# Patient Record
Sex: FEMALE | Race: WHITE | NOT HISPANIC OR LATINO | Employment: FULL TIME | URBAN - NONMETROPOLITAN AREA
[De-identification: names, ages, dates, MRNs, and addresses within clinical notes are randomized per-mention and may not be internally consistent; named-entity substitution may affect disease eponyms.]

---

## 2017-12-15 ENCOUNTER — COMMUNICATION - GICH (OUTPATIENT)
Dept: FAMILY MEDICINE | Facility: OTHER | Age: 52
End: 2017-12-15

## 2017-12-15 DIAGNOSIS — G43.009 MIGRAINE WITHOUT AURA AND WITHOUT STATUS MIGRAINOSUS, NOT INTRACTABLE: ICD-10-CM

## 2018-01-24 ENCOUNTER — HISTORY (OUTPATIENT)
Dept: FAMILY MEDICINE | Facility: OTHER | Age: 53
End: 2018-01-24

## 2018-01-24 ENCOUNTER — OFFICE VISIT - GICH (OUTPATIENT)
Dept: FAMILY MEDICINE | Facility: OTHER | Age: 53
End: 2018-01-24

## 2018-01-24 DIAGNOSIS — Z00.00 ENCOUNTER FOR GENERAL ADULT MEDICAL EXAMINATION WITHOUT ABNORMAL FINDINGS: ICD-10-CM

## 2018-01-24 DIAGNOSIS — L91.8 OTHER HYPERTROPHIC DISORDERS OF THE SKIN: ICD-10-CM

## 2018-01-24 DIAGNOSIS — K21.00 GASTRO-ESOPHAGEAL REFLUX DISEASE WITH ESOPHAGITIS: ICD-10-CM

## 2018-01-24 DIAGNOSIS — R59.0 LOCALIZED ENLARGED LYMPH NODES: ICD-10-CM

## 2018-01-24 DIAGNOSIS — E78.9 DISORDER OF LIPOPROTEIN METABOLISM: ICD-10-CM

## 2018-01-24 DIAGNOSIS — L01.00 IMPETIGO: ICD-10-CM

## 2018-01-24 DIAGNOSIS — E04.9 NONTOXIC GOITER: ICD-10-CM

## 2018-01-24 DIAGNOSIS — Z23 ENCOUNTER FOR IMMUNIZATION: ICD-10-CM

## 2018-01-24 DIAGNOSIS — N84.1 POLYP OF CERVIX UTERI: ICD-10-CM

## 2018-01-24 LAB
A/G RATIO - HISTORICAL: 2 (ref 1–2)
ABSOLUTE BASOPHILS - HISTORICAL: 0 THOU/CU MM
ABSOLUTE EOSINOPHILS - HISTORICAL: 0.1 THOU/CU MM
ABSOLUTE IMMATURE GRANULOCYTES(METAS,MYELOS,PROS) - HISTORICAL: 0 THOU/CU MM
ABSOLUTE LYMPHOCYTES - HISTORICAL: 2.1 THOU/CU MM (ref 0.9–2.9)
ABSOLUTE MONOCYTES - HISTORICAL: 0.5 THOU/CU MM
ABSOLUTE NEUTROPHILS - HISTORICAL: 4.7 THOU/CU MM (ref 1.7–7)
ALBUMIN SERPL-MCNC: 4.5 G/DL (ref 3.5–5.7)
ALP SERPL-CCNC: 79 IU/L (ref 34–104)
ALT (SGPT) - HISTORICAL: 35 IU/L (ref 7–52)
ANION GAP - HISTORICAL: 9 (ref 5–18)
AST SERPL-CCNC: 24 IU/L (ref 13–39)
BASOPHILS # BLD AUTO: 0.3 %
BILIRUB SERPL-MCNC: 0.6 MG/DL (ref 0.3–1)
BUN SERPL-MCNC: 13 MG/DL (ref 7–25)
BUN/CREAT RATIO - HISTORICAL: 17
CALCIUM SERPL-MCNC: 9.2 MG/DL (ref 8.6–10.3)
CHLORIDE SERPLBLD-SCNC: 105 MMOL/L (ref 98–107)
CHOL/HDL RATIO - HISTORICAL: 3.38
CHOLESTEROL TOTAL: 186 MG/DL
CO2 SERPL-SCNC: 25 MMOL/L (ref 21–31)
CREAT SERPL-MCNC: 0.76 MG/DL (ref 0.7–1.3)
EOSINOPHIL NFR BLD AUTO: 0.7 %
ERYTHROCYTE [DISTWIDTH] IN BLOOD BY AUTOMATED COUNT: 14.6 % (ref 11.5–15.5)
GFR IF NOT AFRICAN AMERICAN - HISTORICAL: >60 ML/MIN/1.73M2
GLOBULIN - HISTORICAL: 2.3 G/DL (ref 2–3.7)
GLUCOSE SERPL-MCNC: 93 MG/DL (ref 70–105)
HCT VFR BLD AUTO: 37.8 % (ref 33–51)
HDLC SERPL-MCNC: 55 MG/DL (ref 23–92)
HEMOGLOBIN: 11.9 G/DL (ref 12–16)
IMMATURE GRANULOCYTES(METAS,MYELOS,PROS) - HISTORICAL: 0.1 %
LDLC SERPL CALC-MCNC: 116 MG/DL
LYMPHOCYTES NFR BLD AUTO: 28.4 % (ref 20–44)
MCH RBC QN AUTO: 26.1 PG (ref 26–34)
MCHC RBC AUTO-ENTMCNC: 31.5 G/DL (ref 32–36)
MCV RBC AUTO: 83 FL (ref 80–100)
MONOCYTES NFR BLD AUTO: 7.1 %
NEUTROPHILS NFR BLD AUTO: 63.4 % (ref 42–72)
NON-HDL CHOLESTEROL - HISTORICAL: 131 MG/DL
PLATELET # BLD AUTO: 227 THOU/CU MM (ref 140–440)
PMV BLD: 11 FL (ref 6.5–11)
POTASSIUM SERPL-SCNC: 3.9 MMOL/L (ref 3.5–5.1)
PROT SERPL-MCNC: 6.8 G/DL (ref 6.4–8.9)
PROVIDER ORDERDED STATUS - HISTORICAL: ABNORMAL
RED BLOOD COUNT - HISTORICAL: 4.56 MIL/CU MM (ref 4–5.2)
SODIUM SERPL-SCNC: 139 MMOL/L (ref 133–143)
TRIGL SERPL-MCNC: 75 MG/DL
TSH - HISTORICAL: 2.76 UIU/ML (ref 0.34–5.6)
VIT B12 SERPL-MCNC: 444 PG/ML (ref 180–914)
WHITE BLOOD COUNT - HISTORICAL: 7.4 THOU/CU MM (ref 4.5–11)

## 2018-02-01 ENCOUNTER — HISTORY (OUTPATIENT)
Dept: RADIOLOGY | Facility: OTHER | Age: 53
End: 2018-02-01

## 2018-02-01 ENCOUNTER — HOSPITAL ENCOUNTER (OUTPATIENT)
Dept: RADIOLOGY | Facility: OTHER | Age: 53
End: 2018-02-01
Attending: FAMILY MEDICINE

## 2018-02-01 ENCOUNTER — AMBULATORY - GICH (OUTPATIENT)
Dept: FAMILY MEDICINE | Facility: OTHER | Age: 53
End: 2018-02-01

## 2018-02-01 DIAGNOSIS — N84.1 POLYP OF CERVIX UTERI: ICD-10-CM

## 2018-02-01 DIAGNOSIS — Z00.00 ENCOUNTER FOR GENERAL ADULT MEDICAL EXAMINATION WITHOUT ABNORMAL FINDINGS: ICD-10-CM

## 2018-02-01 LAB — HPV RESULTS - HISTORICAL: NEGATIVE

## 2018-02-05 ENCOUNTER — DOCUMENTATION ONLY (OUTPATIENT)
Dept: FAMILY MEDICINE | Facility: OTHER | Age: 53
End: 2018-02-05

## 2018-02-05 ENCOUNTER — AMBULATORY - GICH (OUTPATIENT)
Dept: RADIOLOGY | Facility: OTHER | Age: 53
End: 2018-02-05

## 2018-02-05 DIAGNOSIS — R92.8 OTHER ABNORMAL AND INCONCLUSIVE FINDINGS ON DIAGNOSTIC IMAGING OF BREAST: ICD-10-CM

## 2018-02-05 PROBLEM — B97.7 HUMAN PAPILLOMAVIRUS IN CONDITIONS CLASSIFIED ELSEWHERE AND OF UNSPECIFIED SITE: Status: ACTIVE | Noted: 2018-02-05

## 2018-02-05 RX ORDER — CYCLOBENZAPRINE HCL 10 MG
1 TABLET ORAL
COMMUNITY
Start: 2017-12-18 | End: 2018-12-05

## 2018-02-05 RX ORDER — HYDROCHLOROTHIAZIDE 12.5 MG/1
0.5 TABLET ORAL DAILY PRN
COMMUNITY
Start: 2016-10-21 | End: 2018-02-20

## 2018-02-06 ENCOUNTER — COMMUNICATION - GICH (OUTPATIENT)
Dept: FAMILY MEDICINE | Facility: OTHER | Age: 53
End: 2018-02-06

## 2018-02-07 ENCOUNTER — COMMUNICATION - GICH (OUTPATIENT)
Dept: FAMILY MEDICINE | Facility: OTHER | Age: 53
End: 2018-02-07

## 2018-02-07 ENCOUNTER — HOSPITAL ENCOUNTER (OUTPATIENT)
Dept: RADIOLOGY | Facility: OTHER | Age: 53
End: 2018-02-07
Attending: FAMILY MEDICINE

## 2018-02-07 DIAGNOSIS — N94.9 UNSPECIFIED CONDITION ASSOCIATED WITH FEMALE GENITAL ORGANS AND MENSTRUAL CYCLE (CODE): ICD-10-CM

## 2018-02-07 DIAGNOSIS — R92.8 OTHER ABNORMAL AND INCONCLUSIVE FINDINGS ON DIAGNOSTIC IMAGING OF BREAST: ICD-10-CM

## 2018-02-08 ENCOUNTER — COMMUNICATION - GICH (OUTPATIENT)
Dept: FAMILY MEDICINE | Facility: OTHER | Age: 53
End: 2018-02-08

## 2018-02-08 DIAGNOSIS — L01.00 IMPETIGO: ICD-10-CM

## 2018-02-09 VITALS
HEIGHT: 67 IN | WEIGHT: 157.8 LBS | HEART RATE: 62 BPM | SYSTOLIC BLOOD PRESSURE: 130 MMHG | DIASTOLIC BLOOD PRESSURE: 82 MMHG | BODY MASS INDEX: 24.77 KG/M2

## 2018-02-11 ENCOUNTER — HEALTH MAINTENANCE LETTER (OUTPATIENT)
Age: 53
End: 2018-02-11

## 2018-02-12 NOTE — TELEPHONE ENCOUNTER
Patient Information     Patient Name MRN Sex Rakel Walker [id number] Female 1965      Telephone Encounter by Cassandra Oden RN at 2017 10:38 AM     Author:  Cassandra Oden RN Service:  (none) Author Type:  NURS- Registered Nurse     Filed:  2017 10:45 AM Encounter Date:  12/15/2017 Status:  Signed     :  Cassandra Oden RN (NURS- Registered Nurse)            PLEASE REVIEW, SIGN AND SEND AS APPROPRIATE: THANK YOU.    Patient was due for annual physical on 10/21/2017. No upcoming appointments noted. Patient plans to make schedule appointment today for some time in January. Patient will need to call back as she is in the middle of something pressing at work.     PATIENT WOULD LIKE A CALL ONCE THIS MEDICATION HAS BEEN APPROVED. Patient is aware that PCP is out of clinic until Tue. 17.    This is a Refill request from: [facility]  Name of Medication: cyclobenzaprine (FLEXERIL)  Quantity requested: 31 tabs  Last fill date: 2016  Due for refill: Yes  Last visit with RACHEL CARDOZO was on: 10/21/2016 in [facility]  PCP:  Rachel Cardozo MD  Controlled Substance Agreement: None for this medication.  Diagnosis r/t this medication request: Non-intractable migraine    Unable to complete prescription refill per RN Medication Refill Policy.................... Cassandra Oden RN ....................  2017   10:39 AM

## 2018-02-12 NOTE — TELEPHONE ENCOUNTER
Patient Information     Patient Name MRN Sex Rakel Walker [id number] Female 1965      Telephone Encounter by Keyla Walters RN at 2017 10:29 AM     Author:  Keyla Walters RN Service:  (none) Author Type:  NURS- Registered Nurse     Filed:  2017 10:44 AM Encounter Date:  12/15/2017 Status:  Signed     :  Keyla Walters RN (NURS- Registered Nurse)            ,  Pt is requesting a refill of Cyclobenzaprine last filled 2016.  Pt was last seen 2016, medication was reviewed.  Appointment reminder letter will be sent today.  Medication is dasha'd up per request, please review and sign if appropriate.    This is a Refill request from: [facility]  Name of Medication: Cyclobenzaprine  Quantity requested: 31  Last fill date: 2016  Due for refill: yes  Last visit with ROSA ISELA CARDOZO was on: 10/21/2016 in [facility]  PCP:  Rosa Isela Cardozo MD  Controlled Substance Agreement:  NA   Diagnosis r/t this medication request: Migraine     Unable to complete prescription refill per RN Medication Refill Policy.................... Keyla Walters RN ....................  2017   10:30 AM

## 2018-02-13 NOTE — PROGRESS NOTES
Patient Information     Patient Name MRN Sex     Rakel Mckay [id number] Female 1965      Progress Notes by Rosa Isela Cardozo MD at 2018 10:30 AM     Author:  Rosa Isela Cardozo MD Service:  (none) Author Type:  Physician     Filed:  2018  5:36 PM Encounter Date:  2018 Status:  Signed     :  Rosa Isela Cardozo MD (Physician)            Nursing Notes:   Urmila Harris  2018 12:51 PM  Signed  Rakel Mckay is a 52 y.o. Female here for annual visit.  No concerns.  Darlyn Harris LPN ...... 2018 10:39 AM      Subjective:  Rakel Mckay is a 52 y.o. female who presents for  Routine physical         GYN   patient is a  001 LMP is 2018. She is in a monogamous relationship. Remote history of HPV. Would like to have Pap smear today. She has no history of vaginal discomfort or discharge. Declines STD screening. States occasionally she has small amount of spotting.  Periods are starting to  become more irregular.  She has had a history of significant fibrocystic breast disease she's had numerous cysts drained.        Reflux esophagitis   patient notes that she's had increased indigestion symptoms. At times she's had trouble swallowing and feels a fullness on the side of her neck. Denies any fevers or chills. Weight is stable. No significant  night sweats     Skin tag  Patient has skin tags under her breasts that are growing and would like to have these treated.       . Borderline high cholesterol   patient has had a history of borderline cholesterol with like to be checked for this she is fasting today. She denies any chest pain or palpitations.     HCM   needs flu vaccine             Impetigo     Patient reports rash around perioral area last few days      No Known Allergies  Current Outpatient Prescriptions on File Prior to Visit       Medication  Sig Dispense Refill     cyclobenzaprine (FLEXERIL) 10 mg tablet TAKE 1 TABLET BY MOUTH ONCE DAILY  "AT BEDTIME AS NEEDED FOR MUSCLE SPASMS. 31 tablet 1     No current facility-administered medications on file prior to visit.        Problem List/PMH: reviewed in EMR    Social Hx:  Social History     Substance Use Topics       Smoking status: Never Smoker     Smokeless tobacco: Never Used     Alcohol use No     Social History Narrative    , employed at [employer].      Drew Spouse, employed at [employer]    Daughter, born 02/01        Preloaded 12/26/13        Family Hx:   Family History       Problem   Relation Age of Onset     Heart Disease  Mother      MI at age 40 with subsequent CHF, lung cancer, and chronic obstructive pulmonary disease, doing well, TIAs       Cancer  Mother       lung cancer       Other  Mother      COPD        Stroke  Mother      TIAs       Other  Father      hypothyroid       Cancer-prostate  Paternal Grandfather      Cancer-breast  No Family History        Objective:  /82 (Cuff Site: Right Arm, Position: Sitting, Cuff Size: Adult Large)  Pulse 62  Ht 1.689 m (5' 6.5\")  Wt 71.6 kg (157 lb 12.8 oz)  LMP 01/05/2018  BMI 25.09 kg/m2    General Appearance: Normal., Pleasant, alert, appropriate appearance for age. No acute distress,  Psych-alert, oriented, and appropriate affect  HEENT-within normal limits   Neck Exam: Normal., Supple, no masses or nodes.  Thyroid Exam: No nodules or enlargement., normal to palpation  Lungs:  Clear to auscultation.  Cardiovascular Exam: Regular rate and rhythm. S1, S2, no murmur, click, gallop, or rubs.  Breast Exam: Normal., No dimpling, nipple retraction or discharge. Fibrocystic changes but no masses or nodes.  Gastrointestinal Exam: Soft, nontender, no abnormal masses or organomegaly.    Genitourinary Exam Female:   External Genitalia: normal hair distribution, EG/BUS  Vaginal exam: normal pink mucosa without prolapse or lesions  Cervix: normal appearance without lesions or other abnormalities  She has small cervical polyp 5- 6 o'clock " position;  Removed with ring forceps after pap was obtained.     BME: normal size uterus, no adnexal masses.  Skin: no concerning moles, she has 4 skin tags approximately half centimeter or 3 mm. They're in the bra line. These were treated with cryotherapy after spent 15 alternatives were discussed. She tolerated this well. Additionally she has a impetigo type rash in her perioral area  Extremities:  NT, no edema      Results for orders placed or performed in visit on 01/24/18      COMP METABOLIC PANEL      Result  Value Ref Range    SODIUM 139 133 - 143 mmol/L    POTASSIUM 3.9 3.5 - 5.1 mmol/L    CHLORIDE 105 98 - 107 mmol/L    CO2,TOTAL 25 21 - 31 mmol/L    ANION GAP 9 5 - 18                    GLUCOSE 93 70 - 105 mg/dL    CALCIUM 9.2 8.6 - 10.3 mg/dL    BUN 13 7 - 25 mg/dL    CREATININE 0.76 0.70 - 1.30 mg/dL    BUN/CREAT RATIO           17                    GFR if African American >60 >60 ml/min/1.73m2    GFR if not African American >60 >60 ml/min/1.73m2    ALBUMIN 4.5 3.5 - 5.7 g/dL    PROTEIN,TOTAL 6.8 6.4 - 8.9 g/dL    GLOBULIN                  2.3 2.0 - 3.7 g/dL    A/G RATIO 2.0 1.0 - 2.0                    BILIRUBIN,TOTAL 0.6 0.3 - 1.0 mg/dL    ALK PHOSPHATASE 79 34 - 104 IU/L    ALT (SGPT) 35 7 - 52 IU/L    AST (SGOT) 24 13 - 39 IU/L   TSH      Result  Value Ref Range    TSH 2.76 0.34 - 5.60 uIU/mL   LIPID PANEL      Result  Value Ref Range    CHOLESTEROL,TOTAL 186 <200 mg/dL    TRIGLYCERIDES 75 <150 mg/dL    HDL CHOLESTEROL 55 23 - 92 mg/dL    NON-HDL CHOLESTEROL 131 <145 mg/dl    CHOL/HDL RATIO            3.38 <4.50                    LDL CHOLESTEROL 116 (H) <100 mg/dL    PROVIDER ORDERED STATUS FASTING    VITAMIN B12      Result  Value Ref Range    VITAMIN B12 444 180 - 914 pg/mL   CBC WITH AUTO DIFFERENTIAL      Result  Value Ref Range    WHITE BLOOD COUNT         7.4 4.5 - 11.0 thou/cu mm    RED BLOOD COUNT           4.56 4.00 - 5.20 mil/cu mm    HEMOGLOBIN                11.9 (L) 12.0 - 16.0 g/dL     HEMATOCRIT                37.8 33.0 - 51.0 %    MCV                       83 80 - 100 fL    MCH                       26.1 26.0 - 34.0 pg    MCHC                      31.5 (L) 32.0 - 36.0 g/dL    RDW                       14.6 11.5 - 15.5 %    PLATELET COUNT            227 140 - 440 thou/cu mm    MPV                       11.0 6.5 - 11.0 fL    NEUTROPHILS               63.4 42.0 - 72.0 %    LYMPHOCYTES               28.4 20.0 - 44.0 %    MONOCYTES                 7.1 <12.0 %    EOSINOPHILS               0.7 <8.0 %    BASOPHILS                 0.3 <3.0 %    IMMATURE GRANULOCYTES(METAS,MYELOS,PROS) 0.1 %    ABSOLUTE NEUTROPHILS      4.7 1.7 - 7.0 thou/cu mm    ABSOLUTE LYMPHOCYTES      2.1 0.9 - 2.9 thou/cu mm    ABSOLUTE MONOCYTES        0.5 <0.9 thou/cu mm    ABSOLUTE EOSINOPHILS      0.1 <0.5 thou/cu mm    ABSOLUTE BASOPHILS        0.0 <0.3 thou/cu mm    ABSOLUTE IMMATURE GRANULOCYTES(METAS,MYELOS,PROS) 0.0 <=0.3 thou/cu mm         Assessment:    ICD-10-CM    1. Routine physical examination Z00.00 GYN THIN PREP PAP SCREEN IMAGED      XR MAMMO BILAT SCREENING      GYN THIN PREP PAP SCREEN IMAGED   2. Cervical adenopathy R59.0 CBC WITH DIFFERENTIAL      CBC WITH DIFFERENTIAL      CBC WITH AUTO DIFFERENTIAL   3. Reflux esophagitis K21.0 COMP METABOLIC PANEL      COMP METABOLIC PANEL   4. Enlarged thyroid E04.9 TSH      TSH   5. Borderline high cholesterol E78.9 LIPID PANEL      VITAMIN B12      LIPID PANEL      VITAMIN B12   6. Encounter for immunization Z23 FLU VACCINE => 3 YRS PF QUADRIVALENT IIV4 IM      SD ADMIN VACC INITIAL   7. Cervical polyp N84.1 US PELVIS COMPLETE TA AND TV      pathology specimen      pathology specimen   8. Skin tag L91.8 SD DESTROY PREMALIGNANT LESION EA 2-14   9. Impetigo L01.00 mupirocin 2% topical (BACTROBAN OINTMENT) ointment     discussion with patient pathology results for cervical polyp will take a few days this will be sent to her in the mail. Flu vaccine updated. Did discuss need  for ultrasound given cervical polyp  Will treat impetigo as noted per Epic. If she has persistent problems with neck fullness consideration for ultrasound.    Work on following  a mediterranean diet; Use monosaturated fats ( olive , canola and peanut   oil; nuts, avocados), abundance of plant foods which are minimally processed,   fish (salmon).       Plan:   -- Expected clinical course discussed   -- Medications and their side effects discussed  Patient Instructions   1. Pelvic ultrasound to evaluate endometrial lining given cervical polyp  2. For all to gynecologist to review ultrasound possible need for endometrial biopsy   3. Mammogram ordered   4. Skin tag areas that were frozen well bubble up and slough off   5.   Bactroban twice a day to 3 times a day for impetigo   6.  Eat nutritionally;  Add vitamin  c           Index   Cervical Polyps   ________________________________________________________________________  KEY POINTS    Cervical polyps are a growth of tissue on the cervix, the lower part of the uterus that opens into the vagina.    Your healthcare provider will painlessly remove most polyps in the office and send the tissue to a lab for tests to make sure it is not cancer.    Get a pelvic exam as often as recommended by your healthcare provider.  ________________________________________________________________________  What are cervical polyps?  Cervical polyps are a growth of tissue on the cervix. The cervix is the lower part of the uterus that opens into the vagina. The uterus is the muscular organ at the top of the vagina. Babies grow in the uterus, and menstrual blood comes from the uterus, through the cervix.   Women of any age may have cervical polyps.  What is the cause?  The exact cause of cervical polyps is not known. They are not related to sexually transmitted diseases and infections (STDs and STIs) and are very rarely related to cancer.  What are the symptoms?  Cervical polyps often do not  cause any symptoms. You usually cannot feel or see them. Sometimes they may cause bleeding between menstrual periods, especially after sex.  How are they diagnosed?  Your healthcare provider will ask about your symptoms and medical history and examine you. You will have a pelvic exam and possibly a Pap test. Most cervical polyps are discovered during a pelvic exam or with exams for causes of unusual bleeding.  How is it treated?  Polyps seldom develop into cancer of the cervix. However, the only way your healthcare provider can be sure that a polyp is not cancer is by removing it and looking at the tissue in the lab.  Most polyps are removed. Your healthcare provider usually will be able to remove the polyps painlessly. Your provider may scrape the base of the polyp to make sure the entire polyp is removed. A type of paste may be put on your cervix if there is any bleeding. The tissue that was removed will be sent to a lab for tests to make sure it is not cancer. This procedure can usually be done in your provider's office.  If the polyp is not removed:    It may keep growing.    It may cause bleeding between menstrual periods or after sex.    It may be harder for you to get pregnant.  How can I take care of myself?  After treatment for cervical polyps, get a pelvic exam as often as recommended by your healthcare provider. Tell your healthcare provider if you have bleeding after sex, between menstrual periods, or after menopause.  Developed by [employer].  Adult Advisor 2017.2 published by [employer].  Last modified: 2015-10-20  Last reviewed: 2015-10-20  This content is reviewed periodically and is subject to change as new health information becomes available. The information is intended to inform and educate and is not a replacement for medical evaluation, advice, diagnosis or treatment by a healthcare professional.  References   Adult Advisor 2017.2 Index    Copyright   2017 [employer], a division of [employer] All rights reserved.          Index   Impetigo   ________________________________________________________________________  KEY POINTS    Impetigo is a skin infection caused by Staphylococcus or Streptococcus bacteria. The infection begins as one or more blisters. The blisters form pus inside and then break open.    If the infection is mild, all you may need to do is keep your skin clean so the infection can heal on its own. Your healthcare provider may prescribe an antibiotic for larger or more serious infections.    Ask your provider how to take care of yourself at home, if there are activities you should avoid, and when you can return to your normal activities.  ________________________________________________________________________  What is impetigo?  Impetigo is a skin infection caused by bacteria. It is more common in children than in adults. Impetigo is usually a mild infection but it can spread and cause serious illness if it is not treated.  What is the cause?  Impetigo is caused by Staphylococcus or Streptococcus bacteria. These bacteria usually live on your skin without hurting you. However, if the bacteria get into the deeper layers of the skin, they may cause an infection. This can happen if you have a skin condition like eczema, or if you have a scratch, scrape, insect bite, or other irritation that causes a break in the skin. Impetigo is more common when it is hot and humid.   Scratching the infected area and then touching other parts of your body can spread the infection. Impetigo is very contagious. Physical contact can spread the infection to other people. It can also be spread by contaminated clothing, sports equipment, towels, bedding, and toys.  What are the symptoms?  Impetigo can infect any part of the body, but often appears on the face between the upper lip and nose. The infection begins as one or more blisters. The blisters form pus inside and then break open. The pus from  the blisters usually dries as a gold or yellow-colored crust. The blisters or sores are painless, but may itch.  How is it diagnosed?  Your healthcare provider will ask about your symptoms and medical history and examine your skin. Your provider may use a swab to get a sample of fluid from one of the sores. The sample is sent to the lab and tested. If you have an infection, it may take several days to find out what kind of germ is causing it. Knowing what germ is causing the infection helps your provider choose the right medicine to treat it.  How is it treated?  The treatment depends on your age and the severity and type of infection that you have. If the infection is mild, all you may need to do is keep your skin clean so the infection can heal on its own. Your healthcare provider may recommend a nonprescription antibiotic ointment or prescribe one for you to put on your skin.  Some people carry the bacteria inside their nose and the infection may come back if the nose is not treated. Your provider will tell you if you need to put some of the antibiotic ointment inside your nose and for how long.  For larger or more serious infections, your provider may prescribe an antibiotic to take by mouth or give you a shot of antibiotic medicine.  The sores should start to heal within 2 to 5 days after you start using an antibiotic. If you are taking antibiotic medicine, the infection usually stops being contagious after 24 hours of treatment. If you are using an antibiotic ointment instead, the sores will no longer be contagious when they stop oozing and are drying up.  How can I take care of myself?  Follow the full course of treatment prescribed by your healthcare provider. If your provider prescribed an antibiotic to take by mouth, take the medicine for as long as your healthcare provider prescribes, even if you feel better. If you stop taking the medicine too soon, you may not kill all of the bacteria and the infection  may come back.  In addition, you can:    Gently wash the infected area with antibacterial soap.    To remove the crusts, first soak the area for 15 to 20 minutes in warm soapy water. Then gently remove the crusts with a warm washcloth.    Don t share hand towels with others while you have this infection.    Wash any washcloths or towels you use in hot water separately from other laundry.    Cover sores on the face with a bandage if necessary to prevent scratching, especially when sleeping. Cover any sores that are draining with a bandage to protect clothing and prevent spread. Wash your hands after touching the area.    Shave around sores, not over them.    Avoid touching the sores as much as possible.  Ask your provider:    How and when you will get your test results    How long it will take to recover    If there are activities you should avoid and when you can return to your normal activities    How to take care of yourself at home    What symptoms or problems you should watch for and what to do if you have them  Make sure you know when you should come back for a checkup.  How can I help prevent impetigo?    Wash cuts and scratches, insect bites, or other breaks in the skin with warm water and soap right away to prevent infection. Cover the area with a bandage.    Wash your hands often and especially after using the restroom, coughing, sneezing, or blowing your nose. Also wash your hands before eating or touching your eyes.    Do not share washcloths, towels, clothing, sleeping bags, bedding, bath water, or personal items like razors or ash.    Avoid sharing sports equipment, such as football pads, as much as possible.    Use hot, soapy water to wash clothes and bedding. Dry clothes on the hot setting if you use a dryer.    Shower or bathe using soap every day and after you exercise.  In sports such as wrestling, where there may be close contact, athletes who have impetigo should not compete while they have the  infection. It s also important to keep mats and equipment clean.  Developed by [employer].  Adult Advisor 2017.2 published by [employer].  Last modified: 2016-07-28  Last reviewed: 2016-07-28  This content is reviewed periodically and is subject to change as new health information becomes available. The information is intended to inform and educate and is not a replacement for medical evaluation, advice, diagnosis or treatment by a healthcare professional.  References   Adult Advisor 2017.2 Index    Copyright   2017 [employer], a division of [employer] All rights reserved.           Electronically signed by Rosa Isela Cardozo MD

## 2018-02-13 NOTE — TELEPHONE ENCOUNTER
Patient Information     Patient Name MRN Sex     Rakel Mckay [id number] Female 1965      Telephone Encounter by Rosa Isela Cardozo MD at 2018  1:42 PM     Author:  Rosa Isela Cardozo MD Service:  (none) Author Type:  Physician     Filed:  2018  1:44 PM Encounter Date:  2018 Status:  Signed     :  Rosa Isela Cardozo MD (Physician)            Details             Reading Physician Reading Date Result Priority         Ihsan Diaz MD 2018                    Narrative               PROCEDURE: US PELVIS COMPLETE TV    HISTORY: Cervical polyp. LMP 2018    TECHNIQUE: Transabdominal and transvaginal ultrasound of the pelvis.    COMPARISON: 2013    FINDINGS:     MEASUREMENTS:  Uterus: 7.6 x 5.7 x 8.4 (Length x Height x Width)  Endometrium: 8 mm in thickness    UTERUS: The uterus is normal in size and contour. The endometrium is normal in thickness, with mildly heterogeneous margins. There is no myometrial mass. Nabothian cysts are noted.    ADNEXA: Neither ovary is definitively identified. A small cyst is questioned in the deep left adnexa measuring less than 2 cm, potentially within the left ovary and similar in appearance to the prior from .    MISC: There is no free fluid in the pelvis.    IMPRESSION: Nabothian cysts.    Electronically Signed By: Chevy Diaz on 2018 3:21 PM          She needs to follow up with GYN to have them review a cyst in the left adnexal area;  DR. BIRMINGHAM ;   Referral placed.

## 2018-02-13 NOTE — PROGRESS NOTES
Patient Information     Patient Name MRN Sex Rakel Walker [id number] Female 1965      Progress Notes by Alissa Pierce at 2018  2:40 PM     Author:  Alissa Pierce Service:  (none) Author Type:  (none)     Filed:  2018  2:40 PM Date of Service:  2018  2:40 PM Status:  Signed     :  Alissa Pierce            Falls Risk Criteria:    Age 65 and older or under age 4        Sensory deficits    Poor vision    Use of ambulatory aides    Impaired judgment    Unable to walk independently    Meets High Risk criteria for falls:  no

## 2018-02-13 NOTE — TELEPHONE ENCOUNTER
Patient Information     Patient Name MRN Sex Rakel Walker [id number] Female 1965      Telephone Encounter by Rosa Isela Cardozo MD at 2018  8:18 PM     Author:  Rosa Isela Cardozo MD Service:  (none) Author Type:  Physician     Filed:  2018  8:19 PM Encounter Date:  2018 Status:  Signed     :  Rosa Isela Cardozo MD (Physician)            Peter called    MD attempt to call patient Thursday evening no answer.

## 2018-02-13 NOTE — NURSING NOTE
Patient Information     Patient Name MRN Sex Rakel Walker [id number] Female 1965      Nursing Note by Urmila Harris at 2018 10:30 AM     Author:  Urmila Harris Service:  (none) Author Type:  (none)     Filed:  2018 12:51 PM Encounter Date:  2018 Status:  Signed     :  Urmila Harris            Rakel Mckay is a 52 y.o. Female here for annual visit.  No concerns.  Darlyn Harris LPN ...... 2018 10:39 AM

## 2018-02-13 NOTE — TELEPHONE ENCOUNTER
Patient Information     Patient Name MRN Rakel Jimenez [id number] Female 1965      Telephone Encounter by Jayla Delaney at 2018  8:40 AM     Author:  Jayla Delaney Service:  (none) Author Type:  (none)     Filed:  2018  8:40 AM Encounter Date:  2018 Status:  Signed     :  Jayla Delaney            Contacted patient and she was notified that Rx has been sent to her pharmacy.  Jayla Delaney LPN  2018  8:40 AM

## 2018-02-13 NOTE — TELEPHONE ENCOUNTER
Patient Information     Patient Name MRN Sex Rakel Walker [id number] Female 1965      Telephone Encounter by Urmila Harris at 2018 10:10 AM     Author:  Urmila Harris Service:  (none) Author Type:  (none)     Filed:  2018 10:14 AM Encounter Date:  2018 Status:  Signed     :  Urmila Harris            Patient continues to have symptoms of impetigo that haven't resolved after 2 weeks, states is a lot better, but continues to have swelling, tingling on lips.  Was told by a pharmacist to call and f/u as the Bactroban hasn't cleared this fully and may need something else. Is their something else she could try? OK to wait until return tomorrow.  Darlyn Harris LPN ...... 2018 10:13 AM

## 2018-02-13 NOTE — ADDENDUM NOTE
Patient Information     Patient Name MRN Rakel Jimenez [id number] Female 1965      Addendum Note by Stacia Espana at 2018 12:11 PM     Author:  Stacia Espana Service:  (none) Author Type:  (none)     Filed:  2018 12:11 PM Encounter Date:  2018 Status:  Signed     :  Stacia Espana       Addended by: STACIA ESPANA on: 2018 12:11 PM        Modules accepted: Orders

## 2018-02-13 NOTE — PROGRESS NOTES
Patient Information     Patient Name MRN Sex Rakel Walker [id number] Female 1965      Progress Notes by Sharee Silvestre R.T. ([facility]) at 2018  2:17 PM     Author:  Sharee Silvestre R.T. (ARRT) Service:  (none) Author Type:  [employer] - Registered Radiologic Technologist     Filed:  2018  2:18 PM Date of Service:  2018  2:17 PM Status:  Signed     :  Sharee Silvestre R.T. (ARRT) ([facility] - Registered Radiologic Technologist)            Falls Risk Criteria:    Age 65 and older or under age 4        Sensory deficits    Poor vision    Use of ambulatory aides    Impaired judgment    Unable to walk independently    Meets High Risk criteria for falls:  no

## 2018-02-13 NOTE — PATIENT INSTRUCTIONS
Patient Information     Patient Name MRN Rakel Jimenez [id number] Female 1965      Patient Instructions by Rosa Isela Cardozo MD at 2018 12:09 PM     Author:  Rosa Isela Cardozo MD  Service:  (none) Author Type:  Physician     Filed:  2018  5:33 PM  Encounter Date:  2018 Status:  Addendum     :  Rosa Isela Cardozo MD (Physician)        Related Notes: Original Note by Rosa Isela Cardozo MD (Physician) filed at 2018 12:09 PM            1. Pelvic ultrasound to evaluate endometrial lining given cervical polyp  2. For all to gynecologist to review ultrasound possible need for endometrial biopsy   3. Mammogram ordered   4. Skin tag areas that were frozen well bubble up and slough off   5.   Bactroban twice a day to 3 times a day for impetigo   6.  Eat nutritionally;  Add vitamin  c           Index   Cervical Polyps   ________________________________________________________________________  KEY POINTS    Cervical polyps are a growth of tissue on the cervix, the lower part of the uterus that opens into the vagina.    Your healthcare provider will painlessly remove most polyps in the office and send the tissue to a lab for tests to make sure it is not cancer.    Get a pelvic exam as often as recommended by your healthcare provider.  ________________________________________________________________________  What are cervical polyps?  Cervical polyps are a growth of tissue on the cervix. The cervix is the lower part of the uterus that opens into the vagina. The uterus is the muscular organ at the top of the vagina. Babies grow in the uterus, and menstrual blood comes from the uterus, through the cervix.   Women of any age may have cervical polyps.  What is the cause?  The exact cause of cervical polyps is not known. They are not related to sexually transmitted diseases and infections (STDs and STIs) and are very rarely related to cancer.  What are the  symptoms?  Cervical polyps often do not cause any symptoms. You usually cannot feel or see them. Sometimes they may cause bleeding between menstrual periods, especially after sex.  How are they diagnosed?  Your healthcare provider will ask about your symptoms and medical history and examine you. You will have a pelvic exam and possibly a Pap test. Most cervical polyps are discovered during a pelvic exam or with exams for causes of unusual bleeding.  How is it treated?  Polyps seldom develop into cancer of the cervix. However, the only way your healthcare provider can be sure that a polyp is not cancer is by removing it and looking at the tissue in the lab.  Most polyps are removed. Your healthcare provider usually will be able to remove the polyps painlessly. Your provider may scrape the base of the polyp to make sure the entire polyp is removed. A type of paste may be put on your cervix if there is any bleeding. The tissue that was removed will be sent to a lab for tests to make sure it is not cancer. This procedure can usually be done in your provider's office.  If the polyp is not removed:    It may keep growing.    It may cause bleeding between menstrual periods or after sex.    It may be harder for you to get pregnant.  How can I take care of myself?  After treatment for cervical polyps, get a pelvic exam as often as recommended by your healthcare provider. Tell your healthcare provider if you have bleeding after sex, between menstrual periods, or after menopause.  Developed by [employer].  Adult Advisor 2017.2 published by [employer].  Last modified: 2015-10-20  Last reviewed: 2015-10-20  This content is reviewed periodically and is subject to change as new health information becomes available. The information is intended to inform and educate and is not a replacement for medical evaluation, advice, diagnosis or treatment by a healthcare professional.  References   Adult Advisor 2017.2 Index    Copyright    2017 [facility], a division of [employer] All rights reserved.          Index   Impetigo   ________________________________________________________________________  KEY POINTS    Impetigo is a skin infection caused by Staphylococcus or Streptococcus bacteria. The infection begins as one or more blisters. The blisters form pus inside and then break open.    If the infection is mild, all you may need to do is keep your skin clean so the infection can heal on its own. Your healthcare provider may prescribe an antibiotic for larger or more serious infections.    Ask your provider how to take care of yourself at home, if there are activities you should avoid, and when you can return to your normal activities.  ________________________________________________________________________  What is impetigo?  Impetigo is a skin infection caused by bacteria. It is more common in children than in adults. Impetigo is usually a mild infection but it can spread and cause serious illness if it is not treated.  What is the cause?  Impetigo is caused by Staphylococcus or Streptococcus bacteria. These bacteria usually live on your skin without hurting you. However, if the bacteria get into the deeper layers of the skin, they may cause an infection. This can happen if you have a skin condition like eczema, or if you have a scratch, scrape, insect bite, or other irritation that causes a break in the skin. Impetigo is more common when it is hot and humid.   Scratching the infected area and then touching other parts of your body can spread the infection. Impetigo is very contagious. Physical contact can spread the infection to other people. It can also be spread by contaminated clothing, sports equipment, towels, bedding, and toys.  What are the symptoms?  Impetigo can infect any part of the body, but often appears on the face between the upper lip and nose. The infection begins as one or more blisters. The blisters form pus  inside and then break open. The pus from the blisters usually dries as a gold or yellow-colored crust. The blisters or sores are painless, but may itch.  How is it diagnosed?  Your healthcare provider will ask about your symptoms and medical history and examine your skin. Your provider may use a swab to get a sample of fluid from one of the sores. The sample is sent to the lab and tested. If you have an infection, it may take several days to find out what kind of germ is causing it. Knowing what germ is causing the infection helps your provider choose the right medicine to treat it.  How is it treated?  The treatment depends on your age and the severity and type of infection that you have. If the infection is mild, all you may need to do is keep your skin clean so the infection can heal on its own. Your healthcare provider may recommend a nonprescription antibiotic ointment or prescribe one for you to put on your skin.  Some people carry the bacteria inside their nose and the infection may come back if the nose is not treated. Your provider will tell you if you need to put some of the antibiotic ointment inside your nose and for how long.  For larger or more serious infections, your provider may prescribe an antibiotic to take by mouth or give you a shot of antibiotic medicine.  The sores should start to heal within 2 to 5 days after you start using an antibiotic. If you are taking antibiotic medicine, the infection usually stops being contagious after 24 hours of treatment. If you are using an antibiotic ointment instead, the sores will no longer be contagious when they stop oozing and are drying up.  How can I take care of myself?  Follow the full course of treatment prescribed by your healthcare provider. If your provider prescribed an antibiotic to take by mouth, take the medicine for as long as your healthcare provider prescribes, even if you feel better. If you stop taking the medicine too soon, you may not  kill all of the bacteria and the infection may come back.  In addition, you can:    Gently wash the infected area with antibacterial soap.    To remove the crusts, first soak the area for 15 to 20 minutes in warm soapy water. Then gently remove the crusts with a warm washcloth.    Don t share hand towels with others while you have this infection.    Wash any washcloths or towels you use in hot water separately from other laundry.    Cover sores on the face with a bandage if necessary to prevent scratching, especially when sleeping. Cover any sores that are draining with a bandage to protect clothing and prevent spread. Wash your hands after touching the area.    Shave around sores, not over them.    Avoid touching the sores as much as possible.  Ask your provider:    How and when you will get your test results    How long it will take to recover    If there are activities you should avoid and when you can return to your normal activities    How to take care of yourself at home    What symptoms or problems you should watch for and what to do if you have them  Make sure you know when you should come back for a checkup.  How can I help prevent impetigo?    Wash cuts and scratches, insect bites, or other breaks in the skin with warm water and soap right away to prevent infection. Cover the area with a bandage.    Wash your hands often and especially after using the restroom, coughing, sneezing, or blowing your nose. Also wash your hands before eating or touching your eyes.    Do not share washcloths, towels, clothing, sleeping bags, bedding, bath water, or personal items like razors or ash.    Avoid sharing sports equipment, such as football pads, as much as possible.    Use hot, soapy water to wash clothes and bedding. Dry clothes on the hot setting if you use a dryer.    Shower or bathe using soap every day and after you exercise.  In sports such as wrestling, where there may be close contact, athletes who have  impetigo should not compete while they have the infection. It s also important to keep mats and equipment clean.  Developed by [employer].  Adult Advisor 2017.2 published by [employer].  Last modified: 2016-07-28  Last reviewed: 2016-07-28  This content is reviewed periodically and is subject to change as new health information becomes available. The information is intended to inform and educate and is not a replacement for medical evaluation, advice, diagnosis or treatment by a healthcare professional.  References   Adult Advisor 2017.2 Index    Copyright   2017 [employer], a division of [employer] All rights reserved.

## 2018-02-13 NOTE — PROGRESS NOTES
Patient Information     Patient Name MRN Sex Rakel Walker [id number] Female 1965      Progress Notes by [facility]T. [facility] at 2018 12:22 PM     Author:  Ivelisse Christensen R.T. (ARRT) Service:  (none) Author Type:  RadTech - Registered Radiologic Technologist     Filed:  2018 12:23 PM Date of Service:  2018 12:22 PM Status:  Signed     :  Ivelisse Christensen R.T. (ARRT) ([facility] - Registered Radiologic Technologist)            Falls Risk Criteria:    Age 65 and older or under age 4        Sensory deficits    Poor vision    Use of ambulatory aides    Impaired judgment    Unable to walk independently    Meets High Risk criteria for falls:  no

## 2018-02-13 NOTE — TELEPHONE ENCOUNTER
Patient Information     Patient Name MRN Sex Rakel Walker [id number] Female 1965      Telephone Encounter by Urmila Harris at 2018  9:50 AM     Author:  Urmila Harris Service:  (none) Author Type:  (none)     Filed:  2018  9:51 AM Encounter Date:  2018 Status:  Signed     :  Urmila Harris            Left message to call back  ....................Darlyn Harris LPN  2018   9:51 AM

## 2018-02-13 NOTE — TELEPHONE ENCOUNTER
Patient Information     Patient Name MRN Rakel Jimenez [id number] Female 1965      Telephone Encounter by Melita Aiken at 2018  1:56 PM     Author:  Melita Aiken Service:  (none) Author Type:  (none)     Filed:  2018  1:56 PM Encounter Date:  2018 Status:  Signed     :  Melita Aiken            Spoke with patient after confirming last name and date of birth, informed patient of below information.   Melita Aiken LPN............................ 2018 1:56 PM

## 2018-02-14 ENCOUNTER — HOSPITAL ENCOUNTER (OUTPATIENT)
Dept: MAMMOGRAPHY | Facility: OTHER | Age: 53
Discharge: HOME | End: 2018-02-14
Attending: FAMILY MEDICINE | Admitting: FAMILY MEDICINE
Payer: COMMERCIAL

## 2018-02-14 ENCOUNTER — HOSPITAL ENCOUNTER (OUTPATIENT)
Dept: MAMMOGRAPHY | Facility: OTHER | Age: 53
End: 2018-02-14
Attending: FAMILY MEDICINE
Payer: COMMERCIAL

## 2018-02-14 VITALS
HEART RATE: 62 BPM | DIASTOLIC BLOOD PRESSURE: 73 MMHG | SYSTOLIC BLOOD PRESSURE: 121 MMHG | TEMPERATURE: 98.7 F | OXYGEN SATURATION: 98 %

## 2018-02-14 DIAGNOSIS — R92.1 BREAST CALCIFICATIONS: ICD-10-CM

## 2018-02-14 PROCEDURE — 27210207 MA STEREOTACTIC BREAST BIOPSY VACUUM ASST LEFT

## 2018-02-14 PROCEDURE — 88305 TISSUE EXAM BY PATHOLOGIST: CPT | Performed by: SURGERY

## 2018-02-14 PROCEDURE — 25000125 ZZHC RX 250: Performed by: RADIOLOGY

## 2018-02-14 PROCEDURE — 40000986 MA POST PROCEDURE LEFT

## 2018-02-14 RX ORDER — LIDOCAINE HYDROCHLORIDE 10 MG/ML
10 INJECTION, SOLUTION EPIDURAL; INFILTRATION; INTRACAUDAL; PERINEURAL ONCE
Status: COMPLETED | OUTPATIENT
Start: 2018-02-14 | End: 2018-02-14

## 2018-02-14 RX ORDER — LIDOCAINE HYDROCHLORIDE AND EPINEPHRINE 10; 10 MG/ML; UG/ML
10 INJECTION, SOLUTION INFILTRATION; PERINEURAL ONCE
Status: COMPLETED | OUTPATIENT
Start: 2018-02-14 | End: 2018-02-14

## 2018-02-14 RX ADMIN — LIDOCAINE HYDROCHLORIDE 100 MG: 10 INJECTION, SOLUTION INFILTRATION; PERINEURAL at 15:06

## 2018-02-14 RX ADMIN — LIDOCAINE HYDROCHLORIDE AND EPINEPHRINE 10 ML: 10; 10 INJECTION, SOLUTION INFILTRATION; PERINEURAL at 15:07

## 2018-02-14 NOTE — PROGRESS NOTES
Pressure held on biopsy site for 5 minutes. Steri stripa, sterile 2x2 placed over insertion site and covered with a sterile tegaderm.    Patient brought to mamms for post clip mammogram:  yes    Sports bra and small ice pad in place over dressing.

## 2018-02-14 NOTE — IP AVS SNAPSHOT
[facility]    [address]    [place] [address]    Phone:  [phone]    Fax:  [phone]                                       After Visit Summary   2/14/2018    Rakel Mckay    MRN: [id number]           After Visit Summary Signature Page     I have received my discharge instructions, and my questions have been answered. I have discussed any challenges I see with this plan with the nurse or doctor.    ..........................................................................................................................................  Patient/Patient Representative Signature      ..........................................................................................................................................  Patient Representative Print Name and Relationship to Patient    ..................................................               ................................................  Date                                            Time    ..........................................................................................................................................  Reviewed by Signature/Title    ...................................................              ..............................................  Date                                                            Time

## 2018-02-19 ENCOUNTER — DOCUMENTATION ONLY (OUTPATIENT)
Dept: FAMILY MEDICINE | Facility: OTHER | Age: 53
End: 2018-02-19

## 2018-02-19 RX ORDER — MUPIROCIN 20 MG/G
OINTMENT TOPICAL 3 TIMES DAILY
COMMUNITY
Start: 2018-01-24 | End: 2018-02-20

## 2018-02-20 ENCOUNTER — TELEPHONE (OUTPATIENT)
Dept: FAMILY MEDICINE | Facility: OTHER | Age: 53
End: 2018-02-20

## 2018-02-20 ENCOUNTER — OFFICE VISIT (OUTPATIENT)
Dept: SURGERY | Facility: OTHER | Age: 53
End: 2018-02-20
Attending: SURGERY
Payer: COMMERCIAL

## 2018-02-20 VITALS — DIASTOLIC BLOOD PRESSURE: 70 MMHG | SYSTOLIC BLOOD PRESSURE: 110 MMHG

## 2018-02-20 DIAGNOSIS — M26.609 TMJ (TEMPOROMANDIBULAR JOINT SYNDROME): Primary | ICD-10-CM

## 2018-02-20 DIAGNOSIS — N60.12 FIBROCYSTIC CHANGE OF BREAST, LEFT: ICD-10-CM

## 2018-02-20 DIAGNOSIS — N60.19 FIBROCYSTIC BREAST DISEASE (FCBD), UNSPECIFIED LATERALITY: ICD-10-CM

## 2018-02-20 DIAGNOSIS — R92.0 ABNORMAL MAMMOGRAM WITH MICROCALCIFICATION: Primary | ICD-10-CM

## 2018-02-20 PROCEDURE — 99243 OFF/OP CNSLTJ NEW/EST LOW 30: CPT | Performed by: SURGERY

## 2018-02-20 ASSESSMENT — PAIN SCALES - GENERAL: PAINLEVEL: NO PAIN (0)

## 2018-02-20 NOTE — MR AVS SNAPSHOT
"              After Visit Summary   2/20/2018    Rakel Mckay    MRN: [id number]           Patient Information     Date Of Birth          1965        Visit Information        Provider Department      2/20/2018 3:50 PM Liz Earl MD [facility]         Follow-ups after your visit        Your next 10 appointments already scheduled     Mar 05, 2018  8:15 AM CST   CONSULT with Romulo Pimentel MD   [facility] ([facility])    [address]  [place] [address] [phone]              Who to contact     If you have questions or need follow up information about today's clinic visit or your schedule please contact [facility] directly at [phone].  Normal or non-critical lab and imaging results will be communicated to you by [employer], letter or phone within 4 business days after the clinic has received the results. If you do not hear from us within 7 days, please contact the clinic through [employer] or phone. If you have a critical or abnormal lab result, we will notify you by phone as soon as possible.  Submit refill requests through [employer] or call your pharmacy and they will forward the refill request to us. Please allow 3 business days for your refill to be completed.          Additional Information About Your Visit        Datavolution[employer] Information     [employer] lets you send messages to your doctor, view your test results, renew your prescriptions, schedule appointments and more. To sign up, go to [email or web address] . Click on \"Log in\" on the left side of the screen, which will take you to the Welcome page. Then click on \"Sign up Now\" on the right side of the page.     You will be asked to enter the access code listed below, as well as some personal information. Please follow the directions to create your username and password.     Your access code is: [email or web address]  Expires: 5/15/2018  2:12 PM     Your access code " will  in 90 days. If you need help or a new code, please call your [place] clinic or [phone].        Care EveryWhere ID     This is your Care EveryWhere ID. This could be used by other organizations to access your [place] medical records  [id number]        Your Vitals Were     Last Period                   2018 (Exact Date)            Blood Pressure from Last 3 Encounters:   18 110/70   18 121/73   18 130/82    Weight from Last 3 Encounters:   18 71.6 kg (157 lb 12.8 oz)   16 70.8 kg (156 lb)   16 70.8 kg (156 lb)              Today, you had the following     No orders found for display       Primary Care Provider Office Phone # Fax #    Rosa Isela Cardozo [phone] [address] [facility] [address]        Equal Access to Services     [facility] : Hadii usman romero hadasho Soluciana, waaxda luqadaha, qaybta kaalmada adeegyada, jc allred . So [facility] [phone].    ATENCIÓN: Si pallavi talbert, tiene a dunn disposición servicios gratuitos de asistencia lingüística. Llame al [phone].    We comply with applicable federal civil rights laws and [place] laws. We do not discriminate on the basis of race, color, national origin, age, disability, sex, sexual orientation, or gender identity.            Thank you!     Thank you for choosing [facility] [place] [facility] hospitals  for your care. Our goal is always to provide you with excellent care. Hearing back from our patients is one way we can continue to improve our services. Please take a few minutes to complete the written survey that you may receive in the mail after your visit with us. Thank you!             Your Updated Medication List - Protect others around you: Learn how to safely use, store and throw away your medicines at www.disposemymeds.org.          This list is accurate as of 18  4:08 PM.  Always use your most recent med list.                    Brand Name Dispense Instructions for use Diagnosis    cyclobenzaprine 10 MG tablet    FLEXERIL     Take 1 tablet by mouth nightly as needed for muscle spasms

## 2018-02-20 NOTE — PROGRESS NOTES
OFFICE CONSULTATION NOTE  Patient Name: Rakel Mckay  Address: [address] [facility] [address]  Age:52 year old  Sex: female     Primary Care Physician: Rosa Isela Cardozo    I was requested to see thispatient in consultation by Rosa Isela Cardozo for evaluation of left breast microcalcifications. A copy of this note will be sent to Rosa Isela Cardozo.    HPI:   The patient is 52 year old female with new microcalcifications noted in the left breast on recent mammogram. The patient hasn't noted any skin, nipple or breast changes. No previous breast cancer. No previous breast biopsy. No family history of breast cancer. The patient had biopsy performed that showed proliferative fibrocystic change.      CONSULTATION ASSESSMENT AND PLAN/RECOMMENDATIONS:   I discussed with the patient the pathophysiology of microcalcifications and breast disease. We specifically discussed that most abnormalities seen on mammogram and US are not breast cancer. I explained that fibrocystic change is not a precancerous condition and that it doesn't increase future risk for breast cancer. I recommended a 6 month left breast mammogram to follow up breast changes after the recent biopsy. The patient expressed understanding and denies further questions. The patient will call with questions or concerns.     REVIEW OF SYSTEMS  GENERAL: No fevers or chills. Denies fatigue, recent weight loss.  HEENT: No sinus drainage. No changeswith vision or hearing. No difficulty swallowing.   LYMPHATICS:  No swollen nodes in axilla, neck or groin.  CARDIOVASCULAR: Denies chest pain, palpitations and dyspnea on exertion.  PULMONARY: No shortness of breath or cough. No increase in sputum production.  GI: Denies melena, bright red blood in stools. No hematemesis. No constipation or diarrhea.  : No dysuria or hematuria.  SKIN: No recent rashes or ulcers.   HEMATOLOGY:  No history of easy bruising or bleeding.  ENDOCRINE:  No history of diabetes or thyroid  problems.  NEUROLOGY:  No history of seizures or headaches. No motor or sensory changes.  BREAST:  Denies breast pain or changes.    PAST MEDICAL HISTORY    Past Medical History:   Diagnosis Date     Diffuse cystic mastopathy of breast     No Comments Provided     Encounter for screening for malignant neoplasm of colon     11/11/2014     Female infertility     Secondary to Chlamydia and pelvic infections     Other chest pain     No Comments Provided     Other specified postprocedural states     12/8/2016     Personal history of other mental and behavioral disorders     with fatigue     Superficial foreign body of right foot     11/29/2016     PAST SURGICAL HISTORY    Past Surgical History:   Procedure Laterality Date     COLONOSCOPY      11/13/2014,Normal 10 year follow up     OTHER SURGICAL HISTORY      [id number],OTHER,operated in infancy     OTHER SURGICAL HISTORY      1997,[id number],OTHER,in [place], [place]     OTHER SURGICAL HISTORY      12/08/2016,[id number],OTHER,Right     TONSILLECTOMY      No Comments Provided     TYMPANOSTOMY, LOCAL/TOPICAL ANESTHESIA      Bilateral TM perforations and tympanostomies in childhood     CURRENT MEDS    Current Outpatient Prescriptions   Medication     cyclobenzaprine (FLEXERIL) 10 MG tablet     No current facility-administered medications for this visit.      ALLERGIES/SENSITIVITIES  No Known Allergies  FAMILY HISTORY    Family History   Problem Relation Age of Onset     HEART DISEASE Mother      Heart Disease,MI at age 40 with subsequent CHF, lung cancer, and chronic obstructive pulmonary disease, doing well, TIAs     CANCER Mother      Cancer, lung cancer     Other - See Comments Mother      COPD/Stroke,TIAs     Other - See Comments Father      hypothyroid     Prostate Cancer Paternal Grandfather      Cancer-prostate     Breast Cancer No family hx of      Cancer-breast     SOCIAL HISTORY  Social History     Social History     Marital status:      Spouse name: N/A     Number of  children: N/A     Years of education: N/A     Social History Main Topics     Smoking status: Never Smoker     Smokeless tobacco: Never Used     Alcohol use No     Drug use: None      Comment: Drug use: No     Sexual activity: Yes     Partners: Male     Other Topics Concern     None     Social History Narrative    , employed at [employer].      Drew Spouse, employed at [employer]    Daughter, born 02/01     The above history was reviewed today, 2/20/2018  PHYSICAL EXAM  /70 (BP Location: Right arm, Patient Position: Sitting, Cuff Size: Adult Regular)  LMP 01/05/2018 (Exact Date)'  There is no height or weight on file to calculate BMI.    GENERAL: Healthy appearing patient in no acute distress. Pleasant and cooperative with exam and interview.   HEENT:Head-normocephalic. Eyes-no scleral icterus, pupils equal, round, and reactive to light. Nose-no nasal drainage. No lesions. Mouth-oral mucosa pink and moist, no lesions.  NECK: Supple. No thyroid nodules. Tracheamidline.  LYMPHATICS:  No cervical, axillary or supraclavicular adenopathy.  CV: Regular rate and rhythm, no murmurs. No peripheral edema.  LUNGS:  No respiratory distress. Clear bilaterally to auscultation.  ABDOMEN: Non distended. Bowel sounds active. Soft, non-tender, no hepatosplenomegaly or hernias. No peritoneal signs.  SKIN: Pink, warm and dry. No jaundice. No rash.  NEURO:  Cranial nerves II-XII grossly intact.Alert and oriented.  PSYCH: Appropriate mood and affect.  BREAST: Breasts were examined in the seated and supine position. No mass noted bilaterally. No nipple changes or discharge bilaterally. Post biopsy changes noted left breast. Resolving ecchymosis with no sign of infection. Appropriate tenderness noted.    IMAGING/LAB  I personally reviewed patient's recent mammogram and biopsy images and reports and pathology report.    Liz Earl

## 2018-02-20 NOTE — NURSING NOTE
Here today to follow up with breast biopsy results.\  Melissa Cuello LPN..........2/20/2018  3:38 PM

## 2018-02-21 NOTE — TELEPHONE ENCOUNTER
Patient states she has had a sore neck and jaw over the past month. Also an earache which she had evaluated at her last clinic appointment but there was no infection. She went to the dentist and he said there was not a problem with her teeth. She is wondering about an ENT consultation.

## 2018-03-05 ENCOUNTER — OFFICE VISIT (OUTPATIENT)
Dept: OBGYN | Facility: OTHER | Age: 53
End: 2018-03-05
Attending: OBSTETRICS & GYNECOLOGY
Payer: COMMERCIAL

## 2018-03-05 VITALS
BODY MASS INDEX: 25.1 KG/M2 | DIASTOLIC BLOOD PRESSURE: 80 MMHG | WEIGHT: 159.9 LBS | HEART RATE: 76 BPM | SYSTOLIC BLOOD PRESSURE: 102 MMHG | HEIGHT: 67 IN

## 2018-03-05 DIAGNOSIS — N83.202 CYST OF LEFT OVARY: Primary | ICD-10-CM

## 2018-03-05 DIAGNOSIS — N95.1 PERIMENOPAUSE: ICD-10-CM

## 2018-03-05 LAB — FSH SERPL-ACNC: 36.8 IU/L

## 2018-03-05 PROCEDURE — 99203 OFFICE O/P NEW LOW 30 MIN: CPT | Performed by: OBSTETRICS & GYNECOLOGY

## 2018-03-05 PROCEDURE — 83001 ASSAY OF GONADOTROPIN (FSH): CPT | Performed by: OBSTETRICS & GYNECOLOGY

## 2018-03-05 PROCEDURE — 36415 COLL VENOUS BLD VENIPUNCTURE: CPT | Performed by: OBSTETRICS & GYNECOLOGY

## 2018-03-05 ASSESSMENT — ANXIETY QUESTIONNAIRES
2. NOT BEING ABLE TO STOP OR CONTROL WORRYING: NOT AT ALL
1. FEELING NERVOUS, ANXIOUS, OR ON EDGE: NOT AT ALL

## 2018-03-05 ASSESSMENT — ENCOUNTER SYMPTOMS
NIGHT SWEATS: 1
DYSURIA: 0
FLANK PAIN: 0
CONSTIPATION: 0
HOT FLASHES: 1
DIARRHEA: 0
FEVER: 0
DEPRESSION: 0
ANOREXIA: 0
VOMITING: 0
ABDOMINAL SWELLING: 0
NAUSEA: 0
NERVOUS/ANXIOUS: 0
ABDOMINAL PAIN: 0
FREQUENCY: 0
HEMATURIA: 0

## 2018-03-05 ASSESSMENT — PAIN SCALES - GENERAL: PAINLEVEL: NO PAIN (0)

## 2018-03-05 NOTE — MR AVS SNAPSHOT
"              After Visit Summary   3/5/2018    Rakel Mckay    MRN: [id number]           Patient Information     Date Of Birth          1965        Visit Information        Provider Department      3/5/2018 8:15 AM Romulo Pimentel MD [facility]        Today's Diagnoses     Cyst of left ovary    -  1    Perimenopause           Follow-ups after your visit        Follow-up notes from your care team     Return in about 6 months (around 9/5/2018).      Future tests that were ordered for you today     Open Standing Orders        Priority Remaining Interval Expires Ordered    US Pelvic Complete with Transvaginal Routine 1/1 1 TIME IMAGING  3/5/2018     Routine 1/1 AM DRAW  3/5/2018            Who to contact     If you have questions or need follow up information about today's clinic visit or your schedule please contact [facility] directly at [phone].  Normal or non-critical lab and imaging results will be communicated to you by MyChart, letter or phone within 4 business days after the clinic has received the results. If you do not hear from us within 7 days, please contact the clinic through [employer] or phone. If you have a critical or abnormal lab result, we will notify you by phone as soon as possible.  Submit refill requests through [employer] or call your pharmacy and they will forward the refill request to us. Please allow 3 business days for your refill to be completed.          Additional Information About Your Visit        MyChart Information     [employer] lets you send messages to your doctor, view your test results, renew your prescriptions, schedule appointments and more. To sign up, go to [email or web address] . Click on \"Log in\" on the left side of the screen, which will take you to the Welcome page. Then click on \"Sign up Now\" on the right side of the page.     You will be asked to enter the access code listed below, as well as some personal " "information. Please follow the directions to create your username and password.     Your access code is: [email or web address]  Expires: 5/15/2018  2:12 PM     Your access code will  in 90 days. If you need help or a new code, please call your [place] clinic or [phone].        Care EveryWhere ID     This is your Care EveryWhere ID. This could be used by other organizations to access your [facility] medical records  [id number]        Your Vitals Were     Pulse Height BMI (Body Mass Index)             76 1.702 m (5' 7\") 25.04 kg/m2          Blood Pressure from Last 3 Encounters:   18 102/80   18 110/70   18 121/73    Weight from Last 3 Encounters:   18 72.5 kg (159 lb 14.4 oz)   18 71.6 kg (157 lb 12.8 oz)   16 70.8 kg (156 lb)              We Performed the Following     Follicle stimulating hormone        Primary Care Provider Office Phone # Fax #    Rosa Isela Cardozo [phone] [address] [facility] [address]        Equal Access to Services     YASSINE ANAYA AH: Hadii usman torreso Soluciana, waaxda luboyadaha, qaybta kaalmada adeclaudetteda, jc yo. So [facility] [phone].    ATENCIÓN: Si habla español, tiene a dunn disposición servicios gratuitos de asistencia lingüística. Llame al [phone].    We comply with applicable federal civil rights laws and [place] laws. We do not discriminate on the basis of race, color, national origin, age, disability, sex, sexual orientation, or gender identity.            Thank you!     Thank you for choosing [facility] \[facility]\""  for your care. Our goal is always to provide you with excellent care. Hearing back from our patients is one way we can continue to improve our services. Please take a few minutes to complete the written survey that you may receive in the mail after your visit with us. Thank you!             Your Updated Medication List - Protect others around " you: Learn how to safely use, store and throw away your medicines at www.disposemymeds.org.          This list is accurate as of 3/5/18  8:49 AM.  Always use your most recent med list.                   Brand Name Dispense Instructions for use Diagnosis    cyclobenzaprine 10 MG tablet    FLEXERIL     Take 1 tablet by mouth nightly as needed for muscle spasms

## 2018-03-05 NOTE — NURSING NOTE
Patient presents to the clinic for a consult regarding an Adnexal cyst.  Gene Sagastume ..............3/5/2018 8:13 AM

## 2018-03-05 NOTE — PROGRESS NOTES
HPI Comments: Rakel has an incidental 2 cm left ovarian cyst found during a pelvic u/s.  The u/s was ordered due to her irregular menses and vasomotor symptoms.    Vaginal Problem    This is a new problem. The current episode started more than 1 week ago. The discharge was normal. She is not pregnant. She has missed her period. Pertinent negatives include no anorexia, no fever, no abdominal swelling, no abdominal pain, no constipation, no diarrhea, no nausea, no vomiting, no dyspareunia, no dysuria and no frequency. Her past medical history is significant for irregular periods.     Past Medical History:   Diagnosis Date     Diffuse cystic mastopathy of breast     No Comments Provided     Encounter for screening for malignant neoplasm of colon     11/11/2014     Female infertility     Secondary to Chlamydia and pelvic infections     Other chest pain     No Comments Provided     Other specified postprocedural states     12/8/2016     Personal history of other mental and behavioral disorders     with fatigue     Superficial foreign body of right foot     11/29/2016     Past Surgical History:   Procedure Laterality Date     COLONOSCOPY      11/13/2014,Normal 10 year follow up     OTHER SURGICAL HISTORY      [id number],OTHER,operated in infancy     OTHER SURGICAL HISTORY      1997,[id number],OTHER,in [place], [place]     OTHER SURGICAL HISTORY      12/08/2016,[id number],OTHER,Right     TONSILLECTOMY      No Comments Provided     TYMPANOSTOMY, LOCAL/TOPICAL ANESTHESIA      Bilateral TM perforations and tympanostomies in childhood     PROCEDURE: US PELVIS COMPLETE TV     HISTORY: Cervical polyp. LMP 01/05/2018     TECHNIQUE: Transabdominal and transvaginal ultrasound of the pelvis.     COMPARISON: 12/17/2013     FINDINGS:     MEASUREMENTS:  Uterus: 7.6 x 5.7 x 8.4 (Length x Height x Width)  Endometrium: 8 mm in thickness     UTERUS: The uterus is normal in size and contour. The endometrium is normal in thickness, with mildly heterogeneous  margins. There is no myometrial mass. Nabothian cysts are noted.     ADNEXA: Neither ovary is definitively identified. A small cyst is questioned in the deep left adnexa measuring less than 2 cm, potentially within the left ovary and similar in appearance to the prior from 2013.     MISC: There is no free fluid in the pelvis.     IMPRESSION:  Nabothian cysts.     Electronically Signed By: Chevy Diaz on 2/1/2018 3:21 PM      Review of Systems     Constitutional:  Positive for night sweats. Negative for fever.   Gastrointestinal:  Negative for nausea, vomiting, abdominal pain, diarrhea, constipation and anorexia.   Genitourinary:  Positive for vaginal discharge, menstrual changes and hot flashes. Negative for dysuria, frequency, hematuria, flank pain and dyspareunia.   Psychiatric/Behavioral:  Negative for depression.          Physical Exam   Constitutional: She is oriented to person, place, and time and well-developed, well-nourished, and in no distress.   HENT:   Head: Normocephalic and atraumatic.   Eyes: Pupils are equal, round, and reactive to light.   Neurological: She is alert and oriented to person, place, and time.   Skin: Skin is warm and dry.   Psychiatric: Mood, memory, affect and judgment normal.     Impression:  Asymptomatic simple left ovarian cyst, apparently unchanged from 2013 ultrasound.  She admits increasing menstrual irregularity with vasomotor symptoms consistent with perimenopause.    Plan: I suggested checking an FSH and  level.  If  level is in normal range, then repeat u/s in 6 months to ensure no interval change.  She agrees with the paln and we'll contact her with her lab results.  Future order for repeat u/s in September has been placed.    SUNNY BOOTH MD on 3/5/2018 at 8:48 AM

## 2018-03-05 NOTE — LETTER
3/5/2018       RE: Rakel Mckay  [address]  [facility] [address]     Dear Colleague,    Thank you for referring your patient, Rakel Mckay, to the [facility]. Please see a copy of my visit note below.    HPI Comments: Rakel has an incidental 2 cm left ovarian cyst found during a pelvic u/s.  The u/s was ordered due to her irregular menses and vasomotor symptoms.    Vaginal Problem    This is a new problem. The current episode started more than 1 week ago. The discharge was normal. She is not pregnant. She has missed her period. Pertinent negatives include no anorexia, no fever, no abdominal swelling, no abdominal pain, no constipation, no diarrhea, no nausea, no vomiting, no dyspareunia, no dysuria and no frequency. Her past medical history is significant for irregular periods.     Past Medical History:   Diagnosis Date     Diffuse cystic mastopathy of breast     No Comments Provided     Encounter for screening for malignant neoplasm of colon     11/11/2014     Female infertility     Secondary to Chlamydia and pelvic infections     Other chest pain     No Comments Provided     Other specified postprocedural states     12/8/2016     Personal history of other mental and behavioral disorders     with fatigue     Superficial foreign body of right foot     11/29/2016     Past Surgical History:   Procedure Laterality Date     COLONOSCOPY      11/13/2014,Normal 10 year follow up     OTHER SURGICAL HISTORY      [id number],OTHER,operated in infancy     OTHER SURGICAL HISTORY      1997,[id number],OTHER,in [place], [place]     OTHER SURGICAL HISTORY      12/08/2016,[id number],OTHER,Right     TONSILLECTOMY      No Comments Provided     TYMPANOSTOMY, LOCAL/TOPICAL ANESTHESIA      Bilateral TM perforations and tympanostomies in childhood     PROCEDURE: US PELVIS COMPLETE TV     HISTORY: Cervical polyp. LMP 01/05/2018     TECHNIQUE: Transabdominal and  transvaginal ultrasound of the pelvis.     COMPARISON: 12/17/2013     FINDINGS:     MEASUREMENTS:  Uterus: 7.6 x 5.7 x 8.4 (Length x Height x Width)  Endometrium: 8 mm in thickness     UTERUS: The uterus is normal in size and contour. The endometrium is normal in thickness, with mildly heterogeneous margins. There is no myometrial mass. Nabothian cysts are noted.     ADNEXA: Neither ovary is definitively identified. A small cyst is questioned in the deep left adnexa measuring less than 2 cm, potentially within the left ovary and similar in appearance to the prior from 2013.     MISC: There is no free fluid in the pelvis.     IMPRESSION:  Nabothian cysts.     Electronically Signed By: Chevy Diaz on 2/1/2018 3:21 PM      Review of Systems     Constitutional:  Positive for night sweats. Negative for fever.   Gastrointestinal:  Negative for nausea, vomiting, abdominal pain, diarrhea, constipation and anorexia.   Genitourinary:  Positive for vaginal discharge, menstrual changes and hot flashes. Negative for dysuria, frequency, hematuria, flank pain and dyspareunia.   Psychiatric/Behavioral:  Negative for depression.          Physical Exam   Constitutional: She is oriented to person, place, and time and well-developed, well-nourished, and in no distress.   HENT:   Head: Normocephalic and atraumatic.   Eyes: Pupils are equal, round, and reactive to light.   Neurological: She is alert and oriented to person, place, and time.   Skin: Skin is warm and dry.   Psychiatric: Mood, memory, affect and judgment normal.     Impression:  Asymptomatic simple left ovarian cyst, apparently unchanged from 2013 ultrasound.  She admits increasing menstrual irregularity with vasomotor symptoms consistent with perimenopause.    Plan: I suggested checking an FSH and  level.  If  level is in normal range, then repeat u/s in 6 months to ensure no interval change.  She agrees with the paln and we'll contact her with her lab results.   Future order for repeat u/s in September has been placed.        Again, thank you for allowing me to participate in the care of your patient.      Sincerely,    SUNNY BOOTH MD

## 2018-03-13 ENCOUNTER — OFFICE VISIT (OUTPATIENT)
Dept: OTOLARYNGOLOGY | Facility: OTHER | Age: 53
End: 2018-03-13
Attending: OTOLARYNGOLOGY
Payer: COMMERCIAL

## 2018-03-13 DIAGNOSIS — Z00.00 ROUTINE GENERAL MEDICAL EXAMINATION AT A HEALTH CARE FACILITY: Primary | ICD-10-CM

## 2018-03-13 PROCEDURE — G0463 HOSPITAL OUTPT CLINIC VISIT: HCPCS

## 2018-03-13 NOTE — MR AVS SNAPSHOT
After Visit Summary   3/13/2018    Rakel Mckay    MRN: [id number]           Patient Information     Date Of Birth          1965        Visit Information        Provider Department      3/13/2018 12:00 PM Jacob Duong MD [facility]        Today's Diagnoses     Routine general medical examination at a health care facility    -  1       Follow-ups after your visit        Your next 10 appointments already scheduled     Mar 13, 2018 12:00 PM CDT   SHORT with Jacob Duong MD   [facility] ([facility])    [address]  [place] [address]   [phone]            Sep 05, 2018  8:15 AM CDT   US PELVIC COMPLETE W TRANSVAGINAL with GHUS1   [facility] ([facility]    [address]  [place] [address]   [phone]           Please bring a list of your medicines (including vitamins, minerals and over-the-counter drugs). Also, tell your doctor about any allergies you may have. Wear comfortable clothes and leave your valuables at home.  Adults: Drink six 8-ounce glasses of fluid one hour before your exam. Do NOT empty your bladder.  If you need to empty your bladder before your exam, try to release only a little bit of urine. Then, drink another 8oz glass of fluid.  Children: Children who are potty trained should drink at least 4 cups (32 oz) of liquid 45 minutes to one hour prior to the exam. The child s bladder must be full in order to achieve a diagnostic exam. If your child is very uncomfortable or has an urgent need to pee, please notify a technologist; they will try to find out how much longer the wait may be and provide instructions to help relieve the pressure. Occasionally it is medically necessary to insert a urinary catheter to fill the bladder.  Please call the Imaging Department at your exam site with any questions.              Who to contact     If  "you have questions or need follow up information about today's clinic visit or your schedule please contact [facility] directly at [phone].  Normal or non-critical lab and imaging results will be communicated to you by [employer] TrailMap[employer]art, letter or phone within 4 business days after the clinic has received the results. If you do not hear from us within 7 days, please contact the clinic through [employer]. TrailMapshart or phone. If you have a critical or abnormal lab result, we will notify you by phone as soon as possible.  Submit refill requests through Cat Amania or call your pharmacy and they will forward the refill request to us. Please allow 3 business days for your refill to be completed.          Additional Information About Your Visit        U.S. TrailMapsharScreen [employer] [place] Information     Cat Amania lets you send messages to your doctor, view your test results, renew your prescriptions, schedule appointments and more. To sign up, go to [email or web address] . Click on \"Log in\" on the left side of the screen, which will take you to the Welcome page. Then click on \"Sign up Now\" on the right side of the page.     You will be asked to enter the access code listed below, as well as some personal information. Please follow the directions to create your username and password.     Your access code is: [email or web address]  Expires: 5/15/2018  3:12 PM     Your access code will  in 90 days. If you need help or a new code, please call your [place] clinic or [phone].        Care EveryWhere ID     This is your Care EveryWhere ID. This could be used by other organizations to access your [place] medical records  [id number]         Blood Pressure from Last 3 Encounters:   18 102/80   18 110/70   18 121/73    Weight from Last 3 Encounters:   18 72.5 kg (159 lb 14.4 oz)   18 71.6 kg (157 lb 12.8 oz)   16 70.8 kg (156 lb)              Today, you had the following     No orders found for display       Primary Care " Provider Office Phone # Fax #    Rosa Isela Cardozo [phone] [address]  [facility] [address]        Equal Access to Services     VANIAYASSINE HÉCTOR : Hadii aad ku hadfayelizabeth Nathalieluciana, wablaiseda atiyafidelinaha, qachristophta kanathanda jennifer, jc augustin hayaamaría douglasанна castanophilip yo. So [facility] [phone].    ATENCIÓN: Si habla español, tiene a dunn disposición servicios gratuitos de asistencia lingüística. Llame al [phone].    We comply with applicable federal civil rights laws and [place] laws. We do not discriminate on the basis of race, color, national origin, age, disability, sex, sexual orientation, or gender identity.            Thank you!     Thank you for choosing [facility]  for your care. Our goal is always to provide you with excellent care. Hearing back from our patients is one way we can continue to improve our services. Please take a few minutes to complete the written survey that you may receive in the mail after your visit with us. Thank you!             Your Updated Medication List - Protect others around you: Learn how to safely use, store and throw away your medicines at www.disposemymeds.org.          This list is accurate as of 3/13/18 11:57 AM.  Always use your most recent med list.                   Brand Name Dispense Instructions for use Diagnosis    cyclobenzaprine 10 MG tablet    FLEXERIL     Take 1 tablet by mouth nightly as needed for muscle spasms

## 2018-03-22 DIAGNOSIS — L01.00 IMPETIGO: Primary | ICD-10-CM

## 2018-03-26 NOTE — TELEPHONE ENCOUNTER
Bactroban  Last Written Prescription Date:  1/24/2018  Last Fill Quantity: 15 g,   # refills: 0  Last Office Visit: 01/24/2018  Future Office visit:       Routing refill request to provider for review/approval because:  Drug not on the [facility], P or [facility] refill protocol or controlled substance    Please review and advise or sign if appropriate  Unable to complete prescription refill per RN Medication Refill Policy.................... Keyla Walters ....................  3/26/2018   1:34 PM

## 2018-03-27 RX ORDER — MUPIROCIN 20 MG/G
OINTMENT TOPICAL
Qty: 22 G | Refills: 1 | Status: SHIPPED | OUTPATIENT
Start: 2018-03-27 | End: 2018-12-05

## 2018-05-01 ENCOUNTER — TELEPHONE (OUTPATIENT)
Dept: FAMILY MEDICINE | Facility: OTHER | Age: 53
End: 2018-05-01

## 2018-05-01 NOTE — TELEPHONE ENCOUNTER
Called and spoke to Patient after verifying last name and date of birth. Per last OV with Dr. Pimentel, Patient was to have FSH and  checked. It does not appear as though  was checked. Patient notified of this information and it was recommended that Patient schedule lab only appointment to have this lab done and then we will call back with the results. If the results are normal, the original plan for 6 month f/u would likely be adequate. Patient has not chosen which OB/GYN provider she plans to f/u with, in the absence of Dr. Pimentel. Once decided, order can be placed.    Patient was just going into a meeting and states she will call back.    Cassandra Oden RN .............. 5/1/2018  11:04 AM

## 2018-05-01 NOTE — TELEPHONE ENCOUNTER
SER-Pt has not heard anything re: lab/test done with Dr. Pimentel. Please call. Thank You.  Shaniqua Molina

## 2018-06-08 ENCOUNTER — TELEPHONE (OUTPATIENT)
Dept: FAMILY MEDICINE | Facility: OTHER | Age: 53
End: 2018-06-08

## 2018-06-08 ENCOUNTER — TELEPHONE (OUTPATIENT)
Dept: OBGYN | Facility: OTHER | Age: 53
End: 2018-06-08

## 2018-06-08 DIAGNOSIS — Z87.898 HISTORY OF ABNORMAL MAMMOGRAM: ICD-10-CM

## 2018-06-08 DIAGNOSIS — N83.202 CYST OF LEFT OVARY: Primary | ICD-10-CM

## 2018-06-08 DIAGNOSIS — Z98.890 H/O LEFT BREAST BIOPSY: Primary | ICD-10-CM

## 2018-06-08 NOTE — TELEPHONE ENCOUNTER
Spoke with patient after verifying last name and birth date, informed of Ambar Alvarez NP note below.  Erendira Aiken LPN 6/8/2018 3:22 PM

## 2018-06-08 NOTE — TELEPHONE ENCOUNTER
Ordered left digital diagnostic mammography-per Dr. Earl recommendation--should be August   Ambar Alvarez, APRN CNP   June 8, 2018   --

## 2018-06-08 NOTE — TELEPHONE ENCOUNTER
Spoke with patient who confirmed her last name and birth date. Patient stated she had a breast biopsy in February and was supposed to have a follow up mammogram sometime before September. She stated she used to see  and wonders if the follow up mammogram was never ordered. Please advise  Erendira Aiken LPN 6/8/2018 2:38 PM

## 2018-06-08 NOTE — TELEPHONE ENCOUNTER
Former [facility] patient wishes to continue care with [facility]. Order for  placed and patient advised if that is normal, she will need to follow up with [facility] and have repeat ultrasound in September.    Lucretia Alonso RN...................6/8/2018 1:13 PM

## 2018-06-14 DIAGNOSIS — N83.202 CYST OF LEFT OVARY: ICD-10-CM

## 2018-06-14 LAB — CANCER AG125 SERPL-ACNC: 9 U/ML (ref 0–35)

## 2018-06-14 PROCEDURE — 86304 IMMUNOASSAY TUMOR CA 125: CPT | Performed by: OBSTETRICS & GYNECOLOGY

## 2018-06-14 PROCEDURE — 36415 COLL VENOUS BLD VENIPUNCTURE: CPT | Performed by: OBSTETRICS & GYNECOLOGY

## 2018-07-23 NOTE — PROGRESS NOTES
Patient Information     Patient Name  Rakel Mckay MRN  [id number] Sex  Female   1965      Letter by Rosa Isela Cardozo MD at      Author:  Rosa Isela Cardozo MD Service:  (none) Author Type:  (none)    Filed:   Encounter Date:  2018 Status:  (Other)           Rakel SORIA Wass  [address] [facility] [address]          2018    Dear Ms. Mckay:    Following are the tests completed during your last clinic visit.  The results of these tests are normal and require no further attention unless otherwise noted.  The pathology of your cervical polyp and Pap smear will be sent under separate letter. Please keep your appointment for your pelvic ultrasound and a mammogram.    Results for orders placed or performed in visit on 18      COMP METABOLIC PANEL      Result  Value Ref Range    SODIUM 139 133 - 143 mmol/L    POTASSIUM 3.9 3.5 - 5.1 mmol/L    CHLORIDE 105 98 - 107 mmol/L    CO2,TOTAL 25 21 - 31 mmol/L    ANION GAP 9 5 - 18                    GLUCOSE 93 70 - 105 mg/dL    CALCIUM 9.2 8.6 - 10.3 mg/dL    BUN 13 7 - 25 mg/dL    CREATININE 0.76 0.70 - 1.30 mg/dL    BUN/CREAT RATIO           17                    GFR if African American >60 >60 ml/min/1.73m2    GFR if not African American >60 >60 ml/min/1.73m2    ALBUMIN 4.5 3.5 - 5.7 g/dL    PROTEIN,TOTAL 6.8 6.4 - 8.9 g/dL    GLOBULIN                  2.3 2.0 - 3.7 g/dL    A/G RATIO 2.0 1.0 - 2.0                    BILIRUBIN,TOTAL 0.6 0.3 - 1.0 mg/dL    ALK PHOSPHATASE 79 34 - 104 IU/L    ALT (SGPT) 35 7 - 52 IU/L    AST (SGOT) 24 13 - 39 IU/L   TSH      Result  Value Ref Range    TSH 2.76 0.34 - 5.60 uIU/mL   LIPID PANEL      Result  Value Ref Range    CHOLESTEROL,TOTAL 186 <200 mg/dL    TRIGLYCERIDES 75 <150 mg/dL    HDL CHOLESTEROL 55 23 - 92 mg/dL    NON-HDL CHOLESTEROL 131 <145 mg/dl    CHOL/HDL RATIO            3.38 <4.50                    LDL CHOLESTEROL 116 (H) <100 mg/dL    PROVIDER ORDERED STATUS FASTING     VITAMIN B12      Result  Value Ref Range    VITAMIN B12 444 180 - 914 pg/mL   CBC WITH AUTO DIFFERENTIAL      Result  Value Ref Range    WHITE BLOOD COUNT         7.4 4.5 - 11.0 thou/cu mm    RED BLOOD COUNT           4.56 4.00 - 5.20 mil/cu mm    HEMOGLOBIN                11.9 (L) 12.0 - 16.0 g/dL    HEMATOCRIT                37.8 33.0 - 51.0 %    MCV                       83 80 - 100 fL    MCH                       26.1 26.0 - 34.0 pg    MCHC                      31.5 (L) 32.0 - 36.0 g/dL    RDW                       14.6 11.5 - 15.5 %    PLATELET COUNT            227 140 - 440 thou/cu mm    MPV                       11.0 6.5 - 11.0 fL    NEUTROPHILS               63.4 42.0 - 72.0 %    LYMPHOCYTES               28.4 20.0 - 44.0 %    MONOCYTES                 7.1 <12.0 %    EOSINOPHILS               0.7 <8.0 %    BASOPHILS                 0.3 <3.0 %    IMMATURE GRANULOCYTES(METAS,MYELOS,PROS) 0.1 %    ABSOLUTE NEUTROPHILS      4.7 1.7 - 7.0 thou/cu mm    ABSOLUTE LYMPHOCYTES      2.1 0.9 - 2.9 thou/cu mm    ABSOLUTE MONOCYTES        0.5 <0.9 thou/cu mm    ABSOLUTE EOSINOPHILS      0.1 <0.5 thou/cu mm    ABSOLUTE BASOPHILS        0.0 <0.3 thou/cu mm    ABSOLUTE IMMATURE GRANULOCYTES(METAS,MYELOS,PROS) 0.0 <=0.3 thou/cu mm          If you have any further questions or problems contact my office at the above number.  I trust this finds you in good health and spirits.      Sincerely,         Electronically signed by Rosa Isela Cardozo MD

## 2018-07-23 NOTE — PROGRESS NOTES
Patient Information     Patient Name  Rakel Mckay MRN  [id number] Sex  Female   1965      Letter by Rosa Isela Cardozo MD at      Author:  Rosa Isela Cardozo MD Service:  (none) Author Type:  (none)    Filed:   Encounter Date:  12/15/2017 Status:  (Other)           Rakel Stanleys  [address] [facility] [address]          2017    Dear Ms. Mckay:    This letter is to remind you that you are due for your annual exam with Rosa Isela Cardozo MD. Your last comprehensive visit was more than 12 months ago.    A refill request of cyclobenzaprine has been sent to  for review.   Please call the clinic at [phone] to schedule your appointment.    If you should require additional refills before your scheduled appointment, please contact your pharmacy and we will refill your medication until the date of your appointment.    If you are no longer seeing Rosa Isela Cardozo MD for primary care, please call to let us know. Doing so will remove you from our call/contact list.      Thank you for choosing [facility] and [facility] for your health care needs.    Sincerely,    Refill RN  [facility]

## 2018-07-24 NOTE — PROGRESS NOTES
Patient Information     Patient Name  Rakel Mckay MRN  [id number] Sex  Female   1965      Letter by Cassandra Crews MD at      Author:  Cassandra Crews MD Service:  (none) Author Type:  (none)    Filed:   Date of Service:   Status:  (Other)       [facility]  [address]  [facility] [address]  [phone]         Rakel Mckay   [address] [facility] [address]      2018  Date of Breast Imagin2018 12:36 PM    Dear Ms. Mckay:    Your recent breast imaging examination showed an abnormality that requires a biopsy or surgical consultation.     If a biopsy is recommended, the area of concern will be sampled or surgically removed and these samples will be sent for analysis.  When results are available, your health care provider or our facility will contact you concerning the results and any follow up tests or appointments that may be recommended.    A report of your results was sent to your health care provider(s). He or she has been informed about the need for biopsy. Please call your health care provider as soon as possible to schedule an appointment for this procedure if one has not already been scheduled for you.    You are responsible for informing any new health care provider or breast imaging facility of the date and location of this examination.    If you notice any new changes in your breast(s) please inform your health care provider without delay.    Thank you for choosing [facility] to participate in your healthcare needs.        [facility] Recommendations for Early Breast Cancer Detection   in Women without Symptoms  When to start having mammograms to screen for breast cancer, and how often to have them, is a personal decision. It should be based on your preferences, your values and your risk for developing breast cancer. [facility] recommends that you and your health care provider  together determine when mammograms are right for you.    [facility] recommends the following guidelines for women who have an average risk for breast cancer, based on American Cancer Society guidelines:    Age 40 to 44: Mammograms are optional.     Age 45 to 54: Have a mammogram every year.           Age 55 and older: Have a mammogram every year, or transition to having one every 2 years. Continue to have mammograms as long as your health is good.    If you have a higher than average risk for breast cancer, your health care provider may recommend a different schedule.

## 2018-09-05 ENCOUNTER — HOSPITAL ENCOUNTER (OUTPATIENT)
Dept: ULTRASOUND IMAGING | Facility: OTHER | Age: 53
Discharge: HOME | End: 2018-09-05
Attending: OBSTETRICS & GYNECOLOGY | Admitting: OBSTETRICS & GYNECOLOGY
Payer: COMMERCIAL

## 2018-09-05 ENCOUNTER — HOSPITAL ENCOUNTER (OUTPATIENT)
Dept: MAMMOGRAPHY | Facility: OTHER | Age: 53
End: 2018-09-05
Attending: NURSE PRACTITIONER
Payer: COMMERCIAL

## 2018-09-05 DIAGNOSIS — N83.202 CYST OF LEFT OVARY: ICD-10-CM

## 2018-09-05 DIAGNOSIS — Z98.890 H/O LEFT BREAST BIOPSY: ICD-10-CM

## 2018-09-05 DIAGNOSIS — Z87.898 HISTORY OF ABNORMAL MAMMOGRAM: ICD-10-CM

## 2018-09-05 PROCEDURE — 76856 US EXAM PELVIC COMPLETE: CPT

## 2018-09-05 PROCEDURE — 77065 DX MAMMO INCL CAD UNI: CPT

## 2018-12-05 ENCOUNTER — OFFICE VISIT (OUTPATIENT)
Dept: FAMILY MEDICINE | Facility: OTHER | Age: 53
End: 2018-12-05
Attending: FAMILY MEDICINE
Payer: COMMERCIAL

## 2018-12-05 VITALS
HEIGHT: 67 IN | SYSTOLIC BLOOD PRESSURE: 130 MMHG | DIASTOLIC BLOOD PRESSURE: 74 MMHG | BODY MASS INDEX: 25.27 KG/M2 | WEIGHT: 161 LBS | TEMPERATURE: 98.6 F | HEART RATE: 64 BPM

## 2018-12-05 DIAGNOSIS — K13.0 ANGULAR CHEILITIS: ICD-10-CM

## 2018-12-05 DIAGNOSIS — G44.209 TENSION HEADACHE: ICD-10-CM

## 2018-12-05 DIAGNOSIS — K21.9 GASTROESOPHAGEAL REFLUX DISEASE WITHOUT ESOPHAGITIS: Primary | ICD-10-CM

## 2018-12-05 PROCEDURE — 99214 OFFICE O/P EST MOD 30 MIN: CPT | Performed by: FAMILY MEDICINE

## 2018-12-05 RX ORDER — CYCLOBENZAPRINE HCL 10 MG
10 TABLET ORAL
Qty: 42 TABLET | Refills: 3 | Status: SHIPPED | OUTPATIENT
Start: 2018-12-05 | End: 2020-02-27

## 2018-12-05 ASSESSMENT — ENCOUNTER SYMPTOMS
ROS GI COMMENTS: SEE HPI
CARDIOVASCULAR NEGATIVE: 1

## 2018-12-05 NOTE — MR AVS SNAPSHOT
After Visit Summary   12/5/2018    Rakel Mckay    MRN: [id number]           Patient Information     Date Of Birth          1965        Visit Information        Provider Department      12/5/2018 12:15 PM Evelia Cortez MD [facility] [place] [facility]        Today's Diagnoses     Tension headache    -  1    Impetigo          Care Instructions      Tips to Control Acid Reflux    To control acid reflux, you ll need to make some basic diet and lifestyle changes. The simple steps outlined below may be all you ll need to ease discomfort.  Watch what you eat    Avoid fatty foods and spicy foods.    Eat fewer acidic foods, such as citrus and tomato-based foods. These can increase symptoms.    Limit drinking alcohol, caffeine, and fizzy beverages. All increase acid reflux.    Try limiting chocolate, peppermint, and spearmint. These can worsen acid reflux in some people.  Watch when you eat    Avoid lying down for 3 hours after eating.    Do not snack before going to bed.  Raise your head  Raising your head and upper body by 4 to 6 inches helps limit reflux when you re lying down. Put blocks under the head of your bed frame to raise it.  Other changes    Lose weight, if you need to    Don t exercise near bedtime    Avoid tight-fitting clothes    Limit aspirin and ibuprofen    Stop smoking   Date Last Reviewed: 7/1/2016 2000-2018 [employer]. [address] [place], [place] [id number]. All rights reserved. This information is not intended as a substitute for professional medical care. Always follow your healthcare professional's instructions.        Tips to Control Acid Reflux    To control acid reflux, you ll need to make some basic diet and lifestyle changes. The simple steps outlined below may be all you ll need to ease discomfort.  Watch what you eat    Avoid fatty foods and spicy foods.    Eat fewer acidic foods, such as citrus and tomato-based foods. These can increase  symptoms.    Limit drinking alcohol, caffeine, and fizzy beverages. All increase acid reflux.    Try limiting chocolate, peppermint, and spearmint. These can worsen acid reflux in some people.  Watch when you eat    Avoid lying down for 3 hours after eating.    Do not snack before going to bed.  Raise your head  Raising your head and upper body by 4 to 6 inches helps limit reflux when you re lying down. Put blocks under the head of your bed frame to raise it.  Other changes    Lose weight, if you need to    Don t exercise near bedtime    Avoid tight-fitting clothes    Limit aspirin and ibuprofen    Stop smoking   Date Last Reviewed: 7/1/2016 2000-2018 [employer]. [address] [place] [address], [place] [address]. All rights reserved. This information is not intended as a substitute for professional medical care. Always follow your healthcare professional's instructions.                Follow-ups after your visit        Who to contact     If you have questions or need follow up information about today's clinic visit or your schedule please contact [facility] directly at [phone].  Normal or non-critical lab and imaging results will be communicated to you by ApiFixhart, letter or phone within 4 business days after the clinic has received the results. If you do not hear from us within 7 days, please contact the clinic through Philo or phone. If you have a critical or abnormal lab result, we will notify you by phone as soon as possible.  Submit refill requests through Philo or call your pharmacy and they will forward the refill request to us. Please allow 3 business days for your refill to be completed.          Additional Information About Your Visit        Philo Information     Philo gives you secure access to your electronic health record. If you see a primary care provider, you can also send messages to your care team and make appointments. If you have questions, please call  "your primary care clinic.  If you do not have a primary care provider, please call [phone] and they will assist you.        Care EveryWhere ID     This is your Care EveryWhere ID. This could be used by other organizations to access your [place] medical records  [id number]        Your Vitals Were     Pulse Temperature Height Breastfeeding? BMI (Body Mass Index)       64 98.6  F (37  C) 5' 7\" (1.702 m) No 25.22 kg/m2        Blood Pressure from Last 3 Encounters:   12/05/18 130/74   03/05/18 102/80   02/20/18 110/70    Weight from Last 3 Encounters:   12/05/18 161 lb (73 kg)   03/05/18 159 lb 14.4 oz (72.5 kg)   01/24/18 157 lb 12.8 oz (71.6 kg)              Today, you had the following     No orders found for display       Primary Care Provider Fax #    Physician No Ref-Primary [phone]       No address on file        Equal Access to Services     ABBY ANAYA : Hadii aad ku hadasho Soomaali, waaxda luqadaha, qaybta kaalmada adeegyada, waxay baldomero allred . So [facility] [phone].    ATENCIÓN: Si habla español, tiene a dunn disposición servicios gratuitos de asistencia lingüística. Llame al [phone].    We comply with applicable federal civil rights laws and [place] laws. We do not discriminate on the basis of race, color, national origin, age, disability, sex, sexual orientation, or gender identity.            Thank you!     Thank you for choosing [facility]  for your care. Our goal is always to provide you with excellent care. Hearing back from our patients is one way we can continue to improve our services. Please take a few minutes to complete the written survey that you may receive in the mail after your visit with us. Thank you!             Your Updated Medication List - Protect others around you: Learn how to safely use, store and throw away your medicines at www.disposemymeds.org.          This list is accurate as of 12/5/18 12:26 PM.  Always use your " most recent med list.                   Brand Name Dispense Instructions for use Diagnosis    cyclobenzaprine 10 MG tablet    FLEXERIL     Take 1 tablet by mouth nightly as needed for muscle spasms

## 2018-12-05 NOTE — PROGRESS NOTES
"Nursing Notes:   Tigist Aguilar LPN  12/5/2018 12:14 PM  Signed  Patient presents to clinic with concerns about acid reflux, she is waking up during the night \"gagging\". She also has dryness in corners of mouth with tingling that comes and goes on lips.  Tigist Sharpe ....................  12/5/2018   12:08 PM      SUBJECTIVE:   Rakel Mckay is a 53 year old female who presents to clinic today for the following health issues:  Her previous PCP has left the clinic and needs new provider for refills.   1). Has a sensation of \"something in her throat\" and then a few nights ago awoke with sensation of bile reflux. Has not tried any over the counter medications or other changes.  She does often snack or eat after supper and close to bedtime.  She does not consume large amounts of caffeinated beverages or sodas.  Discussed management of GERD.  2).  Continued issues with cracking and fissuring at the corners of her mouth and given Bactroban when first discussed this earlier this year.  It seemed to be getting better but now has worsened with some fissuring, redness and flakiness and a burning sensation on the inner lower lip.  She generally uses Chapstick brand for her lips and this is discouraged and we discussed more natural products that she could use and given some suggestions.    HPI    Patient Active Problem List   Diagnosis     Diffuse cystic mastopathy     Foreign body in foot, right     Human papillomavirus in conditions classified elsewhere and of unspecified site     Menorrhagia     H/O foot surgery     Past Surgical History:   Procedure Laterality Date     COLONOSCOPY      11/13/2014,Normal 10 year follow up     OTHER SURGICAL HISTORY      [id number],OTHER,operated in infancy     OTHER SURGICAL HISTORY      1997,[id number],OTHER,in [place], [place]     OTHER SURGICAL HISTORY      12/08/2016,[id number],OTHER,Right     TONSILLECTOMY      No Comments Provided     TYMPANOSTOMY, LOCAL/TOPICAL ANESTHESIA      Bilateral TM " "perforations and tympanostomies in childhood       Social History   Substance Use Topics     Smoking status: Never Smoker     Smokeless tobacco: Never Used     Alcohol use No     Family History   Problem Relation Age of Onset     Heart Disease Mother      Heart Disease,MI at age 40 with subsequent CHF, lung cancer, and chronic obstructive pulmonary disease, doing well, TIAs     Cancer Mother      Cancer, lung cancer     Other - See Comments Mother      COPD/Stroke,TIAs     Other - See Comments Father      hypothyroid     Prostate Cancer Paternal Grandfather      Cancer-prostate     Breast Cancer No family hx of      Cancer-breast         Current Outpatient Prescriptions   Medication Sig Dispense Refill     cyclobenzaprine (FLEXERIL) 10 MG tablet Take 1 tablet (10 mg) by mouth nightly as needed for muscle spasms 42 tablet 3     No Known Allergies    Review of Systems   Cardiovascular: Negative.    Gastrointestinal:        See HPI        OBJECTIVE:     /74  Pulse 64  Temp 98.6  F (37  C)  Ht 5' 7\" (1.702 m)  Wt 161 lb (73 kg)  Breastfeeding? No  BMI 25.22 kg/m2  Body mass index is 25.22 kg/(m^2).  Physical Exam   Constitutional: She appears well-developed. No distress.   Skin:   Findings of lips and lip margin at the corners is consistent with angular colitis with fissuring.  The patient has fairly deep folds near her lips and moisture will adhere to the area.  No evidence of secondary cellulitis.   Nursing note and vitals reviewed.      Diagnostic Test Results:  none     ASSESSMENT/PLAN:       ICD-10-CM    1. Gastroesophageal reflux disease without esophagitis K21.9    2. Tension headache G44.209 cyclobenzaprine (FLEXERIL) 10 MG tablet   3. Angular cheilitis K13.0      Plan:  Patient is instructed to alter the topical she uses on her lips and given some options including Alma or Dr Teixeira brands. Randy for phone provided regarding less toxic products. Use bactorban combined with lamisil or lotrimin to " heal the corners of mouth.  GERD treatment discussed. AVS provided and reviewed. Add prilosec daily for 2-3 weeks then prn.  Requested refills of Flexeril which she uses intermittently for tension headaches and provided.  Evelia Cortez MD  12:46 PM 12/5/2018     I spent approximately 25 minutes with the patient (exclusive of separately billed services/procedures), with greater than 50% spent in Counseling,Prognosis, Risks and benefits of management or follow-up, Importance of compliance with chosen management options, Instructions of management (treatment) and/or follow-up, Risk factor reduction and Patient education andCoordinating Care, Establishing and/or reviewing the patient's medical record.      Portions of this dictation were created using the Dragon Nuance voice recognition system. Proofreading was completed but there may be errors in text.      Evelia Cortez MD  [facility]

## 2018-12-05 NOTE — PATIENT INSTRUCTIONS
Tips to Control Acid Reflux    To control acid reflux, you ll need to make some basic diet and lifestyle changes. The simple steps outlined below may be all you ll need to ease discomfort.  Watch what you eat    Avoid fatty foods and spicy foods.    Eat fewer acidic foods, such as citrus and tomato-based foods. These can increase symptoms.    Limit drinking alcohol, caffeine, and fizzy beverages. All increase acid reflux.    Try limiting chocolate, peppermint, and spearmint. These can worsen acid reflux in some people.  Watch when you eat    Avoid lying down for 3 hours after eating.    Do not snack before going to bed.  Raise your head  Raising your head and upper body by 4 to 6 inches helps limit reflux when you re lying down. Put blocks under the head of your bed frame to raise it.  Other changes    Lose weight, if you need to    Don t exercise near bedtime    Avoid tight-fitting clothes    Limit aspirin and ibuprofen    Stop smoking   Date Last Reviewed: 7/1/2016 2000-2018 [employer]. [address] [place] [address], [place] [id number]. All rights reserved. This information is not intended as a substitute for professional medical care. Always follow your healthcare professional's instructions.        Tips to Control Acid Reflux    To control acid reflux, you ll need to make some basic diet and lifestyle changes. The simple steps outlined below may be all you ll need to ease discomfort.  Watch what you eat    Avoid fatty foods and spicy foods.    Eat fewer acidic foods, such as citrus and tomato-based foods. These can increase symptoms.    Limit drinking alcohol, caffeine, and fizzy beverages. All increase acid reflux.    Try limiting chocolate, peppermint, and spearmint. These can worsen acid reflux in some people.  Watch when you eat    Avoid lying down for 3 hours after eating.    Do not snack before going to bed.  Raise your head  Raising your head and upper body by 4 to 6 inches helps limit reflux  when you re lying down. Put blocks under the head of your bed frame to raise it.  Other changes    Lose weight, if you need to    Don t exercise near bedtime    Avoid tight-fitting clothes    Limit aspirin and ibuprofen    Stop smoking   Date Last Reviewed: 7/1/2016 2000-2018 [employer]. [address] [place] [id number]. All rights reserved. This information is not intended as a substitute for professional medical care. Always follow your healthcare professional's instructions.

## 2018-12-05 NOTE — NURSING NOTE
"Patient presents to clinic with concerns about acid reflux, she is waking up during the night \"gagging\". She also has dryness in corners of mouth with tingling that comes and goes on lips.  Tigist Sharpe ....................  12/5/2018   12:08 PM      "

## 2019-03-07 ENCOUNTER — HOSPITAL ENCOUNTER (OUTPATIENT)
Dept: MAMMOGRAPHY | Facility: OTHER | Age: 54
Discharge: HOME | End: 2019-03-07
Attending: FAMILY MEDICINE | Admitting: FAMILY MEDICINE
Payer: COMMERCIAL

## 2019-03-07 DIAGNOSIS — Z98.890 H/O BENIGN BREAST BIOPSY: ICD-10-CM

## 2019-03-07 DIAGNOSIS — Z09 FOLLOW-UP EXAM, 3-6 MONTHS SINCE PREVIOUS EXAM: ICD-10-CM

## 2019-03-07 PROCEDURE — 77066 DX MAMMO INCL CAD BI: CPT

## 2019-03-07 PROCEDURE — G0279 TOMOSYNTHESIS, MAMMO: HCPCS

## 2019-05-14 ENCOUNTER — OFFICE VISIT (OUTPATIENT)
Dept: FAMILY MEDICINE | Facility: OTHER | Age: 54
End: 2019-05-14
Attending: FAMILY MEDICINE
Payer: COMMERCIAL

## 2019-05-14 VITALS
DIASTOLIC BLOOD PRESSURE: 68 MMHG | WEIGHT: 161 LBS | HEART RATE: 58 BPM | RESPIRATION RATE: 12 BRPM | HEIGHT: 67 IN | TEMPERATURE: 98.1 F | BODY MASS INDEX: 25.27 KG/M2 | SYSTOLIC BLOOD PRESSURE: 126 MMHG

## 2019-05-14 DIAGNOSIS — H93.11 TINNITUS, RIGHT: ICD-10-CM

## 2019-05-14 DIAGNOSIS — Z00.00 HEALTH MAINTENANCE EXAMINATION: Primary | ICD-10-CM

## 2019-05-14 PROBLEM — B97.7 HUMAN PAPILLOMAVIRUS IN CONDITIONS CLASSIFIED ELSEWHERE AND OF UNSPECIFIED SITE: Status: RESOLVED | Noted: 2018-02-05 | Resolved: 2019-05-14

## 2019-05-14 LAB
ALBUMIN SERPL-MCNC: 4.7 G/DL (ref 3.5–5.7)
ALP SERPL-CCNC: 95 U/L (ref 34–104)
ALT SERPL W P-5'-P-CCNC: 48 U/L (ref 7–52)
ANION GAP SERPL CALCULATED.3IONS-SCNC: 8 MMOL/L (ref 3–14)
AST SERPL W P-5'-P-CCNC: 32 U/L (ref 13–39)
BILIRUB SERPL-MCNC: 0.7 MG/DL (ref 0.3–1)
BUN SERPL-MCNC: 17 MG/DL (ref 7–25)
CALCIUM SERPL-MCNC: 9.9 MG/DL (ref 8.6–10.3)
CHLORIDE SERPL-SCNC: 107 MMOL/L (ref 98–107)
CO2 SERPL-SCNC: 26 MMOL/L (ref 21–31)
CREAT SERPL-MCNC: 0.72 MG/DL (ref 0.6–1.2)
ERYTHROCYTE [DISTWIDTH] IN BLOOD BY AUTOMATED COUNT: 12.5 % (ref 10–15)
GFR SERPL CREATININE-BSD FRML MDRD: 84 ML/MIN/{1.73_M2}
GLUCOSE SERPL-MCNC: 100 MG/DL (ref 70–105)
HCT VFR BLD AUTO: 44.3 % (ref 35–47)
HGB BLD-MCNC: 14.8 G/DL (ref 11.7–15.7)
MCH RBC QN AUTO: 29 PG (ref 26.5–33)
MCHC RBC AUTO-ENTMCNC: 33.4 G/DL (ref 31.5–36.5)
MCV RBC AUTO: 87 FL (ref 78–100)
PLATELET # BLD AUTO: 245 10E9/L (ref 150–450)
POTASSIUM SERPL-SCNC: 4.4 MMOL/L (ref 3.5–5.1)
PROT SERPL-MCNC: 7.3 G/DL (ref 6.4–8.9)
RBC # BLD AUTO: 5.11 10E12/L (ref 3.8–5.2)
SODIUM SERPL-SCNC: 141 MMOL/L (ref 134–144)
TSH SERPL DL<=0.05 MIU/L-ACNC: 2.95 IU/ML (ref 0.34–5.6)
WBC # BLD AUTO: 7.3 10E9/L (ref 4–11)

## 2019-05-14 PROCEDURE — 36415 COLL VENOUS BLD VENIPUNCTURE: CPT | Mod: ZL | Performed by: FAMILY MEDICINE

## 2019-05-14 PROCEDURE — 99396 PREV VISIT EST AGE 40-64: CPT | Performed by: FAMILY MEDICINE

## 2019-05-14 PROCEDURE — 84443 ASSAY THYROID STIM HORMONE: CPT | Mod: ZL | Performed by: FAMILY MEDICINE

## 2019-05-14 PROCEDURE — 85027 COMPLETE CBC AUTOMATED: CPT | Mod: ZL | Performed by: FAMILY MEDICINE

## 2019-05-14 PROCEDURE — 80053 COMPREHEN METABOLIC PANEL: CPT | Mod: ZL | Performed by: FAMILY MEDICINE

## 2019-05-14 ASSESSMENT — MIFFLIN-ST. JEOR: SCORE: 1362.92

## 2019-05-14 ASSESSMENT — PATIENT HEALTH QUESTIONNAIRE - PHQ9: SUM OF ALL RESPONSES TO PHQ QUESTIONS 1-9: 0

## 2019-05-14 NOTE — NURSING NOTE
Patient presents to clinic for health maintenance physical. Patient would like to have right ear checked.  Tigist Sharpe ....................  5/14/2019   9:57 AM

## 2019-05-14 NOTE — PATIENT INSTRUCTIONS
Patient Education     Prevention Guidelines, Women Ages 50 to 64  Screening tests and vaccines are an important part of managing your health. A screening test is done to find possible disorders or diseases in people who don't have any symptoms. The goal is to find a disease early so lifestyle changes can be made and you can be watched more closely to reduce the risk of disease, or to detect it early enough to treat it most effectively. Screening tests are not considered diagnostic, but are used to determine if more testing is needed. Health counseling is essential, too. Below are guidelines for these, for women ages 50 to 64. Talk with your healthcare provider to make sure you re up to date on what you need.  Screening Who needs it How often   Type 2 diabetes or prediabetes All women beginning at age 45 and women without symptoms at any age who are overweight or obese and have 1 or more additional risk factors for diabetes. At  least every 3 years   Type 2 diabetes or prediabetes All women diagnosed with gestational diabetes Lifelong testing every 3 years   Type 2 diabetes All women with prediabetes Every year   Alcohol misuse All women in this age group At routine exams   Blood pressure All women in this age group Yearly checkup if your blood pressure is normal  Normal blood pressure is less than 120/80 mm Hg  If your blood pressure reading is higher than normal, follow the advice of your healthcare provider   Breast cancer All women at average risk in this age group Yearly mammogram should be done until age 54. At age 55, switch to mammograms every other year or choose to continue yearly mammograms.  All women should be familiar with the potential benefits and risks of breast cancer screening with mammograms.      Cervical cancer All women in this age group, except women who have had a complete hysterectomy Pap test every 3 years or Pap test with human papillomavirus (HPV) test every 5 years   Chlamydia Women at  increased risk for infection At routine exams   Colorectal cancer All women in this age group Flexible sigmoidoscopy every 5 years, or colonoscopy every 10 years, or double-contrast barium enema every 5 years; yearly fecal occult blood test or fecal immunochemical test; or a stool DNA test as often as your health care provider advises; talk with your health care provider about which tests are best for you   Depression All women in this age group At routine exams   Gonorrhea Sexually active women at increased risk for infection At routine exams   Hepatitis C Anyone at increased risk; 1 time for those born between 1945 and 1965 At routine exams   High cholesterol or triglycerides All women in this age group who are at risk for coronary artery disease At least every 5 years   HIV All women At routine exams   Lung cancer Adults age 55 to 80 who have smoked Yearly screening in smokers with 30 pack-year history of smoking or who quit within 15 years   Obesity All women in this age group At routine exams   Osteoporosis Women who are postmenopausal Ask your healthcare provider   Syphilis Women at increased risk for infection   talk with your healthcare provider At routine exams   Tuberculosis Women at increased risk for infection   talk with your healthcare provider Ask your healthcare provider   Vision All women in this age group Ask your healthcare provider   Vaccine Who needs it How often   Chickenpox (varicella) All women in this age group who have no record of this infection or vaccine 2 doses; the second dose should be given at least 4 weeks after the first dose   Hepatitis A Women at increased risk for infection   talk with your healthcare provider 2 doses given at least 6 months apart   Hepatitis B Women at increased risk for infection   talk with your healthcare provider 3 doses over 6 months; second dose should be given 1 month after the first dose; the third dose should be given at least 2 months after the second  dose and at least 4 months after the first dose   Haemophilus influenzaeType B (HIB) Women at increased risk for infection   talk with your healthcare provider 1 to 3 doses   Influenza (flu) All women in this age group Once a year   Measles, mumps, rubella (MMR) Women in this age group through their late 50s who have no record of these infections or vaccines 1 dose   Meningococcal Women at increased risk for infection   talk with your healthcare provider 1 or more doses   Pneumococcal conjugate vaccine (PCV13) and pneumococcal polysaccharide vaccine (PPSV23) Women at increased risk for infection   talk with your healthcare provider PCV13: 1 dose ages 19 to 65 (protects against 13 types of pneumococcal bacteria)  PPSV23: 1 to 2 doses through age 64, or 1 dose at 65 or older (protects against 23 types of pneumococcal bacteria)   Tetanus/diphtheria/pertussis (Td/Tdap) booster All women in this age group Td every 10 years, or a one-time dose of Tdap instead of a Td booster after age 18, then Td every 10 years   Zoster All women ages 60 and older 1 dose   Counseling Who needs it How often   BRCA gene mutation testing for breast and ovarian cancer susceptibility Women with increased risk for having gene mutation When your risk is known   Breast cancer and chemoprevention Women at high risk for breast cancer When your risk is known   Diet and exercise Women who are overweight or obese When diagnosed, and then at routine exams   Sexually transmitted infection prevention Women at increased risk for infection   talk with your healthcare provider At routine exams   Use of daily aspirin Women ages 55 and up in this age group who are at risk for cardiovascular health problems such as stroke When your risk is known   Use of tobacco and the health effects it can cause All women in this age group Every exam   1American [employer]  Date Last Reviewed: 1/26/2016 2000-2018 [employer]. [address],  [place] [address]. All rights reserved. This information is not intended as a substitute for professional medical care. Always follow your healthcare professional's instructions.

## 2019-05-14 NOTE — PROGRESS NOTES
GYNECOLOGY OFFICE NOTE    SUBJECTIVE:  Rakel Mckay  is a 53 year old female  female here today for annual physical.   Having some issues with tinnitus and right ear worse. Lots of ear issues in childhood and some sugeries,.   Big year with only child graduating from  and going away for college.  LMP: No LMP recorded. Patient is menopausal with LMP >1 year ago.  PAP up to date.         ALLERGIES:  Patient has no known allergies.      Past Medical History:   Diagnosis Date     Diffuse cystic mastopathy of breast     No Comments Provided     Encounter for screening for malignant neoplasm of colon     2014     Female infertility     Secondary to Chlamydia and pelvic infections     Other chest pain     No Comments Provided     Other specified postprocedural states     2016     Personal history of other mental and behavioral disorders     with fatigue     Superficial foreign body of right foot     2016     Past Surgical History:   Procedure Laterality Date     COLONOSCOPY  2014    Normal 10 year follow up     Pyloric stenosis      Operated in infancy     Removal of foreign body from foot Right 2016     Salpingostomies       [place]     TONSILLECTOMY       TYMPANOSTOMY, LOCAL/TOPICAL ANESTHESIA      Bilateral TM perforations and tympanostomies in childhood     Patient Active Problem List   Diagnosis     Diffuse cystic mastopathy     Foreign body in foot, right     H/O foot surgery     Angular cheilitis     Tinnitus, right     Social History     Social History Narrative    , employed at [employer] in [place].      Drew Spouse, employed at [employer]    Daughter, born , Barbara. [employer] - and plans to attend [employer].             REVIEW OF SYSTEMS:    normal menses, no abnormal bleeding, pelvic pain or discharge, no breast pain or new or enlarging lumps on self exam      PHYSICAL EXAM:   Vitals: /68   Pulse 58   Temp 98.1  F (36.7  C)   Resp 12    "Ht 1.702 m (5' 7\")   Wt 73 kg (161 lb)   Breastfeeding? No   BMI 25.22 kg/m    BMI= Body mass index is 25.22 kg/m .      General Appearance: Pleasant, alert, appropriate appearance for age. No acute distress  Head Exam: Normal. Normocephalic, atraumatic.  Neck Exam:Supple, no masses or nodes.  Thyroid Exam: No nodules or enlargement, mobile.  Chest/Respiratory Exam: Normal chest wall and respirations. Clear to auscultation.  Breast Exam: No dimpling, nipple retraction ordischarge. No masses or nodes.  Cardiovascular Exam: Regular rate and rhythm. S1, S2, no murmur.  Lymphatic Exam: Non-palpablenodes in neck, clavicular, axillary, or inguinal regions.  Musculoskeletal Exam: Back is straight and non-tender  Skin: no rash or abnormalities  Psychiatric Exam: Alert and oriented - appropriate affect.      ASSESSMENT/PLAN:  1. Health maintenance examination    2. Tinnitus, right      Plan:  Labs done and lipid not repeated today as low cardiac risk.  Tinnitus discussed.   Mammogram up to date.   Follow up annually. Next PAP 2023.  Evelia Cortez MD  3:30 PM 5/14/2019   Portions of this dictation were created using the Dragon Nuance voice recognition system. Proofreading was completed but there may be errors in text.        "

## 2019-10-09 ENCOUNTER — TELEPHONE (OUTPATIENT)
Dept: INTERNAL MEDICINE | Facility: OTHER | Age: 54
End: 2019-10-09

## 2019-10-09 NOTE — TELEPHONE ENCOUNTER
Patient would like a call back about a medication question question if she should continue or not if you could please call her back thank you   Patrica Bolanos on 10/9/2019 at 9:48 AM

## 2019-10-09 NOTE — TELEPHONE ENCOUNTER
Wrong chart. This encounter will now be closed.     Cassandra Cobos LPN............. October 9, 2019 11:18 AM

## 2019-10-31 ENCOUNTER — OFFICE VISIT (OUTPATIENT)
Dept: FAMILY MEDICINE | Facility: OTHER | Age: 54
End: 2019-10-31
Attending: FAMILY MEDICINE
Payer: COMMERCIAL

## 2019-10-31 VITALS
SYSTOLIC BLOOD PRESSURE: 127 MMHG | HEART RATE: 70 BPM | TEMPERATURE: 98.4 F | DIASTOLIC BLOOD PRESSURE: 75 MMHG | OXYGEN SATURATION: 98 % | WEIGHT: 166.2 LBS | RESPIRATION RATE: 18 BRPM | BODY MASS INDEX: 26.03 KG/M2

## 2019-10-31 DIAGNOSIS — N64.4 BREAST PAIN, LEFT: ICD-10-CM

## 2019-10-31 DIAGNOSIS — R07.89 ATYPICAL CHEST PAIN: Primary | ICD-10-CM

## 2019-10-31 PROCEDURE — 93000 ELECTROCARDIOGRAM COMPLETE: CPT | Performed by: INTERNAL MEDICINE

## 2019-10-31 PROCEDURE — 99214 OFFICE O/P EST MOD 30 MIN: CPT | Performed by: FAMILY MEDICINE

## 2019-10-31 RX ORDER — EVE PRIMROSE/LINOLEIC/G-LENIC 1000 MG
2 CAPSULE ORAL
COMMUNITY
End: 2019-11-25

## 2019-10-31 RX ORDER — CALCIUM CARBONATE 260MG(650)
TABLET,CHEWABLE ORAL
COMMUNITY
End: 2019-11-25

## 2019-10-31 ASSESSMENT — PAIN SCALES - GENERAL: PAINLEVEL: MILD PAIN (2)

## 2019-10-31 NOTE — NURSING NOTE
Patient is here for concerns of left breast pain, states feels warm. Also [place] and had terrible chest pain, lasted 6 hours, had shoulder pain, moved into upper abdomen, had pain when sitting- standing helped, had SOB. No sweating, or dizziness.   Chacha Smith LPN .............10/31/2019     3:07 PM      No LMP recorded (lmp unknown). Patient is premenopausal.  Medication Reconciliation: complete    Chacha Smith LPN  10/31/2019 3:14 PM

## 2019-10-31 NOTE — PROGRESS NOTES
SUBJECTIVE:   Rakel Mckay is a 54 year old female who presents to clinic today for the following health issues:  Nursing Notes:   Chacha Smith LPN  10/31/2019  3:14 PM  Sign at exiting of workspace  Patient is here for concerns of left breast pain, states feels warm. Also states [place] and had terrible chest pain, lasted 6 hours, had shoulder pain, moved into upper abdomen, had pain when sitting- standing helped, had SOB. No sweating, or dizziness.   Chacha Smith LPN .............10/31/2019     3:07 PM      No LMP recorded (lmp unknown). Patient is premenopausal.  Medication Reconciliation: elyssa Smith LPN  10/31/2019 3:14 PM    HPI  53 yo female presents with left breast pain. Previous biopsy, mammogram in 3/2019 was normal.    Was driving in [place], had substernal/upper abdomen pain, worse with sitting. Cramping pain. Felt better to stand. Took some TUMs. Pain lasted 6 hours.  Patient has occasional heartburn.  Denies any regurgitation, nausea, vomiting or change in bowel movements.    Nonsmoker. EKG in 2014 was normal. , HDL 55  No recurrent pain since episode described above.  She reports exercise tolerance is normal although admits to being quite sedentary.  Patient Active Problem List    Diagnosis Date Noted     Tinnitus, right 05/14/2019     Priority: Medium     Angular cheilitis 12/05/2018     Priority: Medium     H/O foot surgery 12/08/2016     Priority: Medium     Foreign body in foot, right 11/29/2016     Priority: Medium     Diffuse cystic mastopathy 06/15/2010     Priority: Medium     Past Medical History:   Diagnosis Date     Diffuse cystic mastopathy of breast     No Comments Provided     Encounter for screening for malignant neoplasm of colon     11/11/2014     Female infertility     Secondary to Chlamydia and pelvic infections     Other chest pain     No Comments Provided     Other specified postprocedural states     12/8/2016      Personal history of other mental and behavioral disorders     with fatigue     Superficial foreign body of right foot     11/29/2016      Current Outpatient Medications   Medication Sig Dispense Refill     aspirin (ASA) 325 MG EC tablet Take 325 mg by mouth daily       B Complex-C (SUPER B COMPLEX/VITAMIN C PO)        cholecalciferol (VITAMIN D3) 5000 units (125 mcg) capsule Take 10,000 Units by mouth daily       Evening Primrose Oil 1000 MG CAPS Take 2 capsules by mouth       Magnesium Citrate 100 MG TABS        cyclobenzaprine (FLEXERIL) 10 MG tablet Take 1 tablet (10 mg) by mouth nightly as needed for muscle spasms (Patient not taking: Reported on 10/31/2019) 42 tablet 3       Review of Systems   Constitutional: Negative for fatigue and fever.   Respiratory: Negative for cough, choking, chest tightness, shortness of breath and stridor.    Cardiovascular: Positive for chest pain. Negative for palpitations and peripheral edema.   Gastrointestinal: Positive for heartburn. Negative for abdominal pain, constipation, nausea and vomiting.   Genitourinary: Negative for flank pain.   Musculoskeletal: Negative for back pain.   Allergic/Immunologic: Negative for environmental allergies and food allergies.   Neurological: Negative for dizziness.        OBJECTIVE:     /75 (BP Location: Right arm, Patient Position: Sitting, Cuff Size: Adult Regular)   Pulse 70   Temp 98.4  F (36.9  C) (Tympanic)   Resp 18   Wt 75.4 kg (166 lb 3.2 oz)   LMP  (LMP Unknown)   SpO2 98%   Breastfeeding? No   BMI 26.03 kg/m    Body mass index is 26.03 kg/m .  Physical Exam  Cardiovascular:      Rate and Rhythm: Normal rate and regular rhythm.      Pulses: Normal pulses.      Heart sounds: No murmur.   Pulmonary:      Effort: Pulmonary effort is normal. No respiratory distress.      Breath sounds: Normal breath sounds.   Chest:          Comments: Fibrocystic changes left upper/outer quadrant, no masses  Abdominal:      General: Abdomen  is flat. Bowel sounds are normal. There is no distension.      Tenderness: There is no tenderness.   Neurological:      Mental Status: She is alert.         Diagnostic Test Results:  EKG NSR no St-T changes    Chest xray pending    ASSESSMENT/PLAN:           ICD-10-CM    1. Atypical chest pain R07.89 EKG 12-lead complete w/read - Clinics     XR Chest 2 Views   2. Breast pain, left N64.4 MA Diagnostic Left w/Kartik     CANCELED: MA Diagnostic Digital Left     Doubt her symptoms are cardiac in origin and more likely esophageal spasm.  Recommend Prilosec 20 mg daily for the next 4 weeks.  Patient was reassured by normal EKG.    Given ongoing breast tenderness and fibrocystic changes in the left upper outer quadrant will proceed with diagnostic mammogram and ultrasound.  Patient also requested a chest x-ray which I think is reasonable.  We discussed the use of coronary calcium scan and she will think about this.  Patient Instructions   Start prilosec 20 mg daily    Schedule mammogram and chest xray    Consider calcium coronary scan        Vanessa Pinto MD  [facility]

## 2019-10-31 NOTE — PATIENT INSTRUCTIONS
Start prilosec 20 mg daily    Schedule mammogram and chest xray    Consider calcium coronary scan

## 2019-11-05 ASSESSMENT — ENCOUNTER SYMPTOMS
CHEST TIGHTNESS: 0
CHOKING: 0
COUGH: 0
PALPITATIONS: 0
BACK PAIN: 0
NAUSEA: 0
STRIDOR: 0
HEARTBURN: 1
VOMITING: 0
SHORTNESS OF BREATH: 0
DIZZINESS: 0
CONSTIPATION: 0
FEVER: 0
FLANK PAIN: 0
FATIGUE: 0
ABDOMINAL PAIN: 0

## 2019-11-06 ENCOUNTER — HOSPITAL ENCOUNTER (OUTPATIENT)
Dept: GENERAL RADIOLOGY | Facility: OTHER | Age: 54
End: 2019-11-06
Attending: FAMILY MEDICINE
Payer: COMMERCIAL

## 2019-11-06 ENCOUNTER — HOSPITAL ENCOUNTER (OUTPATIENT)
Dept: MAMMOGRAPHY | Facility: OTHER | Age: 54
End: 2019-11-06
Attending: FAMILY MEDICINE
Payer: COMMERCIAL

## 2019-11-06 ENCOUNTER — HOSPITAL ENCOUNTER (OUTPATIENT)
Dept: ULTRASOUND IMAGING | Facility: OTHER | Age: 54
Discharge: HOME | End: 2019-11-06
Attending: FAMILY MEDICINE | Admitting: FAMILY MEDICINE
Payer: COMMERCIAL

## 2019-11-06 DIAGNOSIS — N64.4 BREAST PAIN, LEFT: ICD-10-CM

## 2019-11-06 DIAGNOSIS — R07.89 ATYPICAL CHEST PAIN: ICD-10-CM

## 2019-11-06 PROCEDURE — G0279 TOMOSYNTHESIS, MAMMO: HCPCS

## 2019-11-06 PROCEDURE — 76642 ULTRASOUND BREAST LIMITED: CPT | Mod: LT

## 2019-11-06 PROCEDURE — 71046 X-RAY EXAM CHEST 2 VIEWS: CPT

## 2019-11-20 DIAGNOSIS — H93.19 TINNITUS, UNSPECIFIED LATERALITY: Primary | ICD-10-CM

## 2019-11-22 NOTE — PROGRESS NOTES
Otolaryngology Consultation    Patient: Rakel Mckay  : 1965    Patient presents with:  Consult: Tinnitus; Self Referral      HPI:  Rakel Mckay is a 54 year old female seen today for tinnitus  She noted an onset of tinnitus in 2018.  She was out on a motorcycle ride and noted it when she returned from the ride.    The tinnitus is always on the right, some days worse than others.    No fluctuating hearing  No rotary vertigo, she has had some disequilibrium with sitting to standing, very minimal, lasts a few seconds  No current otalgia, no otorrhea.  She does have some occasional right ear otalgia  The right ear pain is intermittent and is sharp in nature.    She denies clenching or grinding  She does have a history of right jaw pain, she was diagnosed with TMJ by Dr Duong in 3/18  + history of migraines, she was told by her PCP that they are muscle tension headaches, she treats them with flexeril.  Migraines are about every 2 months on average, last 1-2 days.  Has also had occular migraine in the past x 1.  No recent head or ear trauma.      She has a previous Audiogram in [place]  + history of COM as a child  + right ear surgery, right ossicle repair as teenager, family history of hearing loss and otological surgery - mother  + noise exposure in work setting, she does wear hearing protection consistently      Audiogram today's date reveals normal thresholds with a high frequency mild to moderate mixed hearing loss in the right and mild sensorineural loss in the left 20 dB SRT right 10 dB left 100% discrimination.  there appears to be a slight ASNHL on right   Large B tymp R, Ad left        Current Outpatient Rx   Medication Sig Dispense Refill     cholecalciferol (VITAMIN D3) 5000 units (125 mcg) capsule Take 10,000 Units by mouth daily       cyclobenzaprine (FLEXERIL) 10 MG tablet Take 1 tablet (10 mg) by mouth nightly as needed for muscle spasms (Patient not taking: Reported on 10/31/2019)  "42 tablet 3       Allergies: Patient has no known allergies.     Past Medical History:   Diagnosis Date     Diffuse cystic mastopathy of breast     No Comments Provided     Encounter for screening for malignant neoplasm of colon     11/11/2014     Female infertility     Secondary to Chlamydia and pelvic infections     Other chest pain     No Comments Provided     Other specified postprocedural states     12/8/2016     Personal history of other mental and behavioral disorders     with fatigue     Superficial foreign body of right foot     11/29/2016       Past Surgical History:   Procedure Laterality Date     COLONOSCOPY  11/13/2014    Normal 10 year follow up     Pyloric stenosis      Operated in infancy     Removal of foreign body from foot Right 12/08/2016     Salpingostomies  1997     [place], [place]     TONSILLECTOMY       TYMPANOSTOMY, LOCAL/TOPICAL ANESTHESIA      Bilateral TM perforations and tympanostomies in childhood       ENT family history reviewed    Social History     Tobacco Use     Smoking status: Never Smoker     Smokeless tobacco: Never Used   Substance Use Topics     Alcohol use: Not Currently     Alcohol/week: 0.0 standard drinks     Comment: rare     Drug use: Never       Review of Systems  ROS: 10 point ROS neg other than the symptoms noted above in the HPI and eye floaters headaches breast tenderness dry skin    Physical Exam  /80   Pulse 70   Temp 96.9  F (36.1  C) (Tympanic)   Ht 1.702 m (5' 7\")   Wt 75.3 kg (166 lb)   LMP  (LMP Unknown)   SpO2 98%   BMI 26.00 kg/m    General - The patient is well nourished and well developed, and appears to have good nutritional status.  Alert and oriented to person and place, answers questions and cooperates with examination appropriately.   Head and Face - Normocephalic and atraumatic, with no gross asymmetry noted.  The facial nerve is intact, with strong symmetric movements.  Voice and Breathing - The patient was breathing comfortably without " the use of accessory muscles. There was no wheezing, stridor, or stertor.  The patients voice was clear and strong, and had appropriate pitch and quality.  No neo peripheral digital clubbing or cyanosis   Ears -examined under microscopy bilaterally  The external auditory canals are patent, the left tympanic membrane is intact without effusion, retraction or mass.  Bony landmarks are intact.  Right TM with posterior microperforation, pinpoint, no otorrhea no retraction.  I cannot identify an obvious metal prosthesis and no prosthesis extrusion no retractions, no effusion  Eyes - Extraocular movements intact, and the pupils were reactive to light.  Sclera were not icteric or injected, conjunctiva were pink and moist.  Mouth - Examination of the oral cavity showed pink, healthy oral mucosa. No lesions or ulcerations noted.  The tongue was mobile and midline, and the dentition were in good condition.    Throat - The walls of the oropharynx were smooth, pink, moist, symmetric, and had no lesions or ulcerations.  The tonsillar pillars and soft palate were symmetric.  The uvula was midline on elevation.    Neck - No palpable enlarged fixed cervical lymph nodes.  No neck cysts or unusual tenderness to palpation.   No palpable fixed thyroid nodules or concerning goiter.  The trachea is grossly midline.   Nose - External contour is symmetric, no gross deflection or scars.  Nasal mucosa is pink and moist with no abnormal mucus.   No polyps, masses, or purulence noted on examination.      Impression and Plan- Raekl Mckay is a 54 year old female with:    ICD-10-CM    1. Tinnitus, right H93.11    2. 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 Mixed conductive and sensorineural hearing loss of right ear with restricted hearing of left ear H90.A31    4. History of ear surgery Z98.890     described stapedectomy 16 ramón Metcalf     Hearing Preservation    Complete Annual Audiogram      Tinnitus education was  provided.  Tinnitus is widely considered a disorder of cental auditory processing.    Hearing preservation was reinforced  I also cautioned the patient against investing in any oral supplements advertised to cure tinnitus.   The patient will follow up as necessary for worsening symptoms or changes in symptoms.   I have also recommended yearly audiograms, and consideration of a hearing aid evaluation if not already performed.    She does have a microperforation on the right side she is careful not to get water in her right ear but has not had any otorrhea.  Repair of this microperforation is unlikely to alter her hearing  She may have a slight asymmetrical sensorineural loss in the right side we will see whether or not an MRI is safe with her distant prosthesis.  If not would recommend proceeding with an MRI obvious asymmetrical sensorineural hearing loss occurs.  We could consider a CAT scan but this is not a sensitive in detection of acoustic and she would then be exposed to radiation.  No focal neurologic concerns    Kathryn Patel D.O.  Otolaryngology/Head and Neck Surgery  Allergy

## 2019-11-25 ENCOUNTER — OFFICE VISIT (OUTPATIENT)
Dept: OTOLARYNGOLOGY | Facility: OTHER | Age: 54
End: 2019-11-25
Attending: OTOLARYNGOLOGY
Payer: COMMERCIAL

## 2019-11-25 ENCOUNTER — OFFICE VISIT (OUTPATIENT)
Dept: AUDIOLOGY | Facility: OTHER | Age: 54
End: 2019-11-25
Attending: AUDIOLOGIST
Payer: COMMERCIAL

## 2019-11-25 VITALS
HEIGHT: 67 IN | OXYGEN SATURATION: 98 % | DIASTOLIC BLOOD PRESSURE: 80 MMHG | SYSTOLIC BLOOD PRESSURE: 110 MMHG | HEART RATE: 70 BPM | BODY MASS INDEX: 26.06 KG/M2 | TEMPERATURE: 96.9 F | WEIGHT: 166 LBS

## 2019-11-25 DIAGNOSIS — H90.A31 MIXED CONDUCTIVE AND SENSORINEURAL HEARING LOSS OF RIGHT EAR WITH RESTRICTED HEARING OF LEFT EAR: ICD-10-CM

## 2019-11-25 DIAGNOSIS — Z98.890 HISTORY OF EAR SURGERY: ICD-10-CM

## 2019-11-25 DIAGNOSIS — H93.11 TINNITUS OF RIGHT EAR: ICD-10-CM

## 2019-11-25 DIAGNOSIS — H90.6 MIXED CONDUCTIVE AND SENSORINEURAL HEARING LOSS OF BOTH EARS: Primary | ICD-10-CM

## 2019-11-25 DIAGNOSIS — H72.91 PERFORATION OF RIGHT TYMPANIC MEMBRANE: ICD-10-CM

## 2019-11-25 DIAGNOSIS — H93.11 TINNITUS, RIGHT: Primary | ICD-10-CM

## 2019-11-25 PROCEDURE — 92567 TYMPANOMETRY: CPT | Performed by: AUDIOLOGIST

## 2019-11-25 PROCEDURE — 92504 EAR MICROSCOPY EXAMINATION: CPT | Performed by: OTOLARYNGOLOGY

## 2019-11-25 PROCEDURE — 92557 COMPREHENSIVE HEARING TEST: CPT | Performed by: AUDIOLOGIST

## 2019-11-25 PROCEDURE — 99203 OFFICE O/P NEW LOW 30 MIN: CPT | Mod: 25 | Performed by: OTOLARYNGOLOGY

## 2019-11-25 ASSESSMENT — MIFFLIN-ST. JEOR: SCORE: 1380.6

## 2019-11-25 ASSESSMENT — PAIN SCALES - GENERAL: PAINLEVEL: NO PAIN (0)

## 2019-11-25 NOTE — NURSING NOTE
"Chief Complaint   Patient presents with     Consult     Tinnitus; Self Referral       Initial /80   Pulse 70   Temp 96.9  F (36.1  C) (Tympanic)   Ht 1.702 m (5' 7\")   Wt 75.3 kg (166 lb)   LMP  (LMP Unknown)   SpO2 98%   BMI 26.00 kg/m   Estimated body mass index is 26 kg/m  as calculated from the following:    Height as of this encounter: 1.702 m (5' 7\").    Weight as of this encounter: 75.3 kg (166 lb).  Medication Reconciliation: complete  Ksenia Velasquez LPN  "

## 2019-11-25 NOTE — PROGRESS NOTES
Audiology Evaluation Completed. Please refer SCANNED AUDIOGRAM and/or TYMPANOGRAM for BACKGROUND, RESULTS, RECOMMENDATIONS.      Dottie BARNES, [facility]  Audiologist #[id number]

## 2019-11-25 NOTE — PATIENT INSTRUCTIONS
Thank you for allowing Dr. Patel and our ENT team to participate in your care.  If your medications are too expensive, please give the nurse a call.  We can possibly change this medication.  If you have a scheduling or an appointment question please contact our Health Unit Coordinator at their direct line [phone].   ALL nursing questions or concerns can be directed to your ENT nurse at: [phone] Camila Finn    Hearing Preservation    Complete Annual Audiogram

## 2020-02-25 DIAGNOSIS — G44.209 TENSION HEADACHE: ICD-10-CM

## 2020-02-25 NOTE — LETTER
February 27, 2020      Rakel SORIA Wass  [address] [place] [address]        Dear Rakel,       A refill of cyclobenzaprine 10 mg tablet has been requested by your pharmacy and it appears you are a former patient of Evelia Cortez MD.  As Dr. Cortez is no longer with the clinic, we ask that you please contact [facility] for assistance in scheduling a medication management appointment with another one of our providers.    The refill request for this medication can then be discussed and addressed accordingly with your new primary care physician.  Your health is very important to us.  Please call the clinic at [phone] to schedule your appointment.    At this time, the refill request has been sent to one of our provider for review/consideration.    Thank you for choosing [facility] and [facility] for your health care needs.    Sincerely,    Refill RN  [facility]

## 2020-02-27 NOTE — TELEPHONE ENCOUNTER
[facility] sent Rx request for the following:   cyclobenzaprine 10 mg tablet  Sig:  Take 1 tablet (10 mg) by mouth nightly as needed for muscle spasms    Last Prescription Date:   12/5/2018  Last Fill Qty/Refills:         42, R-3    Last Office Visit:              10/31/2019-last seen by Dr. Pinto  Future Office visit:           None    Routing refill request to provider for review/approval because:  Drug not on the [facility], P or  Health refill protocol or controlled substance    Will send appt reminder letter/establish with new provider letter, add message to Rx note and route to Dr. Pinto.     Tosha Boyd RN  ....................  2/27/2020   1:44 PM

## 2020-03-03 RX ORDER — CYCLOBENZAPRINE HCL 10 MG
10 TABLET ORAL
Qty: 42 TABLET | Refills: 3 | Status: SHIPPED | OUTPATIENT
Start: 2020-03-03 | End: 2021-03-12

## 2020-03-11 ENCOUNTER — HEALTH MAINTENANCE LETTER (OUTPATIENT)
Age: 55
End: 2020-03-11

## 2020-07-10 ENCOUNTER — HOSPITAL ENCOUNTER (OUTPATIENT)
Dept: MAMMOGRAPHY | Facility: OTHER | Age: 55
Discharge: HOME | End: 2020-07-10
Attending: FAMILY MEDICINE | Admitting: FAMILY MEDICINE
Payer: COMMERCIAL

## 2020-07-10 DIAGNOSIS — Z12.31 VISIT FOR SCREENING MAMMOGRAM: ICD-10-CM

## 2020-07-10 PROCEDURE — 77067 SCR MAMMO BI INCL CAD: CPT

## 2020-08-11 NOTE — LETTER
2019         RE: Rakel Mckay  [address]  [place] [address]        Dear Colleague,    Thank you for referring your patient, Rakel Mckay, to the [facility]. Please see a copy of my visit note below.    Otolaryngology Consultation    Patient: Rakel Mckay  : 1965    Patient presents with:  Consult: Tinnitus; Self Referral      HPI:  Rakel Mckay is a 54 year old female seen today for tinnitus  She noted an onset of tinnitus in 2018.  She was out on a motorcycle ride and noted it when she returned from the ride.    The tinnitus is always on the right, some days worse than others.    No fluctuating hearing  No rotary vertigo, she has had some disequilibrium with sitting to standing, very minimal, lasts a few seconds  No current otalgia, no otorrhea.  She does have some occasional right ear otalgia  The right ear pain is intermittent and is sharp in nature.    She denies clenching or grinding  She does have a history of right jaw pain, she was diagnosed with TMJ by Dr Duong in 3/18  + history of migraines, she was told by her PCP that they are muscle tension headaches, she treats them with flexeril.  Migraines are about every 2 months on average, last 1-2 days.  Has also had occular migraine in the past x 1.  No recent head or ear trauma.      She has a previous Audiogram in [place]  + history of COM as a child  + right ear surgery, right ossicle repair as teenager, family history of hearing loss and otological surgery - mother  + noise exposure in work setting, she does wear hearing protection consistently      Audiogram today's date reveals normal thresholds with a high frequency mild to moderate mixed hearing loss in the right and mild sensorineural loss in the left 20 dB SRT right 10 dB left 100% discrimination.  there appears to be a slight ASNHL on right   Large B tymp R, Ad left        Current Outpatient Rx   Medication Sig Dispense Refill  "    cholecalciferol (VITAMIN D3) 5000 units (125 mcg) capsule Take 10,000 Units by mouth daily       cyclobenzaprine (FLEXERIL) 10 MG tablet Take 1 tablet (10 mg) by mouth nightly as needed for muscle spasms (Patient not taking: Reported on 10/31/2019) 42 tablet 3       Allergies: Patient has no known allergies.     Past Medical History:   Diagnosis Date     Diffuse cystic mastopathy of breast     No Comments Provided     Encounter for screening for malignant neoplasm of colon     11/11/2014     Female infertility     Secondary to Chlamydia and pelvic infections     Other chest pain     No Comments Provided     Other specified postprocedural states     12/8/2016     Personal history of other mental and behavioral disorders     with fatigue     Superficial foreign body of right foot     11/29/2016       Past Surgical History:   Procedure Laterality Date     COLONOSCOPY  11/13/2014    Normal 10 year follow up     Pyloric stenosis      Operated in infancy     Removal of foreign body from foot Right 12/08/2016     Salpingostomies  1997     [place], [place]     TONSILLECTOMY       TYMPANOSTOMY, LOCAL/TOPICAL ANESTHESIA      Bilateral TM perforations and tympanostomies in childhood       ENT family history reviewed    Social History     Tobacco Use     Smoking status: Never Smoker     Smokeless tobacco: Never Used   Substance Use Topics     Alcohol use: Not Currently     Alcohol/week: 0.0 standard drinks     Comment: rare     Drug use: Never       Review of Systems  ROS: 10 point ROS neg other than the symptoms noted above in the HPI and eye floaters headaches breast tenderness dry skin    Physical Exam  /80   Pulse 70   Temp 96.9  F (36.1  C) (Tympanic)   Ht 1.702 m (5' 7\")   Wt 75.3 kg (166 lb)   LMP  (LMP Unknown)   SpO2 98%   BMI 26.00 kg/m     General - The patient is well nourished and well developed, and appears to have good nutritional status.  Alert and oriented to person and place, answers questions and " cooperates with examination appropriately.   Head and Face - Normocephalic and atraumatic, with no gross asymmetry noted.  The facial nerve is intact, with strong symmetric movements.  Voice and Breathing - The patient was breathing comfortably without the use of accessory muscles. There was no wheezing, stridor, or stertor.  The patients voice was clear and strong, and had appropriate pitch and quality.  No neo peripheral digital clubbing or cyanosis   Ears -examined under microscopy bilaterally  The external auditory canals are patent, the left tympanic membrane is intact without effusion, retraction or mass.  Bony landmarks are intact.  Right TM with posterior microperforation, pinpoint, no otorrhea no retraction.  I cannot identify an obvious metal prosthesis and no prosthesis extrusion no retractions, no effusion  Eyes - Extraocular movements intact, and the pupils were reactive to light.  Sclera were not icteric or injected, conjunctiva were pink and moist.  Mouth - Examination of the oral cavity showed pink, healthy oral mucosa. No lesions or ulcerations noted.  The tongue was mobile and midline, and the dentition were in good condition.    Throat - The walls of the oropharynx were smooth, pink, moist, symmetric, and had no lesions or ulcerations.  The tonsillar pillars and soft palate were symmetric.  The uvula was midline on elevation.    Neck - No palpable enlarged fixed cervical lymph nodes.  No neck cysts or unusual tenderness to palpation.   No palpable fixed thyroid nodules or concerning goiter.  The trachea is grossly midline.   Nose - External contour is symmetric, no gross deflection or scars.  Nasal mucosa is pink and moist with no abnormal mucus.   No polyps, masses, or purulence noted on examination.      Impression and Plan- Rakeltamiko Mckay is a 54 year old female with:    ICD-10-CM    1. Tinnitus, right H93.11    2. 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 Mixed conductive and  sensorineural hearing loss of right ear with restricted hearing of left ear H90.A31    4. History of ear surgery Z98.890     described stapedectomy 16 ramón Metcalf     Hearing Preservation    Complete Annual Audiogram      Tinnitus education was provided.  Tinnitus is widely considered a disorder of cental auditory processing.    Hearing preservation was reinforced  I also cautioned the patient against investing in any oral supplements advertised to cure tinnitus.   The patient will follow up as necessary for worsening symptoms or changes in symptoms.   I have also recommended yearly audiograms, and consideration of a hearing aid evaluation if not already performed.    She does have a microperforation on the right side she is careful not to get water in her right ear but has not had any otorrhea.  Repair of this microperforation is unlikely to alter her hearing  She may have a slight asymmetrical sensorineural loss in the right side we will see whether or not an MRI is safe with her distant prosthesis.  If not would recommend proceeding with an MRI obvious asymmetrical sensorineural hearing loss occurs.  We could consider a CAT scan but this is not a sensitive in detection of acoustic and she would then be exposed to radiation.  No focal neurologic concerns    Kathryn Patel D.O.  Otolaryngology/Head and Neck Surgery  Allergy    Again, thank you for allowing me to participate in the care of your patient.        Sincerely,        Kathryn Patel MD     No

## 2020-09-15 ENCOUNTER — OFFICE VISIT (OUTPATIENT)
Dept: OTOLARYNGOLOGY | Facility: OTHER | Age: 55
End: 2020-09-15
Attending: OTOLARYNGOLOGY
Payer: COMMERCIAL

## 2020-09-15 DIAGNOSIS — H93.11 TINNITUS OF RIGHT EAR: ICD-10-CM

## 2020-09-15 DIAGNOSIS — H90.A31 MIXED CONDUCTIVE AND SENSORINEURAL HEARING LOSS, UNILATERAL, RIGHT EAR WITH RESTRICTED HEARING ON THE CONTRALATERAL SIDE: ICD-10-CM

## 2020-09-15 DIAGNOSIS — H90.A22 SENSORINEURAL HEARING LOSS, UNILATERAL, LEFT EAR, WITH RESTRICTED HEARING ON THE CONTRALATERAL SIDE: ICD-10-CM

## 2020-09-15 DIAGNOSIS — H72.91 PERFORATION OF TYMPANIC MEMBRANE, RIGHT: Primary | ICD-10-CM

## 2020-09-15 PROCEDURE — G0463 HOSPITAL OUTPT CLINIC VISIT: HCPCS

## 2020-09-24 NOTE — PROGRESS NOTES
document embedded image  Patient Name: Rakel Mckay    Address: [address]     YOB: 1965    [place] [address]    MR Number: [id number]    Phone: [phone]  PCP: Rosa Isela Cardozo MD            Appointment Date: 09/15/20   Visit Provider: Jacob Duong MD    cc: Rosa Isela Cardozo MD; ~    ENT Progress Note    Visit Reasons: Ringing in ear right ear/hole in ear drum Rt    HPI  History of Present Illness  Chief complaint:  Tinnitus right ear    History  The patient is a 55-year-old female who had multiple ear infections as a child.  She had stapes surgery on the right, she believes approximately 38 years ago.  Over the last 2 years she has developed persisting tinnitus on the right.  She denies noticeable hearing loss.  She states she was in recently for hearing test and was told that she likely has a perforation of her right tympanic membrane.  She has had no drainage.  She has had no pain.  She denies vertigo.    Exam  The external auditory canals are clear bilaterally.  There are monomeric areas on the right tympanic membrane.  There is a small dark spot on the drum posteriorly and superiorly that may represent a pinpoint perforation.  The left eardrum appears to be intact.  Remainder of the head neck exam is unremarkable  Audiogram-there is mild mixed hearing loss bilaterally.  She has speech reception threshold of 30 decibels on the right compared to 20/5 on the left.  She has intact discrimination scores.  She has a hyper mobile tympanic membrane on the left and a flat tympanogram on the right with a large volume consistent with the perforation.    Home Medications     - Last Reconciled 09/16/20 by Mary Salter CMA    cyclobenzaprine  (Flexeril)  PO    Allergies    No Known Allergies Allergy (Unverified 09/16/20 10:21)    PFSH  PFSH:     Medical History (Reviewed 09/16/20 @ 10:22 by Mary Salter CMA)    Ear pressure  Headache  Hearing loss  Tinnitus    Surgical History  (Reviewed 09/16/20 @ 10:22 by Mary Salter CMA)    History of ear surgery    Family History (Reviewed 09/16/20 @ 10:22 by Mary Salter CMA)  Other  Cancer  Thyroid disease       Social History (Updated 09/16/20 @ 10:22 by Mary Salter CMA)  Smoking Status:  Never smoker  second hand exposure:  No       A&P  Assessment & Plan  (1) Mixed conductive and sensorineural hearing loss of right ear with restricted hearing of left ear:        Status: Acute        Code(s):  H90.A31 - Mixed conductive and sensorineural hearing loss, unilateral, right ear with restricted hearing on the contralateral side  (2) Sensorineural hearing loss (SNHL) of left ear with restricted hearing of right ear:        Status: Acute        Code(s):  H90.A22 - Sensorineural hearing loss, unilateral, left ear, with restricted hearing on the contralateral side  (3) Tinnitus, right ear:        Status: Acute        Code(s):  H93.11 - Tinnitus, right ear  (4) Tympanic membrane perforation:        Status: Acute        Code(s):  H72.90 - Unspecified perforation of tympanic membrane, unspecified ear        Qualifiers:          Laterality: right  Qualified Code(s): H72.91 - Unspecified perforation of tympanic membrane, right ear  Additional Plan Details  Plan Details: The patient's tympanic membrane perforation is pinpoint and I do not think she would stand to gain much by surgical intervention.  She is not interested in surgery in any regard.  We discussed hearing aids for tinnitus suppression.  We discussed more conservative interventions for tinnitus.  She will follow up with me if we can be of further assistance.      Jacob Duong MD    09/15/20 [id number]    <Electronically signed by Jacob Duong MD> 09/23/20 [id number]

## 2020-10-26 NOTE — PROGRESS NOTES
SUBJECTIVE:     HPI  Rakel Mckay is a 55 year old female who presents to clinic today for evaluation of right leg and foot pain.  Patient states that when she was 14 years of age she stepped on a piece of glass and in 2015 she learned that there was a residual piece of glass left in the bottom of her right foot.  In 2016 orthopedics went in to remove this.  The a while after the surgery she then developed right of foot pain that sometimes radiates up her calf up to her knee.  Notes sometimes the pain feels like muscle pain and other times it feels deeper like it could be bone pain.  She has been wearing a tight foot wrap over her right foot which does provide some relief.  No other known injury.  No associated fever, chills, overlying skin changes, swelling, radiation of symptoms, numbness or tingling.  Is able to bear weight and ambulate.      Review of Systems   Per HPI.     PAST MEDICAL HISTORY:   Past Medical History:   Diagnosis Date     Diffuse cystic mastopathy of breast     No Comments Provided     Encounter for screening for malignant neoplasm of colon     11/11/2014     Female infertility     Secondary to Chlamydia and pelvic infections     Other chest pain     No Comments Provided     Other specified postprocedural states     12/8/2016     Personal history of other mental and behavioral disorders     with fatigue     Superficial foreign body of right foot     11/29/2016       PAST SURGICAL HISTORY:   Past Surgical History:   Procedure Laterality Date     COLONOSCOPY  11/13/2014    Normal 10 year follow up     Pyloric stenosis      Operated in infancy     Removal of foreign body from foot Right 12/08/2016     Salpingostomies  1997     [place], [place]     TONSILLECTOMY       TYMPANOSTOMY, LOCAL/TOPICAL ANESTHESIA      Bilateral TM perforations and tympanostomies in childhood       FAMILY HISTORY:   Family History   Problem Relation Age of Onset     Heart Disease Mother               Cancer Mother          "Cancer, lung cancer     Chronic Obstructive Pulmonary Disease Mother      Hypothyroidism Father      Prostate Cancer Paternal Grandfather         Cancer-prostate     Neuropathy Brother      Hypothyroidism Brother      Breast Cancer No family hx of         Cancer-breast       SOCIAL HISTORY:   Social History     Tobacco Use     Smoking status: Never Smoker     Smokeless tobacco: Never Used   Substance Use Topics     Alcohol use: Not Currently     Alcohol/week: 0.0 standard drinks     Comment: rare      No Known Allergies  Current Outpatient Medications   Medication     cyclobenzaprine (FLEXERIL) 10 MG tablet     No current facility-administered medications for this visit.        OBJECTIVE:     /78   Pulse 86   Temp 98.1  F (36.7  C)   Resp 12   Ht 1.702 m (5' 7\")   Wt 75 kg (165 lb 6.4 oz)   SpO2 96%   Breastfeeding No   BMI 25.91 kg/m    Body mass index is 25.91 kg/m .  Physical Exam  General: Pleasant, in no apparent distress.  Musculoskeletal: Minimal tenderness to palpation over dorsal, lateral right foot. Full ROM of toes, feet, and ankles bilaterally. No other tenderness to palpation. No overlying skin changes or swelling. Strong dorsal pedal pulse and good capillary refill bilaterally   Neurologic Exam: Non-focal, symmetric DTRs, normal gross motor, tone coordination and no visible tremor.  Skin: No jaundice, pallor, rashes, or lesions.  Psych: Appropriate mood and affect.    Results for orders placed or performed during the hospital encounter of 10/27/20   XR Foot Right G/E 3 Views     Status: None    Narrative    PROCEDURE: XR FOOT RT G/E 3 VW 10/27/2020 2:24 PM    HISTORY: Right foot pain    COMPARISONS: None.    TECHNIQUE: 3 views.    FINDINGS: No acute fracture or dislocation is seen. There is no  significant degenerative change.    There is a small plantar calcaneal spur. Mild contour deformity along  the plantar aspect of the distal shaft of the fifth metatarsal is  probably a small bony " exostosis.         Impression    IMPRESSION: No acute bony abnormality.    CHERI VASQUEZ MD         ASSESSMENT/PLAN:     1. Right foot pain      Discussed with patient that unsure of etiology of foot pain at this time. Differential includes muscle strain or sprain, nerve damage, post operative changes, etc. Negative x-rays as outlined above.  She would like to meet with orthopedics for further evaluation and treatment consideration.  In the meantime encourage symptomatic management with Tylenol ibuprofen, wearing a foot brace that does provide some relief.  Follow-up as needed.      Es Burgess PA-C  [facility]

## 2020-10-27 ENCOUNTER — HOSPITAL ENCOUNTER (OUTPATIENT)
Dept: GENERAL RADIOLOGY | Facility: OTHER | Age: 55
End: 2020-10-27
Attending: PHYSICIAN ASSISTANT
Payer: COMMERCIAL

## 2020-10-27 ENCOUNTER — OFFICE VISIT (OUTPATIENT)
Dept: FAMILY MEDICINE | Facility: OTHER | Age: 55
End: 2020-10-27
Attending: PHYSICIAN ASSISTANT
Payer: COMMERCIAL

## 2020-10-27 VITALS
TEMPERATURE: 98.1 F | DIASTOLIC BLOOD PRESSURE: 78 MMHG | BODY MASS INDEX: 25.96 KG/M2 | HEIGHT: 67 IN | HEART RATE: 86 BPM | RESPIRATION RATE: 12 BRPM | OXYGEN SATURATION: 96 % | WEIGHT: 165.4 LBS | SYSTOLIC BLOOD PRESSURE: 138 MMHG

## 2020-10-27 DIAGNOSIS — M79.671 RIGHT FOOT PAIN: Primary | ICD-10-CM

## 2020-10-27 DIAGNOSIS — M79.671 RIGHT FOOT PAIN: ICD-10-CM

## 2020-10-27 PROCEDURE — 73630 X-RAY EXAM OF FOOT: CPT | Mod: RT

## 2020-10-27 PROCEDURE — 99213 OFFICE O/P EST LOW 20 MIN: CPT | Performed by: PHYSICIAN ASSISTANT

## 2020-10-27 ASSESSMENT — MIFFLIN-ST. JEOR: SCORE: 1372.88

## 2020-10-27 ASSESSMENT — PAIN SCALES - GENERAL: PAINLEVEL: MILD PAIN (3)

## 2020-10-27 NOTE — NURSING NOTE
Patient presents to clinic with right leg pain. She had surgery back in 2016 to remove a piece of glass from foot. Pain now radiates from right foot up to the knee for the past 1 1/2 year.  Tigist Aguilar LPN ....................  10/27/2020   1:55 PM

## 2021-01-03 ENCOUNTER — HEALTH MAINTENANCE LETTER (OUTPATIENT)
Age: 56
End: 2021-01-03

## 2021-02-20 ENCOUNTER — ALLIED HEALTH/NURSE VISIT (OUTPATIENT)
Dept: FAMILY MEDICINE | Facility: OTHER | Age: 56
End: 2021-02-20
Attending: FAMILY MEDICINE
Payer: COMMERCIAL

## 2021-02-20 DIAGNOSIS — R09.81 CONGESTION OF PARANASAL SINUS: Primary | ICD-10-CM

## 2021-02-20 PROCEDURE — 87635 SARS-COV-2 COVID-19 AMP PRB: CPT | Mod: ZL | Performed by: FAMILY MEDICINE

## 2021-02-20 PROCEDURE — C9803 HOPD COVID-19 SPEC COLLECT: HCPCS

## 2021-02-21 LAB
LABORATORY COMMENT REPORT: NORMAL
SARS-COV-2 RNA RESP QL NAA+PROBE: NEGATIVE
SARS-COV-2 RNA RESP QL NAA+PROBE: NORMAL
SPECIMEN SOURCE: NORMAL
SPECIMEN SOURCE: NORMAL

## 2021-04-27 ENCOUNTER — OFFICE VISIT (OUTPATIENT)
Dept: SURGERY | Facility: OTHER | Age: 56
End: 2021-04-27
Attending: SURGERY
Payer: COMMERCIAL

## 2021-04-27 VITALS
OXYGEN SATURATION: 99 % | BODY MASS INDEX: 26.16 KG/M2 | TEMPERATURE: 97.9 F | SYSTOLIC BLOOD PRESSURE: 130 MMHG | RESPIRATION RATE: 14 BRPM | DIASTOLIC BLOOD PRESSURE: 80 MMHG | HEART RATE: 82 BPM | WEIGHT: 167 LBS

## 2021-04-27 DIAGNOSIS — N63.25 BREAST LUMP ON LEFT SIDE AT 3 O'CLOCK POSITION: Primary | ICD-10-CM

## 2021-04-27 DIAGNOSIS — N64.4 PAIN OF LEFT BREAST: ICD-10-CM

## 2021-04-27 PROCEDURE — 99204 OFFICE O/P NEW MOD 45 MIN: CPT | Performed by: SURGERY

## 2021-04-27 ASSESSMENT — PAIN SCALES - GENERAL: PAINLEVEL: MILD PAIN (2)

## 2021-04-27 NOTE — NURSING NOTE
"Chief Complaint   Patient presents with     Consult     left breast pain and warmth       Initial /80 (BP Location: Right arm, Patient Position: Sitting, Cuff Size: Adult Regular)   Pulse 82   Temp 97.9  F (36.6  C) (Tympanic)   Resp 14   Wt 75.8 kg (167 lb)   LMP  (LMP Unknown)   SpO2 99%   BMI 26.16 kg/m   Estimated body mass index is 26.16 kg/m  as calculated from the following:    Height as of 10/27/20: 1.702 m (5' 7\").    Weight as of this encounter: 75.8 kg (167 lb).  Medication Reconciliation: complete    At what age did you start menopause? 51  How many children do you have? 1  What age did your menstrual cycle start? 14  How old were you when your first child was born? 35  Did you breast feed? yes  Are you on or have you ever taken any hormone replacement or birth control? Birth control  Do you have a family history of breast cancer? Paternal aunt  Shamika Gomez LPN..........4/27/2021  1:55 PM      "

## 2021-04-27 NOTE — PATIENT INSTRUCTIONS
They will call to set up the mammogram and US. Basilia will set up a follow up with me after that is done, depending on any other studies that radiology recommends. Please call if you have questions or concerns!

## 2021-04-27 NOTE — PROGRESS NOTES
Primary Care Physician: Physician No Ref-Primary      HPI:   The patient is 55 year old female with pain in her left breast. She has had this for 6-8 months. The pain is not cyclical. She has a dull pain most of the time. It can be severe and sharp. She hasn't noted an association with activity or position. She has not had back pain but has some left inner arm pain and some left shoulder pain. No hand or arm swelling. She has no nipple changes or nipple drainage bilaterally. She has noted some new red/pink spots on her left breast. No bruising or dimpling. She hasn't noted any new lumps or bumps-she feels like she always has lumpy breasts. She tried not wearing underwire bra-that didn't change the pain or the ache.  No family history of breast cancer.   Last mammogram was 7/20-heterogeneously dense breast tissue noted. Previous breast biopsy-years ago.       CONSULTATION ASSESSMENT AND PLAN/RECOMMENDATIONS:   I discussed with the patient the pathophysiology of breast pain and breast disease. We specifically discussed that most pain is not related to breast cancer. We discussed options for treatment including warmth, NSAIDs, hormonal manipulation. I explained that good support is important in preventing breast pain. Will get diagnostic mammogram and US depending on the results, we discussed the option of performing a breast MRI. The patient's questions were answered.The patient expressed understanding. She will follow up depending on the US and mammogram findings.    REVIEW OF SYSTEMS  GENERAL: No fevers or chills. Denies fatigue, recent weight loss.  HEENT: No sinus drainage. No changes with vision orhearing. No difficulty swallowing.   LYMPHATICS:  No swollen nodes in axilla, neck or groin.  CARDIOVASCULAR: Denies chest pain, palpitations and dyspnea on exertion.  PULMONARY: No shortness of breath or cough. Noincrease in sputum production.  GI: Denies melena, bright red blood in stools. No hematemesis. No  constipation or diarrhea.  : No dysuria or hematuria.  SKIN: No recent rashes or ulcers.   HEMATOLOGY:  Nohistory of easy bruising or bleeding.  ENDOCRINE:  No history of diabetes or thyroid problems.  NEUROLOGY:  No history of seizures or headaches. No motor or sensory changes.  BREAST: as above    Past Medical History:   Diagnosis Date     Diffuse cystic mastopathy of breast     No Comments Provided     Encounter for screening for malignant neoplasm of colon     11/11/2014     Female infertility     Secondary to Chlamydia and pelvic infections     Other chest pain     No Comments Provided     Other specified postprocedural states     12/8/2016     Personal history of other mental and behavioral disorders     with fatigue     Superficial foreign body of right foot     11/29/2016      Past Surgical History:   Procedure Laterality Date     COLONOSCOPY  11/13/2014    Normal 10 year follow up     Pyloric stenosis      Operated in infancy     Removal of foreign body from foot Right 12/08/2016     Salpingostomies  1997     [place]     TONSILLECTOMY       TYMPANOSTOMY, LOCAL/TOPICAL ANESTHESIA      Bilateral TM perforations and tympanostomies in childhood     Current Outpatient Medications   Medication     cyclobenzaprine (FLEXERIL) 10 MG tablet     No current facility-administered medications for this visit.      No Known Allergies  Family History   Problem Relation Age of Onset     Heart Disease Mother               Cancer Mother         Cancer, lung cancer     Chronic Obstructive Pulmonary Disease Mother      Hypothyroidism Father      Prostate Cancer Paternal Grandfather         Cancer-prostate     Neuropathy Brother      Hypothyroidism Brother      Breast Cancer No family hx of         Cancer-breast     Social History     Socioeconomic History     Marital status:      Spouse name: None     Number of children: None     Years of education: None     Highest education level: None   Occupational History      None   Social Needs     Financial resource strain: None     Food insecurity     Worry: None     Inability: None     Transportation needs     Medical: None     Non-medical: None   Tobacco Use     Smoking status: Never Smoker     Smokeless tobacco: Never Used   Substance and Sexual Activity     Alcohol use: Not Currently     Alcohol/week: 0.0 standard drinks     Comment: rare     Drug use: Never     Sexual activity: Yes     Partners: Male     Birth control/protection: None   Lifestyle     Physical activity     Days per week: None     Minutes per session: None     Stress: None   Relationships     Social connections     Talks on phone: None     Gets together: None     Attends Gnosticism service: None     Active member of club or organization: None     Attends meetings of clubs or organizations: None     Relationship status: None     Intimate partner violence     Fear of current or ex partner: None     Emotionally abused: None     Physically abused: None     Forced sexual activity: None   Other Topics Concern     Parent/sibling w/ CABG, MI or angioplasty before 65F 55M? Not Asked   Social History Narrative    , employed at [employer] in [place].      Drew Spouse, employed at [employer]    Daughter, born 02/01, Barbara. Senior [employer] 2017-18 and plans to attend [employer].      The above history was reviewed and updated today, 4/27/2021    PHYSICAL EXAM  Vitals: /80 (BP Location: Right arm, Patient Position: Sitting, Cuff Size: Adult Regular)   Pulse 82   Temp 97.9  F (36.6  C) (Tympanic)   Resp 14   Wt 75.8 kg (167 lb)   LMP  (LMP Unknown)   SpO2 99%   BMI 26.16 kg/m    GENERAL: Healthy appearing patient in no acute distress. Pleasant and cooperative with exam and interview.   HEENT: Head-normocephalic. Eyes-no scleral icterus, pupils equal, round, andreactive to light. Nose-no nasal drainage. No lesions. Mouth-oral mucosa pink and moist, no lesions.  NECK: Supple. No thyroid nodules. Trachea  midline.  LYMPHATICS:  No cervical, axillary or supraclavicularadenopathy.  CV: Regular rate and rhythm, no murmurs. No peripheral edema.  LUNGS:  No respiratory distress. Clear bilaterally to auscultation.  ABDOMEN: Non distended. Bowel sounds active. Soft, non-tender, nohepatosplenomegaly or hernias. No peritoneal signs.  SKIN: Pink, warm and dry. No jaundice. No rash.  NEURO:  Cranial nerves II-XII grossly intact. Alert and oriented.  PSYCH: Appropriate mood and affect.  BREAST: Breasts were examined in the seated and supine position. No nipple changes or discharge bilaterally. Moderate left lateral breast tenderness noted. 3 o'clock position has a small < 1 cm soft nodule in the area of maximum tenderness.    IMAGING/LAB  I personally reviewed patient's 7/2020 mammogram images and report.  7

## 2021-05-05 ENCOUNTER — HOSPITAL ENCOUNTER (OUTPATIENT)
Dept: ULTRASOUND IMAGING | Facility: OTHER | Age: 56
End: 2021-05-05
Attending: SURGERY
Payer: COMMERCIAL

## 2021-05-05 ENCOUNTER — HOSPITAL ENCOUNTER (OUTPATIENT)
Dept: MAMMOGRAPHY | Facility: OTHER | Age: 56
End: 2021-05-05
Attending: SURGERY
Payer: COMMERCIAL

## 2021-05-05 DIAGNOSIS — N64.4 PAIN OF LEFT BREAST: ICD-10-CM

## 2021-05-05 DIAGNOSIS — N63.25 BREAST LUMP ON LEFT SIDE AT 3 O'CLOCK POSITION: ICD-10-CM

## 2021-05-05 PROCEDURE — G0279 TOMOSYNTHESIS, MAMMO: HCPCS

## 2021-05-05 PROCEDURE — 76642 ULTRASOUND BREAST LIMITED: CPT | Mod: LT

## 2021-05-06 ENCOUNTER — OFFICE VISIT (OUTPATIENT)
Dept: SURGERY | Facility: OTHER | Age: 56
End: 2021-05-06
Attending: SURGERY
Payer: COMMERCIAL

## 2021-05-06 VITALS
OXYGEN SATURATION: 98 % | RESPIRATION RATE: 14 BRPM | SYSTOLIC BLOOD PRESSURE: 110 MMHG | DIASTOLIC BLOOD PRESSURE: 78 MMHG | HEART RATE: 72 BPM

## 2021-05-06 DIAGNOSIS — N63.25 BREAST LUMP ON LEFT SIDE AT 3 O'CLOCK POSITION: ICD-10-CM

## 2021-05-06 DIAGNOSIS — N64.4 PAIN OF LEFT BREAST: Primary | ICD-10-CM

## 2021-05-06 DIAGNOSIS — N60.12 FIBROCYSTIC DISEASE OF LEFT BREAST: ICD-10-CM

## 2021-05-06 PROCEDURE — 99213 OFFICE O/P EST LOW 20 MIN: CPT | Performed by: SURGERY

## 2021-05-06 ASSESSMENT — PAIN SCALES - GENERAL: PAINLEVEL: MILD PAIN (2)

## 2021-05-06 NOTE — PATIENT INSTRUCTIONS
Try the evening primrose oil and or vitamin E. Work on getting a professional bra fitting-great excuse for a shopping trip :). They will call you set up an appointment with Cally in physical therapy. We will see you back in about 3 months for a recheck. If something changes or makes you worried before that-please call, we can see you anytime!

## 2021-05-06 NOTE — PROGRESS NOTES
Primary Care Physician: Physician No Ref-Primary      HPI:   Patient is here for follow up. The patient is 55 year old female with left lateral breast tenderness and fullness. She had US and mammogram done. She hasn't noted any changes since her last visit in tenderness or the skin. No new lumps in the breasts.    ASSESSMENT AND PLAN/RECOMMENDATIONS:   I discussed with the patient the pathophysiology of breast pain and breast disease. We specifically reviewed that most pain is not related to breast cancer. We discussed options for treatment including warmth, NSAIDs, physical therapy, evening primrose oil. I explained that good support is important in preventing breast pain. We discussed having a professional bra fitting to help with comfort and support of the breast tissue. We discussed the option of performing a breast MRI. I recommended follow up with left breast mammogram and US in 6 months as recommended by radiology. The patient would like to have physical therapy evaluation and try the evening primrose oil-she has used that in the past with success. The patient will follow up in 3 months with an office visit for recheck and update. She will call if she has concerns sooner than 3 months. The patient's questions were answered, she denies further questions at this time.     Past Medical History:   Diagnosis Date     Diffuse cystic mastopathy of breast     No Comments Provided     Encounter for screening for malignant neoplasm of colon     11/11/2014     Female infertility     Secondary to Chlamydia and pelvic infections     Other chest pain     No Comments Provided     Other specified postprocedural states     12/8/2016     Personal history of other mental and behavioral disorders     with fatigue     Superficial foreign body of right foot     11/29/2016      Past Surgical History:   Procedure Laterality Date     COLONOSCOPY  11/13/2014    Normal 10 year follow up     Pyloric stenosis      Operated in infancy      Removal of foreign body from foot Right 12/08/2016     Sal[place]ostomies  1997     [place]     TONSILLECTOMY       TYMPANOSTOMY, LOCAL/TOPICAL ANESTHESIA      Bilateral TM perforations and tympanostomies in childhood     Family History   Problem Relation Age of Onset     Heart Disease Mother               Cancer Mother         Cancer, lung cancer     Chronic Obstructive Pulmonary Disease Mother      Hypothyroidism Father      Prostate Cancer Paternal Grandfather         Cancer-prostate     Neuropathy Brother      Hypothyroidism Brother      Breast Cancer No family hx of         Cancer-breast     Social History     Socioeconomic History     Marital status:      Spouse name: None     Number of children: None     Years of education: None     Highest education level: None   Occupational History     None   Social Needs     Financial resource strain: None     Food insecurity     Worry: None     Inability: None     Transportation needs     Medical: None     Non-medical: None   Tobacco Use     Smoking status: Never Smoker     Smokeless tobacco: Never Used   Substance and Sexual Activity     Alcohol use: Not Currently     Alcohol/week: 0.0 standard drinks     Comment: rare     Drug use: Never     Sexual activity: Yes     Partners: Male     Birth control/protection: None   Lifestyle     Physical activity     Days per week: None     Minutes per session: None     Stress: None   Relationships     Social connections     Talks on phone: None     Gets together: None     Attends Mormonism service: None     Active member of club or organization: None     Attends meetings of clubs or organizations: None     Relationship status: None     Intimate partner violence     Fear of current or ex partner: None     Emotionally abused: None     Physically abused: None     Forced sexual activity: None   Other Topics Concern     Parent/sibling w/ CABG, MI or angioplasty before 65F 55M? Not Asked   Social History Narrative    ,  employed at [employer] in [place].      Drew Spouse, employed at [employer]    Daughter, born 02/01, Barbara. Senior [employer] 2017-18 and plans to attend [employer].      Current Outpatient Medications   Medication     cyclobenzaprine (FLEXERIL) 10 MG tablet     No current facility-administered medications for this visit.      No Known Allergies  REVIEW OF SYSTEMS  GENERAL: No fevers or chills. Denies fatigue, recent weight loss.  HEENT: No sinus drainage. No changes with vision or hearing. No difficulty swallowing.   LYMPHATICS:  No swollen nodes in axilla, neck or groin.  CARDIOVASCULAR: Denies chest pain, palpitations and dyspnea on exertion.  PULMONARY: No shortness of breath or cough. No increase in sputum production.  GI: Denies melena, bright red blood in stools. No hematemesis. No constipation or diarrhea.  : No dysuria or hematuria.  SKIN: No recent rashes or ulcers.   HEMATOLOGY:  No history of easy bruising or bleeding.  ENDOCRINE:  No history of diabetes or thyroid problems.  NEUROLOGY:  No history of seizures or headaches. No motor or sensory changes.  BREASTS: as above.  PHYSICAL EXAM  Vitals: /78 (BP Location: Right arm, Patient Position: Sitting, Cuff Size: Adult Regular)   Pulse 72   Resp 14   LMP  (LMP Unknown)   SpO2 98%   GENERAL: Healthy appearing patient in no acute distress. Pleasant and cooperative with exam and interview.   HEENT:Head-normocephalic. Eyes-no scleral icterus. Nose-no nasal drainage. No lesions. Mouth-oral mucosa pink and moist, no lesions.  SKIN: Pink, warm and dry. No jaundice. No rash.  NEURO:  Cranial nerves II-XII grossly intact. Alert and oriented.  PSYCH: Appropriate mood and affect.    IMAGING/LAB  I personally reviewed patient's mammogram and US images and report from 5/5/21 with the patient.

## 2021-05-06 NOTE — NURSING NOTE
"Chief Complaint   Patient presents with     RECHECK     follow up left breast lump       Initial /78 (BP Location: Right arm, Patient Position: Sitting, Cuff Size: Adult Regular)   Pulse 72   Resp 14   LMP  (LMP Unknown)   SpO2 98%  Estimated body mass index is 26.16 kg/m  as calculated from the following:    Height as of 10/27/20: 1.702 m (5' 7\").    Weight as of 4/27/21: 75.8 kg (167 lb).  Medication Reconciliation: complete    Shamika Gomez LPN    "

## 2021-05-16 ENCOUNTER — ALLIED HEALTH/NURSE VISIT (OUTPATIENT)
Dept: FAMILY MEDICINE | Facility: OTHER | Age: 56
End: 2021-05-16
Attending: FAMILY MEDICINE
Payer: COMMERCIAL

## 2021-05-16 DIAGNOSIS — R50.9 FEVER AND CHILLS: Primary | ICD-10-CM

## 2021-05-16 LAB
LABORATORY COMMENT REPORT: ABNORMAL
SARS-COV-2 RNA RESP QL NAA+PROBE: NORMAL
SARS-COV-2 RNA RESP QL NAA+PROBE: POSITIVE
SPECIMEN SOURCE: ABNORMAL
SPECIMEN SOURCE: NORMAL

## 2021-05-16 PROCEDURE — U0003 INFECTIOUS AGENT DETECTION BY NUCLEIC ACID (DNA OR RNA); SEVERE ACUTE RESPIRATORY SYNDROME CORONAVIRUS 2 (SARS-COV-2) (CORONAVIRUS DISEASE [COVID-19]), AMPLIFIED PROBE TECHNIQUE, MAKING USE OF HIGH THROUGHPUT TECHNOLOGIES AS DESCRIBED BY CMS-2020-01-R: HCPCS | Mod: ZL | Performed by: FAMILY MEDICINE

## 2021-05-16 PROCEDURE — U0005 INFEC AGEN DETEC AMPLI PROBE: HCPCS | Mod: ZL | Performed by: FAMILY MEDICINE

## 2021-05-16 PROCEDURE — C9803 HOPD COVID-19 SPEC COLLECT: HCPCS

## 2021-06-17 ENCOUNTER — HOSPITAL ENCOUNTER (OUTPATIENT)
Dept: PHYSICAL THERAPY | Facility: OTHER | Age: 56
Setting detail: THERAPIES SERIES
End: 2021-06-17
Attending: SURGERY
Payer: COMMERCIAL

## 2021-06-17 DIAGNOSIS — N64.4 PAIN OF LEFT BREAST: ICD-10-CM

## 2021-06-17 PROCEDURE — 97112 NEUROMUSCULAR REEDUCATION: CPT | Mod: GP

## 2021-06-17 PROCEDURE — 97140 MANUAL THERAPY 1/> REGIONS: CPT | Mod: GP

## 2021-06-17 PROCEDURE — 97161 PT EVAL LOW COMPLEX 20 MIN: CPT | Mod: GP

## 2021-06-17 NOTE — PROGRESS NOTES
06/17/21 1400   General Information   Type of Visit Initial OP Ortho PT Evaluation   Start of Care Date 06/17/21   Referring Physician Liz Earl MD   Patient/Family Goals Statement reduce pain   Orders Evaluate and Treat   Date of Order 05/06/21   Certification Required? No   Medical Diagnosis pain of left breast   Surgical/Medical history reviewed Yes   Body Part(s)   Body Part(s) Cervical Spine   Presentation and Etiology   Pertinent history of current problem (include personal factors and/or comorbidities that impact the POC) Patient reports pain in left breast over the last nine months. Has a history of cystic breast tissue. MA,  clear. Also feels something under her left shoulder blade, not sure if it is tight muscle or what. No limitations reported with shoulder ROM, neck ROM. No NT. Will get some pain in to the under side of her left upper arm. Also dealing with a dull ache in right lower leg after having a piece of glass removed; Feels it is affecting her over all posture and how she is carrying herself. also dealing with right tinnitis. Denies headaches and injury. Pain concerns her and intefers with clothing wear at times.   Impairments A. Pain   Functional Limitations perform activities of daily living   Onset date of current episode/exacerbation 05/06/21   Chronicity Chronic   Pain rating (0-10 point scale) Other   Pain rating comment does not rate on VAS 0-10   Pain quality B. Dull;C. Aching   Frequency of pain/symptoms B. Intermittent   Pain/symptoms exacerbated by M. Other   Pain exacerbation comment with touch and at random   Pain/symptoms eased by D. Nothing   Progression of symptoms since onset: Unchanged   Prior Level of Function   Prior Level of Function-Mobility no limitations   Prior Level of Function-ADLs no limitations   Current Level of Function   Patient role/employment history A. 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 No   Have you fallen and had an injury in the past year? No   Is patient a fall risk? No   Abuse Screen (yes response referral indicated)   Feels Unsafe at Home or Work/School no   Feels Threatened by Someone no   Does Anyone Try to Keep You From Having Contact with Others or Doing Things Outside Your Home? no   Physical Signs of Abuse Present no   Cervical Spine   Observation hyperactivity of upper  traps noted bilateral   Shoulder AROM Screen full motion noted   Cervical/Thoracic/Shoulder ROM Comments full CROM noted, some limtation with thoracic and ribcage rotation noted with shoulder circles   Shoulder/Wrist/Hand Strength Comments no strength deficits noted   Segmental Mobility-Cervical ERS left C6, ERS left T4   Palpation Postural loading limited head R/L shoulder L/L R/L pelvis R/R; general listening to left upper chest/neck; local listening to left pleural dome. Noted myofascial tightness with left costopleural ligament, left trapezoid ligament. Noted restricted with left 1st rib. Tenderness with palpation of anterior and lateral 3rd and 4th ribs and intercostal space. No noted tenderness to breast tissue but will continue to assess and monitor.    Planned Therapy Interventions   Planned Therapy Interventions joint mobilization;manual therapy;neuromuscular re-education;strengthening;stretching   Planned Modality Interventions   Planned Modality Interventions Cryotherapy   Clinical Impression   Criteria for Skilled Therapeutic Interventions Met yes, treatment indicated   PT Diagnosis myofascial pain and tightness, segmental dysfunctions of cervical and thoracic spine   Influenced by the following impairments pain   Functional limitations due to impairments pain with undergarment wear   Clinical Presentation Stable/Uncomplicated   Clinical Decision Making (Complexity) Low complexity   Therapy Frequency 2 times/Week   Predicted Duration of Therapy Intervention  (days/wks) 3 months   Risk & Benefits of therapy have been explained Yes   Patient, Family & other staff in agreement with plan of care Yes   Clinical Impression Comments Patient with reported breast pain noted to have rib cage and thoracic spine dysfunctions with accompanying myofascial tightness.    ORTHO GOALS   PT Ortho Eval Goals 1;2   Ortho Goal 1   Goal Identifier pain   Goal Description Patient to report cessation of left breast/rib cage pain with clothing wear and at random.    Target Date 09/17/21   Ortho Goal 2   Goal Identifier joint mobility   Goal Description Patient to demonstrate normal joint mechanics of cervical/thoracic spine and rib cage to reduce irriation to myofascial structures leading to noted left breast pain.    Target Date 09/17/21   Total Evaluation Time   PT Elpidio Low Complexity Minutes ([id number]) 20

## 2021-06-21 ENCOUNTER — HOSPITAL ENCOUNTER (OUTPATIENT)
Dept: PHYSICAL THERAPY | Facility: OTHER | Age: 56
Setting detail: THERAPIES SERIES
End: 2021-06-21
Attending: SURGERY
Payer: COMMERCIAL

## 2021-06-21 PROCEDURE — 97112 NEUROMUSCULAR REEDUCATION: CPT | Mod: GP

## 2021-06-21 PROCEDURE — 97140 MANUAL THERAPY 1/> REGIONS: CPT | Mod: GP

## 2021-06-23 ENCOUNTER — HOSPITAL ENCOUNTER (OUTPATIENT)
Dept: PHYSICAL THERAPY | Facility: OTHER | Age: 56
Setting detail: THERAPIES SERIES
End: 2021-06-23
Attending: SURGERY
Payer: COMMERCIAL

## 2021-06-23 PROCEDURE — 97112 NEUROMUSCULAR REEDUCATION: CPT | Mod: GP

## 2021-06-29 ENCOUNTER — HOSPITAL ENCOUNTER (OUTPATIENT)
Dept: PHYSICAL THERAPY | Facility: OTHER | Age: 56
Setting detail: THERAPIES SERIES
End: 2021-06-29
Attending: SURGERY
Payer: COMMERCIAL

## 2021-06-29 PROCEDURE — 97112 NEUROMUSCULAR REEDUCATION: CPT | Mod: GP

## 2021-06-29 PROCEDURE — 97140 MANUAL THERAPY 1/> REGIONS: CPT | Mod: GP

## 2021-07-01 ENCOUNTER — HOSPITAL ENCOUNTER (OUTPATIENT)
Dept: PHYSICAL THERAPY | Facility: OTHER | Age: 56
Setting detail: THERAPIES SERIES
End: 2021-07-01
Attending: SURGERY
Payer: COMMERCIAL

## 2021-07-01 PROCEDURE — 97112 NEUROMUSCULAR REEDUCATION: CPT | Mod: GP

## 2021-08-05 ENCOUNTER — TELEPHONE (OUTPATIENT)
Dept: SURGERY | Facility: OTHER | Age: 56
End: 2021-08-05

## 2021-08-05 NOTE — TELEPHONE ENCOUNTER
Unit 1 schedulers report patient declines follow up appt in clinic with Dr Earl at this time.  She will schedule something when she comes in for her annual mammogram.

## 2021-08-25 NOTE — PROGRESS NOTES
Outpatient Physical Therapy Discharge Note     Patient: Rakel Mckay  : 1965    Beginning/End Dates of Reporting Period:  21 to 21    Referring Provider: Liz Earl MD    Therapy Diagnosis: myofascial pain and tightness, segmental dysfunctions of cervical and thoracic spine     Client Self Report: Feeling better. No pain.     Objective Measurements:  Objective Measure: postural loading  Details: limited loading left shoulder; general listening to left lumbar; local listening to L3  Objective Measure: myofascial  Details: posterior longitudinal ligament L3  Objective Measure: joint  Details: ERS left L3        Goals:  Goal Identifier pain   Goal Description Patient to report cessation of left breast/rib cage pain with clothing wear and at random.    Target Date 21   Date Met   21   Progress (detail required for progress note):     Goal Identifier joint mobility   Goal Description Patient to demonstrate normal joint mechanics of cervical/thoracic spine and rib cage to reduce irriation to myofascial structures leading to noted left breast pain.    Target Date 21   Date Met   21   Progress (detail required for progress note):         Plan:  Discharge from therapy.    Discharge:    Reason for Discharge: Patient has met all goals.    Equipment Issued: NA    Discharge Plan: Patient to continue home program.

## 2021-09-30 NOTE — PROGRESS NOTES
SUBJECTIVE:   CC: Rakel Mckay is an 56 year old woman who presents for preventive health visit.       Patient has been advised of split billing requirements and indicates understanding: Yes  HPI   Here for annual physical. Is requesting refill of Flexeril to help with tension headaches. Takes at night to help fall asleep.    Contraception: Postmenopausal  Risk for STI?: No  Last pap: 2018, pap and HPV negative  Any hx of abnormal paps:  No  FH of early CA?: Mother= lung cancer, paternal grandfather- prostate cancer  Cholesterol/DM concerns/screening: Due  Tobacco?: No  Calcium intake: Dietary  DEXA: NA  Last mammo: 05/2021- diagnostic, follows with surgeon for fibrocystic disease  Colonoscopy: 2014, 10 year follow up  Immunizations: Influenza, Zoster, COVID- all declined    Today's PHQ-2 Score:   PHQ-2 ( 1999 Pfizer) 10/1/2021   Q1: Little interest or pleasure in doing things 0   Q2: Feeling down, depressed or hopeless 0   PHQ-2 Score 0   Q1: Little interest or pleasure in doing things Not at all   Q2: Feeling down, depressed or hopeless Not at all   PHQ-2 Score 0       Have you ever done Advance Care Planning? (For example, a Health Directive, POLST, or a discussion with a medical provider or your loved ones about your wishes): No, advance care planning information given to patient to review.  Patient declined advance care planning discussion at this time.    Social History     Tobacco Use     Smoking status: Never Smoker     Smokeless tobacco: Never Used   Substance Use Topics     Alcohol use: Not Currently     Alcohol/week: 0.0 standard drinks     Comment: rare       Reviewed orders with patient.  Reviewed health maintenance and updated orders accordingly - Yes      Breast Cancer Screening:  Any new diagnosis of family breast, ovarian, or bowel cancer? No    FHS-7: No flowsheet data found.  Mammogram Screening: Recommended mammography every 1-2 years with patient discussion and risk factor  consideration  Pertinent mammograms are reviewed under the imaging tab.    History of abnormal Pap smear: Last 3 Pap and HPV Results:       Reviewed and updated as needed this visit by clinical staff  Tobacco  Allergies  Meds  Problems  Med Hx  Surg Hx  Fam Hx  Soc Hx          Reviewed and updated as needed this visit by Provider  Tobacco  Allergies  Meds  Problems  Med Hx  Surg Hx  Fam Hx         Past Medical History:   Diagnosis Date     Diffuse cystic mastopathy of breast     No Comments Provided     Encounter for screening for malignant neoplasm of colon     2014     Female infertility     Secondary to Chlamydia and pelvic infections     Other chest pain     No Comments Provided     Other specified postprocedural states     2016     Personal history of other mental and behavioral disorders     with fatigue     Superficial foreign body of right foot     2016      Past Surgical History:   Procedure Laterality Date     COLONOSCOPY  2014    Normal 10 year follow up     Pyloric stenosis      Operated in infancy     Removal of foreign body from foot Right 2016     Salpingostomies       FAUSTINA Po[facility]     TONSILLECTOMY       TYMPANOSTOMY, LOCAL/TOPICAL ANESTHESIA      Bilateral TM perforations and tympanostomies in childhood     OB History    Para Term  AB Living   1 1 1 0 0 1   SAB TAB Ectopic Multiple Live Births   0 0 0 0 1      # Outcome Date GA Lbr Davie/2nd Weight Sex Delivery Anes PTL Lv   1 Term 01    F Vag-Vacuum   JOSE       Review of Systems  CONSTITUTIONAL: NEGATIVE for fever, chills, change in weight  INTEGUMENTARU/SKIN: NEGATIVE for worrisome rashes, moles or lesions  EYES: NEGATIVE for vision changes or irritation  ENT: NEGATIVE for ear, mouth and throat problems  RESP: NEGATIVE for significant cough or SOB  BREAST: NEGATIVE for masses, tenderness or discharge  CV: NEGATIVE for chest pain, palpitations or peripheral edema  GI: NEGATIVE for  "nausea, abdominal pain, heartburn, or change in bowel habits  : NEGATIVE for unusual urinary or vaginal symptoms. Periods are regular.  MUSCULOSKELETAL: NEGATIVE for significant arthralgias or myalgia  NEURO: NEGATIVE for weakness, dizziness or paresthesias  PSYCHIATRIC: NEGATIVE for changes in mood or affect     OBJECTIVE:   /74   Pulse 89   Temp 97.4  F (36.3  C)   Resp 14   Ht 1.683 m (5' 6.25\")   Wt 73.8 kg (162 lb 9.6 oz)   LMP  (LMP Unknown)   SpO2 98%   Breastfeeding No   BMI 26.05 kg/m    Physical Exam  GENERAL APPEARANCE: healthy, alert and no distress  EYES: Eyes grossly normal to inspection, PERRL and conjunctivae and sclerae normal  HENT: ear canals and TM's normal, nose and mouth without ulcers or lesions, oropharynx clear and oral mucous membranes moist  NECK: no adenopathy, no asymmetry, masses, or scars and thyroid normal to palpation  RESP: lungs clear to auscultation - no rales, rhonchi or wheezes  BREAST: normal without masses, tenderness or nipple discharge and no palpable axillary masses or adenopathy  CV: regular rate and rhythm, normal S1 S2, no S3 or S4, no murmur, click or rub, no peripheral edema and peripheral pulses strong  ABDOMEN: soft, nontender, no hepatosplenomegaly, no masses and bowel sounds normal  MS: no musculoskeletal defects are noted and gait is age appropriate without ataxia  SKIN: no suspicious lesions or rashes  NEURO: Normal strength and tone, sensory exam grossly normal, mentation intact and speech normal  PSYCH: mentation appears normal and affect normal/bright    Diagnostic Test Results:  Labs reviewed in Epic    ASSESSMENT/PLAN:       ICD-10-CM    1. Routine general medical examination at a health care facility  Z00.00 CBC and Differential     Basic Metabolic Panel     Lipid Panel     Lipid Panel     Basic Metabolic Panel     CBC and Differential   2. Tension headache  G44.209 cyclobenzaprine (FLEXERIL) 10 MG tablet   3. Lipid screening  Z13.220 Lipid " "Panel     Lipid Panel     Blood work pending as above.  Will notify with results and treat if indicated.  Refilled Flexeril for tension headaches as above.  Up-to-date on screening exams.  Patient declined Covid, influenza, zoster immunizations.  Encourage healthy diet and exercise.  Follow-up in 1 year or sooner if needed.      Patient has been advised of split billing requirements and indicates understanding: Yes  COUNSELING:  Reviewed preventive health counseling, as reflected in patient instructions    Estimated body mass index is 26.05 kg/m  as calculated from the following:    Height as of this encounter: 1.683 m (5' 6.25\").    Weight as of this encounter: 73.8 kg (162 lb 9.6 oz).    Weight management plan: Discussed healthy diet and exercise guidelines    She reports that she has never smoked. She has never used smokeless tobacco.      Counseling Resources:  ATP IV Guidelines  Pooled Cohorts Equation Calculator  Breast Cancer Risk Calculator  BRCA-Related Cancer Risk Assessment: FHS-7 Tool  FRAX Risk Assessment  ICSI Preventive Guidelines  Dietary Guidelines for Americans, 2010  USDA's MyPlate  ASA Prophylaxis  Lung CA Screening    Es Burgess PA-C  [facility] AND [facility]  Answers for HPI/ROS submitted by the patient on 10/1/2021  WENDY 7 TOTAL SCORE: 1      "

## 2021-10-01 ENCOUNTER — OFFICE VISIT (OUTPATIENT)
Dept: FAMILY MEDICINE | Facility: OTHER | Age: 56
End: 2021-10-01
Attending: PHYSICIAN ASSISTANT
Payer: COMMERCIAL

## 2021-10-01 VITALS
WEIGHT: 162.6 LBS | SYSTOLIC BLOOD PRESSURE: 116 MMHG | DIASTOLIC BLOOD PRESSURE: 74 MMHG | HEIGHT: 66 IN | TEMPERATURE: 97.4 F | HEART RATE: 89 BPM | OXYGEN SATURATION: 98 % | BODY MASS INDEX: 26.13 KG/M2 | RESPIRATION RATE: 14 BRPM

## 2021-10-01 DIAGNOSIS — G44.209 TENSION HEADACHE: ICD-10-CM

## 2021-10-01 DIAGNOSIS — Z00.00 ROUTINE GENERAL MEDICAL EXAMINATION AT A HEALTH CARE FACILITY: Primary | ICD-10-CM

## 2021-10-01 DIAGNOSIS — Z13.220 LIPID SCREENING: ICD-10-CM

## 2021-10-01 LAB
ANION GAP SERPL CALCULATED.3IONS-SCNC: 5 MMOL/L (ref 3–14)
BASOPHILS # BLD AUTO: 0 10E3/UL (ref 0–0.2)
BASOPHILS NFR BLD AUTO: 0 %
BUN SERPL-MCNC: 17 MG/DL (ref 7–25)
CALCIUM SERPL-MCNC: 10.2 MG/DL (ref 8.6–10.3)
CHLORIDE BLD-SCNC: 104 MMOL/L (ref 98–107)
CHOLEST SERPL-MCNC: 230 MG/DL
CO2 SERPL-SCNC: 33 MMOL/L (ref 21–31)
CREAT SERPL-MCNC: 0.86 MG/DL (ref 0.6–1.2)
EOSINOPHIL # BLD AUTO: 0.1 10E3/UL (ref 0–0.7)
EOSINOPHIL NFR BLD AUTO: 1 %
ERYTHROCYTE [DISTWIDTH] IN BLOOD BY AUTOMATED COUNT: 12.2 % (ref 10–15)
FASTING STATUS PATIENT QL REPORTED: NO
GFR SERPL CREATININE-BSD FRML MDRD: 75 ML/MIN/1.73M2
GLUCOSE BLD-MCNC: 90 MG/DL (ref 70–105)
HCT VFR BLD AUTO: 44.4 % (ref 35–47)
HDLC SERPL-MCNC: 49 MG/DL (ref 23–92)
HGB BLD-MCNC: 14.8 G/DL (ref 11.7–15.7)
IMM GRANULOCYTES # BLD: 0 10E3/UL
IMM GRANULOCYTES NFR BLD: 0 %
LDLC SERPL CALC-MCNC: 150 MG/DL
LYMPHOCYTES # BLD AUTO: 2.1 10E3/UL (ref 0.8–5.3)
LYMPHOCYTES NFR BLD AUTO: 34 %
MCH RBC QN AUTO: 29.1 PG (ref 26.5–33)
MCHC RBC AUTO-ENTMCNC: 33.3 G/DL (ref 31.5–36.5)
MCV RBC AUTO: 87 FL (ref 78–100)
MONOCYTES # BLD AUTO: 0.5 10E3/UL (ref 0–1.3)
MONOCYTES NFR BLD AUTO: 7 %
NEUTROPHILS # BLD AUTO: 3.7 10E3/UL (ref 1.6–8.3)
NEUTROPHILS NFR BLD AUTO: 58 %
NONHDLC SERPL-MCNC: 181 MG/DL
NRBC # BLD AUTO: 0 10E3/UL
NRBC BLD AUTO-RTO: 0 /100
PLATELET # BLD AUTO: 248 10E3/UL (ref 150–450)
POTASSIUM BLD-SCNC: 4.9 MMOL/L (ref 3.5–5.1)
RBC # BLD AUTO: 5.09 10E6/UL (ref 3.8–5.2)
SODIUM SERPL-SCNC: 142 MMOL/L (ref 134–144)
TRIGL SERPL-MCNC: 156 MG/DL
WBC # BLD AUTO: 6.4 10E3/UL (ref 4–11)

## 2021-10-01 PROCEDURE — 80061 LIPID PANEL: CPT | Mod: ZL | Performed by: PHYSICIAN ASSISTANT

## 2021-10-01 PROCEDURE — 99396 PREV VISIT EST AGE 40-64: CPT | Performed by: PHYSICIAN ASSISTANT

## 2021-10-01 PROCEDURE — 36415 COLL VENOUS BLD VENIPUNCTURE: CPT | Mod: ZL | Performed by: PHYSICIAN ASSISTANT

## 2021-10-01 PROCEDURE — 80048 BASIC METABOLIC PNL TOTAL CA: CPT | Mod: ZL | Performed by: PHYSICIAN ASSISTANT

## 2021-10-01 PROCEDURE — 85025 COMPLETE CBC W/AUTO DIFF WBC: CPT | Mod: ZL | Performed by: PHYSICIAN ASSISTANT

## 2021-10-01 RX ORDER — CYCLOBENZAPRINE HCL 10 MG
10 TABLET ORAL
Qty: 20 TABLET | Refills: 0 | Status: SHIPPED | OUTPATIENT
Start: 2021-10-01 | End: 2021-12-27

## 2021-10-01 ASSESSMENT — ANXIETY QUESTIONNAIRES
7. FEELING AFRAID AS IF SOMETHING AWFUL MIGHT HAPPEN: NOT AT ALL
GAD7 TOTAL SCORE: 1
GAD7 TOTAL SCORE: 1
8. IF YOU CHECKED OFF ANY PROBLEMS, HOW DIFFICULT HAVE THESE MADE IT FOR YOU TO DO YOUR WORK, TAKE CARE OF THINGS AT HOME, OR GET ALONG WITH OTHER PEOPLE?: NOT DIFFICULT AT ALL
4. TROUBLE RELAXING: NOT AT ALL
2. NOT BEING ABLE TO STOP OR CONTROL WORRYING: SEVERAL DAYS
GAD7 TOTAL SCORE: 1
3. WORRYING TOO MUCH ABOUT DIFFERENT THINGS: NOT AT ALL
7. FEELING AFRAID AS IF SOMETHING AWFUL MIGHT HAPPEN: NOT AT ALL
1. FEELING NERVOUS, ANXIOUS, OR ON EDGE: NOT AT ALL
5. BEING SO RESTLESS THAT IT IS HARD TO SIT STILL: NOT AT ALL
6. BECOMING EASILY ANNOYED OR IRRITABLE: NOT AT ALL

## 2021-10-01 ASSESSMENT — PAIN SCALES - GENERAL: PAINLEVEL: NO PAIN (1)

## 2021-10-01 ASSESSMENT — MIFFLIN-ST. JEOR: SCORE: 1343.27

## 2021-10-01 NOTE — NURSING NOTE
Patient presents to clinic for annual physical exam.  Tigist Aguilar LPN ....................  10/1/2021   10:03 AM

## 2021-10-02 ASSESSMENT — ANXIETY QUESTIONNAIRES: GAD7 TOTAL SCORE: 1

## 2021-11-08 DIAGNOSIS — H91.93 DECREASED HEARING OF BOTH EARS: Primary | ICD-10-CM

## 2021-11-12 NOTE — PROGRESS NOTES
Assessment & Plan     1. Hip pain, left  Hip pain is likely referred pain from low back pain is there was no known injury, no tenderness palpation, not worse with activity.  Recommend symptomatic management with Tylenol or ibuprofen, icing, heating, stretching and activity as tolerated.  Consider physical therapy referral.  - XR Hip Left 2-3 Views; Future    2. Chronic bilateral low back pain without sciatica  History of chronic low back pain without any associated injury.  Likely related to new onset of left hip pain.  X-ray showing mild degenerative changes without acute injury.  Encourage symptomatic relief with Tylenol or ibuprofen, icing, heating, stretching, activity as tolerated.  Consider possible physical therapy referral going forward.  - XR Lumbar Spine 2/3 Views; Future    3. Urinary urgency  Given symptoms have been going on for years not likely infectious.  Did offer urinalysis but patient declines at this time.  Likely related to increase in fluids and bladder irritants associated with flare of symptoms.  Encourage decreased intake of bladder irritants and follow-up as needed.    4. Plantar fasciitis  Symptoms consistent with plantar fasciitis.  Encourage symptomatic management with NSAIDs, icing, stretching.  Follow-up as needed.    5. Skin tag  Lesion on left upper back consistent with skin tag.  Offered removal but patient declines at this time.  She will continue to monitor.    6. Benign nevus  Patient on right upper back consistent with a probable benign nevus.  Offered removal but patient declines at this time.  She will continue to monitor and follow-up if needed.      No follow-ups on file.    Es Burgess PA-C  [facility] AND [facility]   Rakel is a 56 year old who presents for the following health issues     HPI     Here for evaluation of multiple concerns.     Left hip:  Reporting left hip pain that is at night for the past 6 months.  Minimal pain during the  day but when she is laying down or sleeping at night she is having significant pain.  Has been managing with Tylenol.  No known injury.  Does not seem to be worse with activity.  Pain is affecting her sleep so much she now sleeps on the sofa instead of in her bed.    Back pain:  Patient with longstanding history of lower back pain on and off.  No known injury.  Managing with over-the-counter analgesics.  No associated numbness or tingling, weakness, foot drop, saddle anesthesia, loss of bowel or bladder control.  Bladder symptoms as outlined below do not appear related to back pain.    Bladder:  Patient reports she has been struggling with on and off urinary frequency and urgency over the past several years.  She does note this seems to be associated with when she is drinking more caffeine or fluids.  No associated dysuria, hematuria, flank pain, abdominal pain, nausea, vomiting, diarrhea, constipation, vaginal symptoms.    Heels:  Reports more recently she has had some bilateral heel pain when she is waking up in the morning through first 30 minutes or so.  Symptoms resolve after that point.  She is often wearing shoes with good arch support.  Has not tried much for symptomatic relief.  No known injury.    Moles:  Patient reports she has a couple of moles on her back that she would like looked at.  There is one that itches.  Otherwise no associated pain, bleeding, drainage.      PAST MEDICAL HISTORY:   Past Medical History:   Diagnosis Date     Diffuse cystic mastopathy of breast     No Comments Provided     Encounter for screening for malignant neoplasm of colon     11/11/2014     Female infertility     Secondary to Chlamydia and pelvic infections     Other chest pain     No Comments Provided     Other specified postprocedural states     12/8/2016     Personal history of other mental and behavioral disorders     with fatigue     Superficial foreign body of right foot     11/29/2016       PAST SURGICAL HISTORY:    Past Surgical History:   Procedure Laterality Date     COLONOSCOPY  11/13/2014    Normal 10 year follow up     Pyloric stenosis      Operated in infancy     Removal of foreign body from foot Right 12/08/2016     Salpingostomies  1997     [place], [place]     TONSILLECTOMY       TYMPANOSTOMY, LOCAL/TOPICAL ANESTHESIA      Bilateral TM perforations and tympanostomies in childhood       FAMILY HISTORY:   Family History   Problem Relation Age of Onset     Heart Disease Mother               Cancer Mother         Cancer, lung cancer     Chronic Obstructive Pulmonary Disease Mother      Hypothyroidism Father      Prostate Cancer Paternal Grandfather         Cancer-prostate     Neuropathy Brother      Hypothyroidism Brother      Breast Cancer No family hx of         Cancer-breast       SOCIAL HISTORY:   Social History     Tobacco Use     Smoking status: Never Smoker     Smokeless tobacco: Never Used   Substance Use Topics     Alcohol use: Not Currently     Alcohol/week: 0.0 standard drinks     Comment: rare      No Known Allergies  Current Outpatient Medications   Medication     cyclobenzaprine (FLEXERIL) 10 MG tablet     No current facility-administered medications for this visit.         Review of Systems   Per HPI        Objective    /70 (BP Location: Right arm, Patient Position: Sitting, Cuff Size: Adult Regular)   Pulse 70   Temp 97  F (36.1  C) (Tympanic)   Resp 16   Wt 73.9 kg (163 lb)   LMP  (LMP Unknown)   SpO2 99%   Breastfeeding No   BMI 26.11 kg/m    Body mass index is 26.11 kg/m .  Physical Exam   General: Pleasant, in no apparent distress.  Musculoskeletal: Back is straight, no tenderness to palpation of paraspinal and paravertebral muscles. Full ROM of back, neck, upper and lower extremities.  No tenderness palpation throughout left hip.  Full range of active and passive range of motion of hips bilaterally.  Negative straight leg test.  Minimal tenderness palpation throughout heels and plantar  fascia bilaterally.  Nonantalgic gait in clinic.  Neurologic Exam: normal gross motor, tone coordination and no visible tremor.  Skin: Small flesh-colored pedunculated lesion on left upper middle back.  Flesh/pink-colored raised smooth lesion on right middle back.  No signs of infection, bleeding, drainage.  Psych: Appropriate mood and affect.

## 2021-11-15 ENCOUNTER — OFFICE VISIT (OUTPATIENT)
Dept: FAMILY MEDICINE | Facility: OTHER | Age: 56
End: 2021-11-15
Attending: PHYSICIAN ASSISTANT
Payer: COMMERCIAL

## 2021-11-15 ENCOUNTER — HOSPITAL ENCOUNTER (OUTPATIENT)
Dept: GENERAL RADIOLOGY | Facility: OTHER | Age: 56
End: 2021-11-15
Attending: PHYSICIAN ASSISTANT
Payer: COMMERCIAL

## 2021-11-15 VITALS
TEMPERATURE: 97 F | HEART RATE: 70 BPM | RESPIRATION RATE: 16 BRPM | WEIGHT: 163 LBS | OXYGEN SATURATION: 99 % | BODY MASS INDEX: 26.11 KG/M2 | SYSTOLIC BLOOD PRESSURE: 110 MMHG | DIASTOLIC BLOOD PRESSURE: 70 MMHG

## 2021-11-15 DIAGNOSIS — M25.552 HIP PAIN, LEFT: Primary | ICD-10-CM

## 2021-11-15 DIAGNOSIS — M72.2 PLANTAR FASCIITIS: ICD-10-CM

## 2021-11-15 DIAGNOSIS — M54.50 CHRONIC BILATERAL LOW BACK PAIN WITHOUT SCIATICA: ICD-10-CM

## 2021-11-15 DIAGNOSIS — M25.552 HIP PAIN, LEFT: ICD-10-CM

## 2021-11-15 DIAGNOSIS — R39.15 URINARY URGENCY: ICD-10-CM

## 2021-11-15 DIAGNOSIS — D22.9 BENIGN NEVUS: ICD-10-CM

## 2021-11-15 DIAGNOSIS — L91.8 SKIN TAG: ICD-10-CM

## 2021-11-15 DIAGNOSIS — G89.29 CHRONIC BILATERAL LOW BACK PAIN WITHOUT SCIATICA: ICD-10-CM

## 2021-11-15 PROCEDURE — 99214 OFFICE O/P EST MOD 30 MIN: CPT | Performed by: PHYSICIAN ASSISTANT

## 2021-11-15 PROCEDURE — 72100 X-RAY EXAM L-S SPINE 2/3 VWS: CPT

## 2021-11-15 PROCEDURE — 73502 X-RAY EXAM HIP UNI 2-3 VIEWS: CPT

## 2021-11-15 ASSESSMENT — PAIN SCALES - GENERAL: PAINLEVEL: NO PAIN (1)

## 2021-11-15 NOTE — NURSING NOTE
Chief Complaint   Patient presents with     Pain     Multiple concerns      Left hip pain, Back pain, Bladder control issues.   Danisha Lewis LPN........................11/15/2021  8:25 AM     Medication Reconciliation: completed   Danisha Lewis LPN  11/15/2021 8:24 AM

## 2021-11-19 ENCOUNTER — HOSPITAL ENCOUNTER (OUTPATIENT)
Dept: MAMMOGRAPHY | Facility: OTHER | Age: 56
End: 2021-11-19
Attending: SURGERY
Payer: COMMERCIAL

## 2021-11-19 DIAGNOSIS — R92.8 ABNORMAL FINDING ON BREAST IMAGING: ICD-10-CM

## 2021-11-19 DIAGNOSIS — Z09 FOLLOW-UP EXAM, 3-6 MONTHS SINCE PREVIOUS EXAM: ICD-10-CM

## 2021-11-19 PROCEDURE — 77061 BREAST TOMOSYNTHESIS UNI: CPT | Mod: LT

## 2021-12-10 ENCOUNTER — TELEPHONE (OUTPATIENT)
Dept: OTOLARYNGOLOGY | Facility: OTHER | Age: 56
End: 2021-12-10
Payer: COMMERCIAL

## 2021-12-10 NOTE — PROGRESS NOTES
"Otolaryngology Progress Note          Rakel Mckay is a 56 year old female    Follow-up of a right mixed hearing loss and a right tympanic membrane perforation.      She is noticing worsening right tinnitus  Some increased life stressors    She denies chronic otorrhea, vertigo, worsening HL or flux HL  Right ear feels full chronically  She has having some difficulty communicating with mask wearing    Significant history of right ossicular reconstruction as a teenager and childhood chronic otitis media    I last saw her in 2019 and noted a right posterior microperforation which was pinpoint without evidence of prosthesis extrusion    Audiogram dated 11/25/2019 showed normal thresholds sloping to a right worse than left moderate to moderate to severe high-frequency sensorineural hearing loss with a mixed right loss no obvious concerning asymmetrical sensorineural hearing loss   large volume B tympanogram on the right  Ad left  100% discrimination bilaaterally   SRT 20 dB right 10 dB left     Audiogram today's date shows a slight worsening of the right high-frequency conductive hearing loss discrimination on the right is now 92%.  Thresholds are with and 10 dB of previous audiogram shows a right moderate sloping to moderate to severe mixed loss left normal thresholds with a moderate high-frequency sensorineural hearing loss    I saw her in 2019 for right tinnitus.  At that time there were no concerns for fluctuating hearing loss vertigo otorrhea or otalgia.    She had seen Dr. Duong in the past and was diagnosed with TMJ she has a history of migraines      Physical Exam  /70 (BP Location: Left arm, Cuff Size: Adult Regular)   Pulse 64   Temp 97.5  F (36.4  C) (Tympanic)   Ht 1.702 m (5' 7\")   Wt 72.6 kg (160 lb)   LMP  (LMP Unknown)   SpO2 98%   BMI 25.06 kg/m    General - The patient is well nourished and well developed, and appears to have good nutritional status.  Alert and oriented to person and " place, interactive.  Head and Face - Normocephalic and atraumatic, with no gross asymmetry noted of the contour of the facial features.  The facial nerve is intact, with strong symmetric movements.  Neck-no palpable lymphadenopathy or thyroid mass.  Trachea is midline.  Eyes - Extraocular movements intact.   Ears- examined under microscopy bilaterally  External auditory canals are patent, tympanic membranes are intact without effusion or worrisome retractions   No evidence of prosthesis extrusion on the right   Mild anterior superior retraction right  Nose - Nasal mucosa is pink and moist with no abnormal mucus.  The septum was grossly midline and non-obstructive, turbinates of normal size and position.  No polyps, masses, or purulence noted on examination.  Mouth - Examination of the oral cavity shows pink, healthy, moist mucosa.  No lesions or ulceration noted.  The dentition are in good repair.  The tongue is mobile and midline.  Throat - The walls of the oropharynx were smooth, pink, moist, symmetric, and had no lesions or ulcerations.  The tonsillar pillars and soft palate were symmetric.  The uvula was midline on elevation.      Impression/Plan  Rakel Mckay is a 56 year old female    ICD-10-CM    1. Mixed conductive and sensorineural hearing loss of right ear with restricted hearing of left ear  H90.A31 Adult Audiology Referral   2. Tinnitus, right  H93.11    3. History of ear surgery  Z98.890          Annual audiogram and follow up with Chacha  You are medically cleared for hearing aids  Ears look good    Tinnitus education was provided.  Tinnitus is widely considered a disorder of cental auditory processing.    Hearing preservation was reinforced  I also cautioned the patient against investing in any oral supplements advertised to cure tinnitus.     I have also recommended yearly audiograms, masking devices or apps.  For worsening symptoms, I recommend online or in person cognitive behavioral therapy (CBT)  referral.     The patient will follow up as necessary for worsening symptoms or changes in symptoms.            Kathryn Patel D.O.  Otolaryngology/Head and Neck Surgery  Allergy

## 2021-12-10 NOTE — TELEPHONE ENCOUNTER
LM for making Amanda's appt later that morning. We still want Rakel to come in for the audio and see Amanda a little later that same day.

## 2021-12-13 ENCOUNTER — OFFICE VISIT (OUTPATIENT)
Dept: OTOLARYNGOLOGY | Facility: OTHER | Age: 56
End: 2021-12-13
Attending: AUDIOLOGIST
Payer: COMMERCIAL

## 2021-12-13 ENCOUNTER — OFFICE VISIT (OUTPATIENT)
Dept: AUDIOLOGY | Facility: OTHER | Age: 56
End: 2021-12-13
Attending: AUDIOLOGIST
Payer: COMMERCIAL

## 2021-12-13 VITALS
HEART RATE: 64 BPM | BODY MASS INDEX: 25.11 KG/M2 | DIASTOLIC BLOOD PRESSURE: 70 MMHG | SYSTOLIC BLOOD PRESSURE: 122 MMHG | WEIGHT: 160 LBS | HEIGHT: 67 IN | TEMPERATURE: 97.5 F | OXYGEN SATURATION: 98 %

## 2021-12-13 DIAGNOSIS — H90.A31 MIXED CONDUCTIVE AND SENSORINEURAL HEARING LOSS OF RIGHT EAR WITH RESTRICTED HEARING OF LEFT EAR: Primary | ICD-10-CM

## 2021-12-13 DIAGNOSIS — Z98.890 HISTORY OF EAR SURGERY: ICD-10-CM

## 2021-12-13 DIAGNOSIS — H69.91 DYSFUNCTION OF RIGHT EUSTACHIAN TUBE: ICD-10-CM

## 2021-12-13 DIAGNOSIS — H93.11 TINNITUS, RIGHT: ICD-10-CM

## 2021-12-13 DIAGNOSIS — H91.93 DECREASED HEARING OF BOTH EARS: ICD-10-CM

## 2021-12-13 DIAGNOSIS — H90.A22 SENSORINEURAL HEARING LOSS (SNHL) OF LEFT EAR WITH RESTRICTED HEARING OF RIGHT EAR: ICD-10-CM

## 2021-12-13 PROCEDURE — 92557 COMPREHENSIVE HEARING TEST: CPT | Performed by: AUDIOLOGIST

## 2021-12-13 PROCEDURE — 92550 TYMPANOMETRY & REFLEX THRESH: CPT | Performed by: AUDIOLOGIST

## 2021-12-13 PROCEDURE — 92504 EAR MICROSCOPY EXAMINATION: CPT | Performed by: OTOLARYNGOLOGY

## 2021-12-13 PROCEDURE — 99213 OFFICE O/P EST LOW 20 MIN: CPT | Mod: 25 | Performed by: OTOLARYNGOLOGY

## 2021-12-13 ASSESSMENT — PAIN SCALES - GENERAL: PAINLEVEL: MILD PAIN (2)

## 2021-12-13 ASSESSMENT — MIFFLIN-ST. JEOR: SCORE: 1343.39

## 2021-12-13 NOTE — PROGRESS NOTES
Audiology Evaluation Completed. Please refer SCANNED AUDIOGRAM and/or TYMPANOGRAM for BACKGROUND, RESULTS, RECOMMENDATIONS.      Dottie BARNES, [facility]  Audiologist #[id number]

## 2021-12-13 NOTE — LETTER
12/13/2021         RE: Rakel Mckay  [address] [place] [address]        Dear Colleague,    Thank you for referring your patient, Rakel Mckay, to the [facility]. Please see a copy of my visit note below.    Otolaryngology Progress Note          Rakel Mckay is a 56 year old female    Follow-up of a right mixed hearing loss and a right tympanic membrane perforation.      She is noticing worsening right tinnitus  Some increased life stressors    She denies chronic otorrhea, vertigo, worsening HL or flux HL  Right ear feels full chronically  She has having some difficulty communicating with mask wearing    Significant history of right ossicular reconstruction as a teenager and childhood chronic otitis media    I last saw her in 2019 and noted a right posterior microperforation which was pinpoint without evidence of prosthesis extrusion    Audiogram dated 11/25/2019 showed normal thresholds sloping to a right worse than left moderate to moderate to severe high-frequency sensorineural hearing loss with a mixed right loss no obvious concerning asymmetrical sensorineural hearing loss   large volume B tympanogram on the right  Ad left  100% discrimination bilaaterally   SRT 20 dB right 10 dB left     Audiogram today's date shows a slight worsening of the right high-frequency conductive hearing loss discrimination on the right is now 92%.  Thresholds are with and 10 dB of previous audiogram shows a right moderate sloping to moderate to severe mixed loss left normal thresholds with a moderate high-frequency sensorineural hearing loss    I saw her in 2019 for right tinnitus.  At that time there were no concerns for fluctuating hearing loss vertigo otorrhea or otalgia.    She had seen Dr. Duong in the past and was diagnosed with TMJ she has a history of migraines      Physical Exam  /70 (BP Location: Left arm, Cuff Size: Adult Regular)   Pulse 64   Temp 97.5  F (36.4  C)  "(Tympanic)   Ht 1.702 m (5' 7\")   Wt 72.6 kg (160 lb)   LMP  (LMP Unknown)   SpO2 98%   BMI 25.06 kg/m    General - The patient is well nourished and well developed, and appears to have good nutritional status.  Alert and oriented to person and place, interactive.  Head and Face - Normocephalic and atraumatic, with no gross asymmetry noted of the contour of the facial features.  The facial nerve is intact, with strong symmetric movements.  Neck-no palpable lymphadenopathy or thyroid mass.  Trachea is midline.  Eyes - Extraocular movements intact.   Ears- examined under microscopy bilaterally  External auditory canals are patent, tympanic membranes are intact without effusion or worrisome retractions   No evidence of prosthesis extrusion on the right   Mild anterior superior retraction right  Nose - Nasal mucosa is pink and moist with no abnormal mucus.  The septum was grossly midline and non-obstructive, turbinates of normal size and position.  No polyps, masses, or purulence noted on examination.  Mouth - Examination of the oral cavity shows pink, healthy, moist mucosa.  No lesions or ulceration noted.  The dentition are in good repair.  The tongue is mobile and midline.  Throat - The walls of the oropharynx were smooth, pink, moist, symmetric, and had no lesions or ulcerations.  The tonsillar pillars and soft palate were symmetric.  The uvula was midline on elevation.      Impression/Plan  Rakel Mckay is a 56 year old female    ICD-10-CM    1. Mixed conductive and sensorineural hearing loss of right ear with restricted hearing of left ear  H90.A31 Adult Audiology Referral   2. Tinnitus, right  H93.11    3. History of ear surgery  Z98.890          Annual audiogram and follow up with Chacha  You are medically cleared for hearing aids  Ears look good    Tinnitus education was provided.  Tinnitus is widely considered a disorder of cental auditory processing.    Hearing preservation was reinforced  I also " cautioned the patient against investing in any oral supplements advertised to cure tinnitus.     I have also recommended yearly audiograms, masking devices or apps.  For worsening symptoms, I recommend online or in person cognitive behavioral therapy (CBT) referral.     The patient will follow up as necessary for worsening symptoms or changes in symptoms.            Kathryn Patel D.O.  Otolaryngology/Head and Neck Surgery  Allergy              Again, thank you for allowing me to participate in the care of your patient.        Sincerely,        Kathryn Patel MD

## 2021-12-13 NOTE — NURSING NOTE
"Chief Complaint   Patient presents with     Consult     Decreased Hearing; Self Referral       Initial /70 (BP Location: Left arm, Cuff Size: Adult Regular)   Pulse 64   Temp 97.5  F (36.4  C) (Tympanic)   Ht 1.702 m (5' 7\")   Wt 72.6 kg (160 lb)   LMP  (LMP Unknown)   SpO2 98%   BMI 25.06 kg/m   Estimated body mass index is 25.06 kg/m  as calculated from the following:    Height as of this encounter: 1.702 m (5' 7\").    Weight as of this encounter: 72.6 kg (160 lb).  Medication Reconciliation: complete  Justa Yepez LPN    "

## 2021-12-13 NOTE — PATIENT INSTRUCTIONS
Thank you for allowing Dr. Patel and our ENT team to participate in your care.  If your medications are too expensive, please give the nurse a call.  We can possibly change this medication.  If you have a scheduling or an appointment question please contact our Health Unit Coordinator at their direct line [phone] ext [phone].   ALL nursing questions or concerns can be directed to your ENT nurse at: [phone] Camila Finn    Consider tinnitus center referral  Annual audiogram and follow up with Chacha  You are medically cleared for hearing aids  Ears look good        Tinnitus education was provided.  Tinnitus is widely considered a disorder of cental auditory processing.    Hearing preservation was reinforced  I also cautioned the patient against investing in any oral supplements advertised to cure tinnitus.      I have also recommended yearly audiograms, masking devices or apps.  For worsening symptoms, I recommend online or in person cognitive behavioral therapy (CBT) referral.

## 2021-12-27 DIAGNOSIS — G44.209 TENSION HEADACHE: ICD-10-CM

## 2021-12-27 RX ORDER — CYCLOBENZAPRINE HCL 10 MG
10 TABLET ORAL
Qty: 20 TABLET | Refills: 0 | Status: SHIPPED | OUTPATIENT
Start: 2021-12-27 | End: 2022-10-24

## 2021-12-27 NOTE — TELEPHONE ENCOUNTER
[facility] sent Rx request for the following:      cyclobenzaprine 10 mg tablet  Sig: Take 1 tablet (10 mg) by mouth nightly as needed for muscle spasms      Last Prescription Date:   10/1/2021  Last Fill Qty/Refills:         20, R-0    Last Office Visit:              11/15/2021   Future Office visit:           none    Routing refill request to provider for review/approval because:  Drug not on the [facility] [facility] or [facility] refill protocol or controlled substance    Unable to complete prescription refill per RN Medication Refill Policy.................... Luis Robertson RN ....................  12/27/2021   8:36 AM

## 2022-06-27 ENCOUNTER — OFFICE VISIT (OUTPATIENT)
Dept: FAMILY MEDICINE | Facility: OTHER | Age: 57
End: 2022-06-27
Attending: PHYSICIAN ASSISTANT
Payer: COMMERCIAL

## 2022-06-27 VITALS
RESPIRATION RATE: 14 BRPM | DIASTOLIC BLOOD PRESSURE: 72 MMHG | TEMPERATURE: 96.8 F | OXYGEN SATURATION: 98 % | HEIGHT: 67 IN | HEART RATE: 63 BPM | BODY MASS INDEX: 25.87 KG/M2 | WEIGHT: 164.8 LBS | SYSTOLIC BLOOD PRESSURE: 126 MMHG

## 2022-06-27 DIAGNOSIS — R14.3 FLATULENCE: ICD-10-CM

## 2022-06-27 DIAGNOSIS — D18.01 CHERRY ANGIOMA: ICD-10-CM

## 2022-06-27 DIAGNOSIS — R14.0 BLOATING: Primary | ICD-10-CM

## 2022-06-27 PROCEDURE — 99214 OFFICE O/P EST MOD 30 MIN: CPT | Performed by: PHYSICIAN ASSISTANT

## 2022-06-27 ASSESSMENT — PAIN SCALES - GENERAL: PAINLEVEL: NO PAIN (0)

## 2022-06-27 NOTE — NURSING NOTE
"Patient presents to clinic with flatulence, abdominal cramping and constipation x \"months ago\". She did have a BM yesterday. She also has a blood blister on right thigh and she would like to have it removed, she has had it years.  Tigist Aguilar, GUIDO ....................  6/27/2022   8:02 AM      "

## 2022-06-30 ENCOUNTER — HOSPITAL ENCOUNTER (OUTPATIENT)
Dept: MAMMOGRAPHY | Facility: OTHER | Age: 57
Discharge: HOME | End: 2022-06-30
Attending: PHYSICIAN ASSISTANT | Admitting: PHYSICIAN ASSISTANT
Payer: COMMERCIAL

## 2022-06-30 DIAGNOSIS — Z12.31 VISIT FOR SCREENING MAMMOGRAM: ICD-10-CM

## 2022-06-30 PROCEDURE — 77067 SCR MAMMO BI INCL CAD: CPT

## 2022-08-18 ENCOUNTER — MYC MEDICAL ADVICE (OUTPATIENT)
Dept: FAMILY MEDICINE | Facility: OTHER | Age: 57
End: 2022-08-18

## 2022-09-17 ENCOUNTER — HEALTH MAINTENANCE LETTER (OUTPATIENT)
Age: 57
End: 2022-09-17

## 2022-10-21 NOTE — PROGRESS NOTES
SUBJECTIVE:   CC: Rakel is an 57 year old who presents for preventive health visit.     Healthy Habits:     Getting at least 3 servings of Calcium per day:  NO    Bi-annual eye exam:  Yes    Dental care twice a year:  Yes    Sleep apnea or symptoms of sleep apnea:  Daytime drowsiness    Diet:  Regular (no restrictions)    Frequency of exercise:  None    Taking medications regularly:  Yes    Medication side effects:  Not applicable    PHQ-2 Total Score: 0    Additional concerns today:  No    Here for annual physical.  Needing refills of Flexeril for tension headaches.  Uses as needed, doing well with current medication and dose.    Patient also reports an on and off history of left breast tenderness, associated chest discomfort radiating up into neck, bilateral upper extremities. Mammogram updated in June and was normal. Does have a family history of heart disease, mother had early onset MI.  Also has a strong family history of thyroid disorders.  Lipid panel mildly elevated when checked last year.  No associated chest pressure, palpitations, syncope, vision changes, shortness of breath.    Has also been struggling with decreased hearing ability on right.  Longstanding history of tinnitus on right.  Previously followed with ENT reports she did qualify for hearing aids.  Patient feels like she may be at the point she would like to try this.    Contraception: Postmenopausal  Risk for STI?:  No  Last pap: 01/2018, NIL, HPV-  Any hx of abnormal paps: No  Cholesterol/DM concerns/screening: We will complete today  Tobacco?: None  Calcium intake: Dietary  Last mammo: 06/2022  Colonoscopy: 11/2014, 10 year follow up  Immunizations: Influenza, Zoster- declines both      Today's PHQ-2 Score:   PHQ-2 ( 1999 Pf[employer]) 10/24/2022   Q1: Little interest or pleasure in doing things 0   Q2: Feeling down, depressed or hopeless 0   PHQ-2 Score 0   PHQ-2 Total Score (12-17 Years)- Positive if 3 or more points; Administer PHQ-A if  positive -   Q1: Little interest or pleasure in doing things Not at all   Q2: Feeling down, depressed or hopeless Not at all   PHQ-2 Score 0         Social History     Tobacco Use     Smoking status: Never     Smokeless tobacco: Never   Substance Use Topics     Alcohol use: Not Currently     Alcohol/week: 0.0 standard drinks     Comment: rare       Reviewed orders with patient.  Reviewed health maintenance and updated orders accordingly - Yes      Breast Cancer Screening:  Any new diagnosis of family breast, ovarian, or bowel cancer? No    FHS-7:   Breast CA Risk Assessment (FHS-7) 11/19/2021   Did any of your first-degree relatives have breast or ovarian cancer? No   Did any of your relatives have bilateral breast cancer? No   Did any man in your family have breast cancer? No   Did any woman in your family have breast and ovarian cancer? No   Did any woman in your family have breast cancer before age 50 y? No   Do you have 2 or more relatives with breast and/or ovarian cancer? No   Do you have 2 or more relatives with breast and/or bowel cancer? No       Mammogram Screening: Recommended mammography every 1-2 years with patient discussion and risk factor consideration  Pertinent mammograms are reviewed under the imaging tab.    History of abnormal Pap smear: Last 3 Pap and HPV Results:       Reviewed and updated as needed this visit by clinical staff   Tobacco  Allergies  Meds      Soc Hx        Reviewed and updated as needed this visit by Provider                 Past Medical History:   Diagnosis Date     Diffuse cystic mastopathy of breast     No Comments Provided     Encounter for screening for malignant neoplasm of colon     11/11/2014     Female infertility     Secondary to Chlamydia and pelvic infections     Other chest pain     No Comments Provided     Other specified postprocedural states     12/8/2016     Personal history of other mental and behavioral disorders     with fatigue     Superficial foreign  body of right foot     2016      Past Surgical History:   Procedure Laterality Date     COLONOSCOPY  2014    Normal 10 year follow up     Pyloric stenosis      Operated in infancy     Removal of foreign body from foot Right 2016     Salpingostomies       [place]     TONSILLECTOMY       TYMPANOSTOMY, LOCAL/TOPICAL ANESTHESIA      Bilateral TM perforations and tympanostomies in childhood     OB History    Para Term  AB Living   1 1 1 0 0 1   SAB IAB Ectopic Multiple Live Births   0 0 0 0 1      # Outcome Date GA Lbr [place]/2nd Weight Sex Delivery Anes PTL Lv   1 Term 01    F Vag-Vacuum   JOSE       Review of Systems   Constitutional: Negative for chills and fever.   HENT: Positive for hearing loss. Negative for congestion, ear pain and sore throat.    Eyes: Negative for pain and visual disturbance.   Respiratory: Negative for cough and shortness of breath.    Cardiovascular: Positive for chest pain. Negative for palpitations and peripheral edema.   Gastrointestinal: Positive for heartburn. Negative for abdominal pain, constipation, diarrhea, hematochezia and nausea.   Breasts:  Positive for tenderness. Negative for breast mass and discharge.   Genitourinary: Positive for urgency. Negative for dysuria, frequency, genital sores, hematuria, pelvic pain, vaginal bleeding and vaginal discharge.   Musculoskeletal: Negative for arthralgias, joint swelling and myalgias.   Skin: Negative for rash.   Neurological: Negative for dizziness, weakness, headaches and paresthesias.   Psychiatric/Behavioral: Negative for mood changes. The patient is not nervous/anxious.      CONSTITUTIONAL: NEGATIVE for fever, chills, change in weight  INTEGUMENTARY/SKIN: NEGATIVE for worrisome rashes, moles or lesions  EYES: NEGATIVE for vision changes or irritation  ENT: NEGATIVE for ear, mouth and throat problems  RESP: NEGATIVE for significant cough or SOB  BREAST: continued left breast tenderness  CV:  "POSITIVE for chest and neck discomfort  GI: NEGATIVE for nausea, abdominal pain, heartburn, or change in bowel habits  : NEGATIVE for unusual urinary or vaginal symptoms. No vaginal bleeding.  MUSCULOSKELETAL: NEGATIVE for significant arthralgias or myalgia  NEURO: NEGATIVE for weakness, dizziness or paresthesias  PSYCHIATRIC: NEGATIVE for changes in mood or affect      OBJECTIVE:   /76   Pulse 56   Temp 97.3  F (36.3  C)   Resp 12   Ht 1.67 m (5' 5.75\")   Wt 74.8 kg (164 lb 12.8 oz)   LMP  (LMP Unknown)   SpO2 100%   BMI 26.80 kg/m    Physical Exam  GENERAL APPEARANCE: healthy, alert and no distress  EYES: Eyes grossly normal to inspection, PERRL and conjunctivae and sclerae normal  HENT: ear canals and TM's normal, nose and mouth without ulcers or lesions, oropharynx clear and oral mucous membranes moist  NECK: no adenopathy, no asymmetry, masses, or scars and thyroid normal to palpation  RESP: lungs clear to auscultation - no rales, rhonchi or wheezes  BREAST: normal without masses, tenderness or nipple discharge and no palpable axillary masses or adenopathy  CV: regular rate and rhythm, normal S1 S2, no S3 or S4, no murmur, click or rub, no peripheral edema and peripheral pulses strong  ABDOMEN: soft, nontender, no hepatosplenomegaly, no masses and bowel sounds normal  MS: no musculoskeletal defects are noted and gait is age appropriate without ataxia  SKIN: no suspicious lesions or rashes  NEURO: Normal strength and tone, sensory exam grossly normal, mentation intact and speech normal  PSYCH: mentation appears normal and affect normal/bright    Diagnostic Test Results:  Labs reviewed in Epic    ASSESSMENT/PLAN:       ICD-10-CM    1. Routine history and physical examination of adult  Z00.00 CBC and Differential     Basic Metabolic Panel     Lipid Panel     TSH Reflex GH      2. Tension headache  G44.209 cyclobenzaprine (FLEXERIL) 10 MG tablet      3. Chest discomfort  R07.89 EKG 12-lead, tracing " "only      4. Decreased hearing, right  H91.91       5. Tinnitus, right  H93.11         1.  Up-to-date on preventative exams, due for Pap smear in January 2023.  Declined updating influenza and zoster immunization, otherwise up-to-date.  Basic lab work as above.  Encourage healthy diet and exercise.    2.  Stable.  Refilled Flexeril as above.    3.  Differential includes related to breast discomfort, cardiac cause, pulmonary cause, anxiety, etc.  Vitals and exam stable.  EKG showing NSR, no ST or T wave changes. Lab work pending as above, will notify with results and treat if indicated.  If symptoms persist can consider additional cardiac evaluation with stress test and/or echocardiogram.    4, 5.  Recommend following up with audiology for repeat hearing testing consideration of hearing aids.      COUNSELING:  Reviewed preventive health counseling, as reflected in patient instructions    Estimated body mass index is 26.8 kg/m  as calculated from the following:    Height as of this encounter: 1.67 m (5' 5.75\").    Weight as of this encounter: 74.8 kg (164 lb 12.8 oz).    Weight management plan: Discussed healthy diet and exercise guidelines    She reports that she has never smoked. She has never used smokeless tobacco.      Counseling Resources:  ATP IV Guidelines  Pooled Cohorts Equation Calculator  Breast Cancer Risk Calculator  BRCA-Related Cancer Risk Assessment: FHS-7 Tool  FRAX Risk Assessment  ICSI Preventive Guidelines  Dietary Guidelines for Americans, 2010  USDA's MyPlate  ASA Prophylaxis  Lung CA Screening    Es Burgess PA-C  [facility]  "

## 2022-10-24 ENCOUNTER — OFFICE VISIT (OUTPATIENT)
Dept: FAMILY MEDICINE | Facility: OTHER | Age: 57
End: 2022-10-24
Attending: PHYSICIAN ASSISTANT
Payer: COMMERCIAL

## 2022-10-24 VITALS
RESPIRATION RATE: 12 BRPM | TEMPERATURE: 97.3 F | SYSTOLIC BLOOD PRESSURE: 132 MMHG | WEIGHT: 164.8 LBS | OXYGEN SATURATION: 100 % | HEART RATE: 56 BPM | DIASTOLIC BLOOD PRESSURE: 76 MMHG | HEIGHT: 66 IN | BODY MASS INDEX: 26.48 KG/M2

## 2022-10-24 DIAGNOSIS — R07.89 CHEST DISCOMFORT: ICD-10-CM

## 2022-10-24 DIAGNOSIS — Z00.00 ROUTINE HISTORY AND PHYSICAL EXAMINATION OF ADULT: Primary | ICD-10-CM

## 2022-10-24 DIAGNOSIS — H91.91 DECREASED HEARING, RIGHT: ICD-10-CM

## 2022-10-24 DIAGNOSIS — H93.11 TINNITUS, RIGHT: ICD-10-CM

## 2022-10-24 DIAGNOSIS — G44.209 TENSION HEADACHE: ICD-10-CM

## 2022-10-24 LAB
ANION GAP SERPL CALCULATED.3IONS-SCNC: 8 MMOL/L (ref 3–14)
BASOPHILS # BLD AUTO: 0 10E3/UL (ref 0–0.2)
BASOPHILS NFR BLD AUTO: 0 %
BUN SERPL-MCNC: 20 MG/DL (ref 7–25)
CALCIUM SERPL-MCNC: 10 MG/DL (ref 8.6–10.3)
CHLORIDE BLD-SCNC: 102 MMOL/L (ref 98–107)
CHOLEST SERPL-MCNC: 224 MG/DL
CO2 SERPL-SCNC: 29 MMOL/L (ref 21–31)
CREAT SERPL-MCNC: 0.86 MG/DL (ref 0.6–1.2)
EOSINOPHIL # BLD AUTO: 0 10E3/UL (ref 0–0.7)
EOSINOPHIL NFR BLD AUTO: 1 %
ERYTHROCYTE [DISTWIDTH] IN BLOOD BY AUTOMATED COUNT: 12.3 % (ref 10–15)
FASTING STATUS PATIENT QL REPORTED: ABNORMAL
GFR SERPL CREATININE-BSD FRML MDRD: 78 ML/MIN/1.73M2
GLUCOSE BLD-MCNC: 92 MG/DL (ref 70–105)
HCT VFR BLD AUTO: 41.9 % (ref 35–47)
HDLC SERPL-MCNC: 49 MG/DL (ref 23–92)
HGB BLD-MCNC: 14 G/DL (ref 11.7–15.7)
IMM GRANULOCYTES # BLD: 0 10E3/UL
IMM GRANULOCYTES NFR BLD: 0 %
LDLC SERPL CALC-MCNC: 155 MG/DL
LYMPHOCYTES # BLD AUTO: 2.7 10E3/UL (ref 0.8–5.3)
LYMPHOCYTES NFR BLD AUTO: 37 %
MCH RBC QN AUTO: 29 PG (ref 26.5–33)
MCHC RBC AUTO-ENTMCNC: 33.4 G/DL (ref 31.5–36.5)
MCV RBC AUTO: 87 FL (ref 78–100)
MONOCYTES # BLD AUTO: 0.5 10E3/UL (ref 0–1.3)
MONOCYTES NFR BLD AUTO: 7 %
NEUTROPHILS # BLD AUTO: 4.1 10E3/UL (ref 1.6–8.3)
NEUTROPHILS NFR BLD AUTO: 55 %
NONHDLC SERPL-MCNC: 175 MG/DL
NRBC # BLD AUTO: 0 10E3/UL
NRBC BLD AUTO-RTO: 0 /100
PLATELET # BLD AUTO: 296 10E3/UL (ref 150–450)
POTASSIUM BLD-SCNC: 4 MMOL/L (ref 3.5–5.1)
RBC # BLD AUTO: 4.83 10E6/UL (ref 3.8–5.2)
SODIUM SERPL-SCNC: 139 MMOL/L (ref 134–144)
TRIGL SERPL-MCNC: 101 MG/DL
TSH SERPL DL<=0.005 MIU/L-ACNC: 2.76 MU/L (ref 0.4–4)
WBC # BLD AUTO: 7.4 10E3/UL (ref 4–11)

## 2022-10-24 PROCEDURE — 84443 ASSAY THYROID STIM HORMONE: CPT | Mod: ZL | Performed by: PHYSICIAN ASSISTANT

## 2022-10-24 PROCEDURE — 99213 OFFICE O/P EST LOW 20 MIN: CPT | Mod: 25 | Performed by: PHYSICIAN ASSISTANT

## 2022-10-24 PROCEDURE — 80061 LIPID PANEL: CPT | Mod: ZL | Performed by: PHYSICIAN ASSISTANT

## 2022-10-24 PROCEDURE — 99396 PREV VISIT EST AGE 40-64: CPT | Performed by: PHYSICIAN ASSISTANT

## 2022-10-24 PROCEDURE — 80048 BASIC METABOLIC PNL TOTAL CA: CPT | Mod: ZL | Performed by: PHYSICIAN ASSISTANT

## 2022-10-24 PROCEDURE — 36415 COLL VENOUS BLD VENIPUNCTURE: CPT | Mod: ZL | Performed by: PHYSICIAN ASSISTANT

## 2022-10-24 PROCEDURE — 85025 COMPLETE CBC W/AUTO DIFF WBC: CPT | Mod: ZL | Performed by: PHYSICIAN ASSISTANT

## 2022-10-24 PROCEDURE — 93000 ELECTROCARDIOGRAM COMPLETE: CPT | Performed by: INTERNAL MEDICINE

## 2022-10-24 RX ORDER — CYCLOBENZAPRINE HCL 10 MG
10 TABLET ORAL
Qty: 20 TABLET | Refills: 3 | Status: SHIPPED | OUTPATIENT
Start: 2022-10-24

## 2022-10-24 ASSESSMENT — ENCOUNTER SYMPTOMS
NAUSEA: 0
JOINT SWELLING: 0
COUGH: 0
BREAST MASS: 0
ARTHRALGIAS: 0
WEAKNESS: 0
SORE THROAT: 0
FEVER: 0
HEMATOCHEZIA: 0
HEADACHES: 0
PARESTHESIAS: 0
CONSTIPATION: 0
NERVOUS/ANXIOUS: 0
DIARRHEA: 0
MYALGIAS: 0
EYE PAIN: 0
ABDOMINAL PAIN: 0
HEMATURIA: 0
DYSURIA: 0
CHILLS: 0
DIZZINESS: 0
HEARTBURN: 1
PALPITATIONS: 0
FREQUENCY: 0
SHORTNESS OF BREATH: 0

## 2022-10-24 ASSESSMENT — PAIN SCALES - GENERAL: PAINLEVEL: MILD PAIN (2)

## 2022-10-24 NOTE — NURSING NOTE
Patient presents to clinic for annual physical.  Tigist Aguilar LPN ....................  10/24/2022   1:43 PM

## 2022-10-25 LAB
ATRIAL RATE - MUSE: 65 BPM
DIASTOLIC BLOOD PRESSURE - MUSE: NORMAL MMHG
INTERPRETATION ECG - MUSE: NORMAL
P AXIS - MUSE: 34 DEGREES
PR INTERVAL - MUSE: 160 MS
QRS DURATION - MUSE: 82 MS
QT - MUSE: 404 MS
QTC - MUSE: 420 MS
R AXIS - MUSE: 21 DEGREES
SYSTOLIC BLOOD PRESSURE - MUSE: NORMAL MMHG
T AXIS - MUSE: 28 DEGREES
VENTRICULAR RATE- MUSE: 65 BPM

## 2023-03-15 NOTE — PROGRESS NOTES
Assessment & Plan     1. Acute pain of right shoulder  Symptoms and exam consistent with impingement of rotator cuff. Encourage symptomatic management with OTC analgesic, icing, activity as tolerated. Given AAOS packet of at home stretches and exercises. If minimal improvement in the coming weeks consider imaging, PT, injection.     2. Breast pain, left  Irritation and warm sensation to lateral left breast. No visible or palpable abnormalities noted on exam. Offered diagnostic imaging, patient prefers to monitor. Due for screening mammogram in June. Follow up sooner if needed.     Return if symptoms worsen or fail to improve.    Es Burgess PA-C  [facility]    Lenny Ellington is a 57 year old, presenting for the following health issues:  Shoulder Pain      History of Present Illness       Reason for visit:  Shoulder pain, armpit pain, muscle pain  Symptom onset:  3-4 weeks ago  Symptoms include:  Pain  Symptom intensity:  Moderate  Symptom progression:  Staying the same  Had these symptoms before:  No    She eats 2-3 servings of fruits and vegetables daily.She consumes 0 sweetened beverage(s) daily.She exercises with enough effort to increase her heart rate 9 or less minutes per day.  She exercises with enough effort to increase her heart rate 3 or less days per week.   She is taking medications regularly.     Here for evaluation of concerns as below.     Right shoulder:  Struggling with right shoulder pain mainly with overhead range of motion for the past 2 weeks.  No known injury.  Seems to be somewhat improved today.  Managing with Tylenol as needed.  No associated numbness or tingling, weakness, radiation to neck or down arm.  Has noticed some bilateral armpit pains as well.    Left breast:  Noticing a warm sensation to the left side of her breast.  Does not feel warm to touch, rather feels warm on the inside.  Has not noticed any overlying skin changes, nipple retraction or  discharge, dimpling.  Mammogram completed in June 2022 and was negative.  Has not felt any abnormal lumps bilaterally.  Has been working to find a different bra to help support her breast better, does not wear any underwear.        PAST MEDICAL HISTORY:   Past Medical History:   Diagnosis Date     Diffuse cystic mastopathy of breast     No Comments Provided     Encounter for screening for malignant neoplasm of colon     11/11/2014     Female infertility     Secondary to Chlamydia and pelvic infections     Other chest pain     No Comments Provided     Other specified postprocedural states     12/8/2016     Personal history of other mental and behavioral disorders     with fatigue     Superficial foreign body of right foot     11/29/2016       PAST SURGICAL HISTORY:   Past Surgical History:   Procedure Laterality Date     COLONOSCOPY  11/13/2014    Normal 10 year follow up     Pyloric stenosis      Operated in infancy     Removal of foreign body from foot Right 12/08/2016     Salpingostomies  1997     [place], [place]     TONSILLECTOMY       TYMPANOSTOMY, LOCAL/TOPICAL ANESTHESIA      Bilateral TM perforations and tympanostomies in childhood       FAMILY HISTORY:   Family History   Problem Relation Age of Onset     Heart Disease Mother               Cancer Mother         Cancer, lung cancer     Chronic Obstructive Pulmonary Disease Mother      Hypothyroidism Father      Prostate Cancer Paternal Grandfather         Cancer-prostate     Neuropathy Brother      Hypothyroidism Brother      Breast Cancer No family hx of         Cancer-breast       SOCIAL HISTORY:   Social History     Tobacco Use     Smoking status: Never     Smokeless tobacco: Never   Substance Use Topics     Alcohol use: Not Currently     Alcohol/week: 0.0 standard drinks     Comment: rare      No Known Allergies  Current Outpatient Medications   Medication     cyclobenzaprine (FLEXERIL) 10 MG tablet     No current facility-administered medications for this  "visit.         Review of Systems   Per HPI        Objective    /78   Pulse 64   Temp 97.3  F (36.3  C)   Resp 14   Ht 1.67 m (5' 5.75\")   Wt 75.5 kg (166 lb 6.4 oz)   LMP  (LMP Unknown)   SpO2 98%   BMI 27.06 kg/m    Body mass index is 27.06 kg/m .  Physical Exam   General: Pleasant, in no apparent distress.  Breast: No overlying skin changes, nipple retraction or discharge, dimpling.  No palpable abnormalities bilaterally, no palpable lumps.  No palpable axillary lymphadenopathy bilaterally.  Musculoskeletal: Minimal tenderness palpation throughout right shoulder.  Pain elicited with overhead range of motion, very mildly limited decrease in overhead range of motion on right compared to left to approximately 160 to 170 degrees.  Full range of motion cross body, posteriorly left upper extremities bilaterally.  Negative empty can test, Gordon sign bilaterally.  Negative drop arm test bilaterally.  Neurologic Exam: Normal gross motor, tone coordination and no visible tremor.  Psych: Appropriate mood and affect.          "

## 2023-03-16 ENCOUNTER — OFFICE VISIT (OUTPATIENT)
Dept: FAMILY MEDICINE | Facility: OTHER | Age: 58
End: 2023-03-16
Attending: PHYSICIAN ASSISTANT
Payer: COMMERCIAL

## 2023-03-16 VITALS
BODY MASS INDEX: 26.74 KG/M2 | HEART RATE: 64 BPM | RESPIRATION RATE: 14 BRPM | TEMPERATURE: 97.3 F | OXYGEN SATURATION: 98 % | WEIGHT: 166.4 LBS | HEIGHT: 66 IN | DIASTOLIC BLOOD PRESSURE: 78 MMHG | SYSTOLIC BLOOD PRESSURE: 126 MMHG

## 2023-03-16 DIAGNOSIS — M25.511 ACUTE PAIN OF RIGHT SHOULDER: Primary | ICD-10-CM

## 2023-03-16 DIAGNOSIS — N64.4 BREAST PAIN, LEFT: ICD-10-CM

## 2023-03-16 PROCEDURE — 99212 OFFICE O/P EST SF 10 MIN: CPT | Performed by: PHYSICIAN ASSISTANT

## 2023-03-16 ASSESSMENT — PAIN SCALES - GENERAL: PAINLEVEL: SEVERE PAIN (6)

## 2023-03-16 NOTE — NURSING NOTE
Patient presents to clinic with right shoulder pain x 2 weeks, no injury, patient states that it does feel better today and also has left breast that is warm to the touch.  Tigist Aguilar LPN ....................  3/16/2023   7:42 AM

## 2023-05-08 NOTE — PROGRESS NOTES
Nausea & Vomiting Nausea & Vomiting Nausea & Vomiting Nausea & Vomiting Patient Information     Patient Name  Rakel Mckay MRN  [id number] Sex  Female   1965      Letter by Rosa Isela Cardozo MD at      Author:  Rosa Isela Cardozo MD Service:  (none) Author Type:  (none)    Filed:   Encounter Date:  2018 Status:  (Other)           Rakel Stanleys  [id number] [address] [id number]          2018    Dear Ms. Mckay:    The result from the Pap and HPV (Human Papilloma Virus) test(s) you had done at your recent clinic visit came back as normal.     We recommend that you have an adult physical exam each year. You will not need another Pap test again for 5 years.       If you have any further questions or concerns, please call [phone]. You may also contact us by using medical messaging if you have MyChart.    Thank you for choosing [facility] to participate in your healthcare needs.    Sincerely,    Rosa Isela Cardozo MD          The [place] Screening Program is a statewide comprehensive breast and cervical cancer screening program that provides free screening and follow-up services (including colposcopy) to uninsured and underinsured women. For more information call toll free [phone] ([phone])         Nausea & Vomiting Nausea & Vomiting Nausea & Vomiting Nausea & Vomiting Nausea & Vomiting Nausea & Vomiting Nausea & Vomiting Nausea & Vomiting Nausea & Vomiting Nausea & Vomiting Nausea & Vomiting Nausea & Vomiting Nausea & Vomiting Nausea & Vomiting Nausea & Vomiting

## 2023-05-24 ENCOUNTER — NURSE TRIAGE (OUTPATIENT)
Dept: FAMILY MEDICINE | Facility: OTHER | Age: 58
End: 2023-05-24
Payer: COMMERCIAL

## 2023-05-24 NOTE — TELEPHONE ENCOUNTER
"Patient states she has already had a cardiac work-up to rule out underlying cause of chest pain, so she does not want to go to ER. The symptoms are not present now and if they were to recur, she states she would present to ER. But at this point she was hoping to get an appointment for tomorrow morning. Discussed with scheduling who will look at the same day options and seek provider permission. Cintia Fabian RN on 5/24/2023 at 2:40 PM       Reason for Disposition    Pain also in shoulder(s) or arm(s) or jaw    Additional Information    Negative: SEVERE difficulty breathing (e.g., struggling for each breath, speaks in single words)    Negative: Passed out (i.e., fainted, collapsed and was not responding)    Negative: Difficult to awaken or acting confused (e.g., disoriented, slurred speech)    Negative: Shock suspected (e.g., cold/pale/clammy skin, too weak to stand, low BP, rapid pulse)    Negative: Chest pain lasting longer than 5 minutes and ANY of the following:* Over 44 years old* Over 30 years old and at least one cardiac risk factor (e.g., diabetes mellitus, high blood pressure, high cholesterol, smoker, or strong family history of heart disease)* History of heart disease (i.e., angina, heart attack, heart failure, bypass surgery, takes nitroglycerin)* Pain is crushing, pressure-like, or heavy    Negative: Heart beating < 50 beats per minute OR > 140 beats per minute    Negative: Visible sweat on face or sweat dripping down face    Negative: Sounds like a life-threatening emergency to the triager    Negative: Followed an injury to chest    Answer Assessment - Initial Assessment Questions  1. LOCATION: \"Where does it hurt?\"        Chest, shoulder blades  2. RADIATION: \"Does the pain go anywhere else?\" (e.g., into neck, jaw, arms, back)      As above  3. ONSET: \"When did the chest pain begin?\" (Minutes, hours or days)       Two days ago  4. PATTERN \"Does the pain come and go, or has it been constant since it " "started?\"  \"Does it get worse with exertion?\"       Comes and goes  5. DURATION: \"How long does it last\" (e.g., seconds, minutes, hours)      hours  6. SEVERITY: \"How bad is the pain?\"  (e.g., Scale 1-10; mild, moderate, or severe)     - MILD (1-3): doesn't interfere with normal activities      - MODERATE (4-7): interferes with normal activities or awakens from sleep     - SEVERE (8-10): excruciating pain, unable to do any normal activities        8, sharp  7. CARDIAC RISK FACTORS: \"Do you have any history of heart problems or risk factors for heart disease?\" (e.g., angina, prior heart attack; diabetes, high blood pressure, high cholesterol, smoker, or strong family history of heart disease)      denies  8. PULMONARY RISK FACTORS: \"Do you have any history of lung disease?\"  (e.g., blood clots in lung, asthma, emphysema, birth control pills)      denies  9. CAUSE: \"What do you think is causing the chest pain?\"      unsure  10. OTHER SYMPTOMS: \"Do you have any other symptoms?\" (e.g., dizziness, nausea, vomiting, sweating, fever, difficulty breathing, cough)        denies  11. PREGNANCY: \"Is there any chance you are pregnant?\" \"When was your last menstrual period?\"        no    Protocols used: CHEST PAIN-A-[place]    "

## 2023-05-24 NOTE — TELEPHONE ENCOUNTER
Patient has been having chest pain and some jaw pain the past couple of days.    Evi Canela on 5/24/2023 at 2:24 PM

## 2023-05-30 NOTE — PROGRESS NOTES
"  Assessment & Plan     1. Cervical cancer screening  - Pap Screen Thin Prep    2. Encounter for screening mammogram for breast cancer  - MA Screen Bilateral w/Kartik; Future       BMI:   Estimated body mass index is 27.29 kg/m  as calculated from the following:    Height as of this encounter: 1.67 m (5' 5.75\").    Weight as of this encounter: 76.1 kg (167 lb 12.8 oz).         No follow-ups on file.    Es Burgess PA-C  [facility]    Lenny Ellington is a 57 year old, presenting for the following health issues:  Gyn Exam      HPI   Here for pap smear screening. Last pap completed 01/2018, NIL, HPV-. Will be due for mammogram in June.     PAST MEDICAL HISTORY:   Past Medical History:   Diagnosis Date     Diffuse cystic mastopathy of breast     No Comments Provided     Encounter for screening for malignant neoplasm of colon     11/11/2014     Female infertility     Secondary to Chlamydia and pelvic infections     Other chest pain     No Comments Provided     Other specified postprocedural states     12/8/2016     Personal history of other mental and behavioral disorders     with fatigue     Superficial foreign body of right foot     11/29/2016       PAST SURGICAL HISTORY:   Past Surgical History:   Procedure Laterality Date     COLONOSCOPY  11/13/2014    Normal 10 year follow up     Pyloric stenosis      Operated in infancy     Removal of foreign body from foot Right 12/08/2016     Salpingostomies  1997     [place]     TONSILLECTOMY       TYMPANOSTOMY, LOCAL/TOPICAL ANESTHESIA      Bilateral TM perforations and tympanostomies in childhood       FAMILY HISTORY:   Family History   Problem Relation Age of Onset     Heart Disease Mother               Cancer Mother         Cancer, lung cancer     Chronic Obstructive Pulmonary Disease Mother      Hypothyroidism Father      Prostate Cancer Paternal Grandfather         Cancer-prostate     Neuropathy Brother      Hypothyroidism Brother      " "Breast Cancer No family hx of         Cancer-breast       SOCIAL HISTORY:   Social History     Tobacco Use     Smoking status: Never     Smokeless tobacco: Never   Vaping Use     Vaping status: Never Used   Substance Use Topics     Alcohol use: Not Currently     Alcohol/week: 0.0 standard drinks of alcohol     Comment: rare      No Known Allergies  Current Outpatient Medications   Medication     cyclobenzaprine (FLEXERIL) 10 MG tablet     No current facility-administered medications for this visit.         Review of Systems   Per HPI        Objective    /76   Pulse 73   Temp 98.4  F (36.9  C)   Resp 14   Ht 1.67 m (5' 5.75\")   Wt 76.1 kg (167 lb 12.8 oz)   LMP  (LMP Unknown)   SpO2 97%   BMI 27.29 kg/m    Body mass index is 27.29 kg/m .  Physical Exam   General: Pleasant, in no apparent distress.  Genitourinary Exam Female: External genitalia, vulva and vagina are without lesions, masses, or ulcerations. Speculum exam reveals normal appearing cervix. Bimanual exam reveals normal uterus and adnexa with no masses, nontender urethra and bladder. No cervical motion tenderness. Pap smear obtained.   Psych: Appropriate mood and affect.        "

## 2023-05-31 ENCOUNTER — OFFICE VISIT (OUTPATIENT)
Dept: FAMILY MEDICINE | Facility: OTHER | Age: 58
End: 2023-05-31
Attending: PHYSICIAN ASSISTANT
Payer: COMMERCIAL

## 2023-05-31 VITALS
RESPIRATION RATE: 14 BRPM | WEIGHT: 167.8 LBS | OXYGEN SATURATION: 97 % | BODY MASS INDEX: 26.97 KG/M2 | DIASTOLIC BLOOD PRESSURE: 76 MMHG | HEART RATE: 73 BPM | HEIGHT: 66 IN | SYSTOLIC BLOOD PRESSURE: 138 MMHG | TEMPERATURE: 98.4 F

## 2023-05-31 DIAGNOSIS — Z12.4 CERVICAL CANCER SCREENING: Primary | ICD-10-CM

## 2023-05-31 DIAGNOSIS — Z12.31 ENCOUNTER FOR SCREENING MAMMOGRAM FOR BREAST CANCER: ICD-10-CM

## 2023-05-31 PROCEDURE — 87624 HPV HI-RISK TYP POOLED RSLT: CPT | Mod: ZL | Performed by: PHYSICIAN ASSISTANT

## 2023-05-31 PROCEDURE — G0123 SCREEN CERV/VAG THIN LAYER: HCPCS | Performed by: PHYSICIAN ASSISTANT

## 2023-05-31 PROCEDURE — 99213 OFFICE O/P EST LOW 20 MIN: CPT | Performed by: PHYSICIAN ASSISTANT

## 2023-05-31 ASSESSMENT — PAIN SCALES - GENERAL: PAINLEVEL: NO PAIN (0)

## 2023-05-31 NOTE — NURSING NOTE
Patient presents to clinic for pap smear as px was completed 10/2022.  Tigist Aguilar LPN ....................  5/31/2023   11:38 AM

## 2023-06-06 LAB
BKR LAB AP GYN ADEQUACY: NORMAL
BKR LAB AP GYN INTERPRETATION: NORMAL
BKR LAB AP HPV REFLEX: NORMAL
BKR LAB AP PREVIOUS ABNORMAL: NORMAL
PATH REPORT.COMMENTS IMP SPEC: NORMAL
PATH REPORT.COMMENTS IMP SPEC: NORMAL
PATH REPORT.RELEVANT HX SPEC: NORMAL

## 2023-06-08 LAB
HUMAN PAPILLOMA VIRUS 16 DNA: NEGATIVE
HUMAN PAPILLOMA VIRUS 18 DNA: NEGATIVE
HUMAN PAPILLOMA VIRUS FINAL DIAGNOSIS: NORMAL
HUMAN PAPILLOMA VIRUS OTHER HR: NEGATIVE

## 2023-07-03 ENCOUNTER — HOSPITAL ENCOUNTER (OUTPATIENT)
Dept: MAMMOGRAPHY | Facility: OTHER | Age: 58
Discharge: HOME | End: 2023-07-03
Attending: PHYSICIAN ASSISTANT | Admitting: PHYSICIAN ASSISTANT
Payer: COMMERCIAL

## 2023-07-03 DIAGNOSIS — Z12.31 ENCOUNTER FOR SCREENING MAMMOGRAM FOR BREAST CANCER: ICD-10-CM

## 2023-07-03 PROCEDURE — 77067 SCR MAMMO BI INCL CAD: CPT

## 2024-01-02 ENCOUNTER — TELEPHONE (OUTPATIENT)
Dept: OTOLARYNGOLOGY | Facility: OTHER | Age: 59
End: 2024-01-02

## 2024-01-02 DIAGNOSIS — Z98.890 HISTORY OF EAR SURGERY: ICD-10-CM

## 2024-01-02 DIAGNOSIS — H93.11 TINNITUS, RIGHT: Primary | ICD-10-CM

## 2024-01-02 NOTE — TELEPHONE ENCOUNTER
Patient has been seen by Dr. Patel a couple of times in the past for a microperforation of her right TM and tinnitus. When she was seen in 2019, it was recommended that she have an MRI IAC; however, she had distant prosthesis, so she first needed to verify if the MRI could safely be done. Patient states that she can have an MRI, so she would like to pursue this due to her tinnitus is not changed in the last few years. She also is interested in pursuing hearing aids. She was medically cleared by Dr. Patel at her appointment on 12/13/21; however, she will likely need an updated clearance. Will discuss with provider and call patient back.

## 2024-01-03 DIAGNOSIS — H91.93 DECREASED HEARING OF BOTH EARS: Primary | ICD-10-CM

## 2024-01-03 NOTE — TELEPHONE ENCOUNTER
She should be seen in ENT for follow up with audiogram. Once audiogram is updated may complete HAC.   Signed MRI order. TR

## 2024-01-10 ENCOUNTER — HOSPITAL ENCOUNTER (OUTPATIENT)
Dept: MRI IMAGING | Facility: HOSPITAL | Age: 59
Discharge: HOME | End: 2024-01-10
Attending: PHYSICIAN ASSISTANT | Admitting: PHYSICIAN ASSISTANT
Payer: COMMERCIAL

## 2024-01-10 DIAGNOSIS — Z98.890 HISTORY OF EAR SURGERY: ICD-10-CM

## 2024-01-10 DIAGNOSIS — H93.11 TINNITUS, RIGHT: ICD-10-CM

## 2024-01-10 PROCEDURE — A9585 GADOBUTROL INJECTION: HCPCS | Performed by: RADIOLOGY

## 2024-01-10 PROCEDURE — 255N000002 HC RX 255 OP 636: Performed by: RADIOLOGY

## 2024-01-10 PROCEDURE — 70543 MRI ORBT/FAC/NCK W/O &W/DYE: CPT

## 2024-01-10 RX ORDER — GADOBUTROL 604.72 MG/ML
7.5 INJECTION INTRAVENOUS ONCE
Status: COMPLETED | OUTPATIENT
Start: 2024-01-10 | End: 2024-01-10

## 2024-01-10 RX ADMIN — GADOBUTROL 7.5 ML: 604.72 INJECTION INTRAVENOUS at 08:15

## 2024-03-20 NOTE — PROGRESS NOTES
Chief Complaint   Patient presents with    Ear Problem     Medical Clearance Hearing Aids       Patient returns to ENT for follow-up exam.  Patient was last seen in ENT on 12/13/2021 by Dr. Patel.  She has a history of right mixed hearing loss and right tympanic membrane perforation.  She did complete MRI of the IAC which was negative for IAC lesion or mass.  She is requesting medical clearance for hearing aids.      Significant history of right ossicular reconstruction as a teenager and childhood chronic otitis media   Dr. Patel noted a right posterior microperforation which was pinpoint without evidence of prosthesis extrusion    Patient returns to ENT for follow up exam.   She has been doing fairly well with her ears. However, she did feel hearing has decreased but audiogram was stable. Increase in tinnitus for the last 6 years. She noticed increase in tinnitus following a certain event.   Denies otalgia  Muffled hearing in right ear but at times with left.   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 Stapedectomy at the age of 17.   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   Mother has Hl w/ hx of ear surgery.     MRI of IAC negative.        Audiogram dated 11/25/2019 showed normal thresholds sloping to a right worse than left moderate to moderate to severe high-frequency sensorineural hearing loss with a mixed right loss no obvious concerning asymmetrical sensorineural hearing loss   large volume B tympanogram on the right  Ad left  100% discrimination bilaaterally   SRT 20 dB right 10 dB left      Audiogram 12/31/2021   shows a slight worsening of the right high-frequency conductive hearing loss discrimination on the right is now 92%.  Thresholds are with and 10 dB of previous audiogram shows a right moderate sloping to moderate to severe mixed loss left normal thresholds with a moderate high-frequency sensorineural hearing loss      Audiogram- 3/21/24  Type B large volume Right (perforation) and Type Ad  "left  tympanograms  Thresholds are stable normal left sloping to severe SNHL and right normal sloping to severe MHL  SRT=PTA  WRS-  Right- 100%@65 dB  Left- 100% @55 dB    Past Medical History:   Diagnosis Date    Diffuse cystic mastopathy of breast     No Comments Provided    Encounter for screening for malignant neoplasm of colon     11/11/2014    Female infertility     Secondary to Chlamydia and pelvic infections    Other chest pain     No Comments Provided    Other specified postprocedural states     12/8/2016    Personal history of other mental and behavioral disorders     with fatigue    Superficial foreign body of right foot     11/29/2016      No Known Allergies  Current Outpatient Medications   Medication    cyclobenzaprine (FLEXERIL) 10 MG tablet     No current facility-administered medications for this visit.     ROS- SEE HPI  /82 (BP Location: Right arm, Patient Position: Sitting, Cuff Size: Adult Regular)   Pulse 66   Temp 97.4  F (36.3  C) (Temporal)   Resp 18   Ht 1.67 m (5' 5.75\")   Wt 76.1 kg (167 lb 12.3 oz)   LMP  (LMP Unknown)   SpO2 98%   BMI 27.29 kg/m      General - The patient is well nourished and well developed, and appears to have good nutritional status.  Alert and oriented to person and place, interactive.  Head and Face - Normocephalic and atraumatic, with no gross asymmetry noted of the contour of the facial features.  The facial nerve is intact, with strong symmetric movements.  Neck-no palpable lymphadenopathy or thyroid mass.  Trachea is midline.  Eyes - Extraocular movements intact.   Ears- examined under microscopy bilaterally  External auditory canals are patent, tympanic membranes are intact without effusion or worrisome retractions   No evidence of prosthesis extrusion on the right   Mild anterior superior perforation right. No otorrhea.   Nose - Nasal mucosa is pink and moist with no abnormal mucus.  The septum was grossly midline and non-obstructive, turbinates " of normal size and position.  No polyps, masses, or purulence noted on examination.  Mouth - Examination of the oral cavity shows pink, healthy, moist mucosa.  No lesions or ulceration noted.  The dentition are in good repair.  The tongue is mobile and midline.  Throat - The walls of the oropharynx were smooth, pink, moist, symmetric, and had no lesions or ulcerations.  The tonsillar pillars and soft palate were symmetric.  The uvula was midline on elevation.       Impression/Plan    ICD-10-CM    1. Mixed conductive and sensorineural hearing loss of right ear with restricted hearing of left ear  H90.A31       2. Sensorineural hearing loss (SNHL) of left ear with restricted hearing of right ear  H90.A22       3. Perforation of tympanic membrane, right  H72.91       4. Tinnitus, right  H93.11       5. History of ear surgery  Z98.890           Stable ear exam  Follow up for annual ear exam, Right TM perforation  Water precautions  Medical clearance for Right HA provided.     MRI of IAC negative  Annual audiogram.    Tinnitus reviewed.     Tinnitus education was provided.  Tinnitus is widely considered a disorder of cental auditory processing.     Hearing preservation was reinforced   I also cautioned the patient against investing in any oral supplements advertised to cure tinnitus.     I have also recommended yearly audiograms, masking devices or apps.  For worsening symptoms, I recommend online or in person cognitive behavioral therapy (CBT) referral.     The patient will follow up as necessary for worsening symptoms or changes in symptoms.           Chacha Coffman PA-C  ENT  [facility]

## 2024-03-21 ENCOUNTER — OFFICE VISIT (OUTPATIENT)
Dept: AUDIOLOGY | Facility: OTHER | Age: 59
End: 2024-03-21
Attending: AUDIOLOGIST
Payer: COMMERCIAL

## 2024-03-21 ENCOUNTER — OFFICE VISIT (OUTPATIENT)
Dept: OTOLARYNGOLOGY | Facility: OTHER | Age: 59
End: 2024-03-21
Attending: PHYSICIAN ASSISTANT
Payer: COMMERCIAL

## 2024-03-21 VITALS
OXYGEN SATURATION: 98 % | TEMPERATURE: 97.4 F | WEIGHT: 167.77 LBS | HEIGHT: 66 IN | RESPIRATION RATE: 18 BRPM | HEART RATE: 66 BPM | SYSTOLIC BLOOD PRESSURE: 127 MMHG | BODY MASS INDEX: 26.96 KG/M2 | DIASTOLIC BLOOD PRESSURE: 82 MMHG

## 2024-03-21 DIAGNOSIS — Z98.890 HISTORY OF EAR SURGERY: ICD-10-CM

## 2024-03-21 DIAGNOSIS — H69.93 DYSFUNCTION OF EUSTACHIAN TUBE, BILATERAL: Primary | ICD-10-CM

## 2024-03-21 DIAGNOSIS — H93.11 TINNITUS, RIGHT: ICD-10-CM

## 2024-03-21 DIAGNOSIS — H90.A22 SENSORINEURAL HEARING LOSS (SNHL) OF LEFT EAR WITH RESTRICTED HEARING OF RIGHT EAR: ICD-10-CM

## 2024-03-21 DIAGNOSIS — H90.6 MIXED CONDUCTIVE AND SENSORINEURAL HEARING LOSS OF BOTH EARS: ICD-10-CM

## 2024-03-21 DIAGNOSIS — H72.91 PERFORATION OF TYMPANIC MEMBRANE, RIGHT: ICD-10-CM

## 2024-03-21 DIAGNOSIS — H91.93 DECREASED HEARING OF BOTH EARS: ICD-10-CM

## 2024-03-21 DIAGNOSIS — H90.A31 MIXED CONDUCTIVE AND SENSORINEURAL HEARING LOSS OF RIGHT EAR WITH RESTRICTED HEARING OF LEFT EAR: Primary | ICD-10-CM

## 2024-03-21 PROCEDURE — 92567 TYMPANOMETRY: CPT | Performed by: AUDIOLOGIST

## 2024-03-21 PROCEDURE — 92557 COMPREHENSIVE HEARING TEST: CPT | Performed by: AUDIOLOGIST

## 2024-03-21 PROCEDURE — 99213 OFFICE O/P EST LOW 20 MIN: CPT | Mod: 25 | Performed by: PHYSICIAN ASSISTANT

## 2024-03-21 PROCEDURE — 92504 EAR MICROSCOPY EXAMINATION: CPT | Performed by: PHYSICIAN ASSISTANT

## 2024-03-21 ASSESSMENT — PAIN SCALES - GENERAL: PAINLEVEL: NO PAIN (0)

## 2024-03-21 NOTE — PROGRESS NOTES
Audiology Evaluation Completed. Please refer SCANNED AUDIOGRAM and/or TYMPANOGRAM for BACKGROUND, RESULTS, RECOMMENDATIONS.      Dottie BARNES, [facility]  Audiologist #[id number]

## 2024-03-21 NOTE — PATIENT INSTRUCTIONS
Ears look stable.   Stable right TM perforation.   Strict water precautions.   Hearing test w/ stable results  Annual audiogram and ear exam  If any concerns return to ENT sooner.   Medical clearance provided for right ear.       Thank you for allowing Chacha Coffman PA-C and our ENT team to participate in your care.  If your medications are too expensive, please give the nurse a call.  We can possibly change this medication.  If you have a scheduling or an appointment question please contact our Health Unit Coordinator at [phone], Ext. [phone].    ALL nursing questions or concerns can be directed to your ENT nurse at: [phone] Jenni        Tinnitus education was provided.  Tinnitus is widely considered a disorder of cental auditory processing.     Hearing preservation was reinforced   I also cautioned the patient against investing in any oral supplements advertised to cure tinnitus.     I have also recommended yearly audiograms, masking devices or apps.  For worsening symptoms, I recommend online or in person cognitive behavioral therapy (CBT) referral.     The patient will follow up as necessary for worsening symptoms or changes in symptoms.

## 2024-03-21 NOTE — LETTER
3/21/2024         RE: Rakel Mckay  [address] [place] [address]        Dear Colleague,    Thank you for referring your patient, Rakel Mckay, to the [facility]. Please see a copy of my visit note below.    Chief Complaint   Patient presents with     Ear Problem     Medical Clearance Hearing Aids       Patient returns to ENT for follow-up exam.  Patient was last seen in ENT on 12/13/2021 by Dr. Patel.  She has a history of right mixed hearing loss and right tympanic membrane perforation.  She did complete MRI of the IAC which was negative for IAC lesion or mass.  She is requesting medical clearance for hearing aids.      Significant history of right ossicular reconstruction as a teenager and childhood chronic otitis media   Dr. Patel noted a right posterior microperforation which was pinpoint without evidence of prosthesis extrusion    Patient returns to ENT for follow up exam.   She has been doing fairly well with her ears. However, she did feel hearing has decreased but audiogram was stable. Increase in tinnitus for the last 6 years. She noticed increase in tinnitus following a certain event.   Denies otalgia  Muffled hearing in right ear but at times with left.   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 Stapedectomy at the age of 17.   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   Mother has Hl w/ hx of ear surgery.     MRI of IAC negative.        Audiogram dated 11/25/2019 showed normal thresholds sloping to a right worse than left moderate to moderate to severe high-frequency sensorineural hearing loss with a mixed right loss no obvious concerning asymmetrical sensorineural hearing loss   large volume B tympanogram on the right  Ad left  100% discrimination bilaaterally   SRT 20 dB right 10 dB left      Audiogram 12/31/2021   shows a slight worsening of the right high-frequency conductive hearing loss discrimination on the right is now 92%.  Thresholds are with and  "10 dB of previous audiogram shows a right moderate sloping to moderate to severe mixed loss left normal thresholds with a moderate high-frequency sensorineural hearing loss      Audiogram- 3/21/24  Type B large volume Right (perforation) and Type Ad left  tympanograms  Thresholds are stable normal left sloping to severe SNHL and right normal sloping to severe MHL  SRT=PTA  WRS-  Right- 100%@65 dB  Left- 100% @55 dB    Past Medical History:   Diagnosis Date     Diffuse cystic mastopathy of breast     No Comments Provided     Encounter for screening for malignant neoplasm of colon     11/11/2014     Female infertility     Secondary to Chlamydia and pelvic infections     Other chest pain     No Comments Provided     Other specified postprocedural states     12/8/2016     Personal history of other mental and behavioral disorders     with fatigue     Superficial foreign body of right foot     11/29/2016      No Known Allergies  Current Outpatient Medications   Medication     cyclobenzaprine (FLEXERIL) 10 MG tablet     No current facility-administered medications for this visit.     ROS- SEE HPI  /82 (BP Location: Right arm, Patient Position: Sitting, Cuff Size: Adult Regular)   Pulse 66   Temp 97.4  F (36.3  C) (Temporal)   Resp 18   Ht 1.67 m (5' 5.75\")   Wt 76.1 kg (167 lb 12.3 oz)   LMP  (LMP Unknown)   SpO2 98%   BMI 27.29 kg/m      General - The patient is well nourished and well developed, and appears to have good nutritional status.  Alert and oriented to person and place, interactive.  Head and Face - Normocephalic and atraumatic, with no gross asymmetry noted of the contour of the facial features.  The facial nerve is intact, with strong symmetric movements.  Neck-no palpable lymphadenopathy or thyroid mass.  Trachea is midline.  Eyes - Extraocular movements intact.   Ears- examined under microscopy bilaterally  External auditory canals are patent, tympanic membranes are intact without effusion or " worrisome retractions   No evidence of prosthesis extrusion on the right   Mild anterior superior perforation right. No otorrhea.   Nose - Nasal mucosa is pink and moist with no abnormal mucus.  The septum was grossly midline and non-obstructive, turbinates of normal size and position.  No polyps, masses, or purulence noted on examination.  Mouth - Examination of the oral cavity shows pink, healthy, moist mucosa.  No lesions or ulceration noted.  The dentition are in good repair.  The tongue is mobile and midline.  Throat - The walls of the oropharynx were smooth, pink, moist, symmetric, and had no lesions or ulcerations.  The tonsillar pillars and soft palate were symmetric.  The uvula was midline on elevation.       Impression/Plan    ICD-10-CM    1. Mixed conductive and sensorineural hearing loss of right ear with restricted hearing of left ear  H90.A31       2. Sensorineural hearing loss (SNHL) of left ear with restricted hearing of right ear  H90.A22       3. Perforation of tympanic membrane, right  H72.91       4. Tinnitus, right  H93.11       5. History of ear surgery  Z98.890           Stable ear exam  Follow up for annual ear exam, Right TM perforation  Water precautions  Medical clearance for Right HA provided.     MRI of IAC negative  Annual audiogram.    Tinnitus reviewed.     Tinnitus education was provided.  Tinnitus is widely considered a disorder of cental auditory processing.     Hearing preservation was reinforced   I also cautioned the patient against investing in any oral supplements advertised to cure tinnitus.     I have also recommended yearly audiograms, masking devices or apps.  For worsening symptoms, I recommend online or in person cognitive behavioral therapy (CBT) referral.     The patient will follow up as necessary for worsening symptoms or changes in symptoms.           Chacha Coffman PA-C  ENT  [facility], [place]            Again, thank you for allowing me to participate in the care  of your patient.        Sincerely,        Chacha Coffman PA-C

## 2024-04-26 ENCOUNTER — NURSE TRIAGE (OUTPATIENT)
Dept: FAMILY MEDICINE | Facility: OTHER | Age: 59
End: 2024-04-26
Payer: COMMERCIAL

## 2024-04-26 ENCOUNTER — HOSPITAL ENCOUNTER (EMERGENCY)
Facility: OTHER | Age: 59
Discharge: HOME | End: 2024-04-26
Attending: STUDENT IN AN ORGANIZED HEALTH CARE EDUCATION/TRAINING PROGRAM | Admitting: STUDENT IN AN ORGANIZED HEALTH CARE EDUCATION/TRAINING PROGRAM
Payer: COMMERCIAL

## 2024-04-26 ENCOUNTER — APPOINTMENT (OUTPATIENT)
Dept: GENERAL RADIOLOGY | Facility: OTHER | Age: 59
End: 2024-04-26
Attending: STUDENT IN AN ORGANIZED HEALTH CARE EDUCATION/TRAINING PROGRAM
Payer: COMMERCIAL

## 2024-04-26 VITALS
HEART RATE: 64 BPM | RESPIRATION RATE: 14 BRPM | OXYGEN SATURATION: 96 % | SYSTOLIC BLOOD PRESSURE: 124 MMHG | DIASTOLIC BLOOD PRESSURE: 78 MMHG

## 2024-04-26 DIAGNOSIS — R07.2 SUBSTERNAL CHEST PAIN: ICD-10-CM

## 2024-04-26 LAB
ALBUMIN SERPL BCG-MCNC: 4.7 G/DL (ref 3.5–5.2)
ALP SERPL-CCNC: 94 U/L (ref 40–150)
ALT SERPL W P-5'-P-CCNC: 52 U/L (ref 0–50)
ANION GAP SERPL CALCULATED.3IONS-SCNC: 11 MMOL/L (ref 7–15)
AST SERPL W P-5'-P-CCNC: 43 U/L (ref 0–45)
BASOPHILS # BLD AUTO: 0.1 10E3/UL (ref 0–0.2)
BASOPHILS NFR BLD AUTO: 1 %
BILIRUB SERPL-MCNC: 1.5 MG/DL
BUN SERPL-MCNC: 19 MG/DL (ref 8–23)
CALCIUM SERPL-MCNC: 10.3 MG/DL (ref 8.6–10)
CHLORIDE SERPL-SCNC: 104 MMOL/L (ref 98–107)
CREAT SERPL-MCNC: 0.74 MG/DL (ref 0.51–0.95)
D DIMER PPP FEU-MCNC: 0.33 UG/ML FEU (ref 0–0.5)
DEPRECATED HCO3 PLAS-SCNC: 26 MMOL/L (ref 22–29)
EGFRCR SERPLBLD CKD-EPI 2021: >90 ML/MIN/1.73M2
EOSINOPHIL # BLD AUTO: 0.1 10E3/UL (ref 0–0.7)
EOSINOPHIL NFR BLD AUTO: 1 %
ERYTHROCYTE [DISTWIDTH] IN BLOOD BY AUTOMATED COUNT: 12.7 % (ref 10–15)
GLUCOSE SERPL-MCNC: 93 MG/DL (ref 70–99)
HCT VFR BLD AUTO: 44.9 % (ref 35–47)
HGB BLD-MCNC: 14.8 G/DL (ref 11.7–15.7)
HOLD SPECIMEN: NORMAL
IMM GRANULOCYTES # BLD: 0 10E3/UL
IMM GRANULOCYTES NFR BLD: 0 %
LIPASE SERPL-CCNC: 38 U/L (ref 13–60)
LYMPHOCYTES # BLD AUTO: 2.7 10E3/UL (ref 0.8–5.3)
LYMPHOCYTES NFR BLD AUTO: 36 %
MCH RBC QN AUTO: 28.8 PG (ref 26.5–33)
MCHC RBC AUTO-ENTMCNC: 33 G/DL (ref 31.5–36.5)
MCV RBC AUTO: 88 FL (ref 78–100)
MONOCYTES # BLD AUTO: 0.5 10E3/UL (ref 0–1.3)
MONOCYTES NFR BLD AUTO: 7 %
NEUTROPHILS # BLD AUTO: 4.2 10E3/UL (ref 1.6–8.3)
NEUTROPHILS NFR BLD AUTO: 56 %
NRBC # BLD AUTO: 0 10E3/UL
NRBC BLD AUTO-RTO: 0 /100
PLATELET # BLD AUTO: 260 10E3/UL (ref 150–450)
POTASSIUM SERPL-SCNC: 4.5 MMOL/L (ref 3.4–5.3)
PROT SERPL-MCNC: 7.4 G/DL (ref 6.4–8.3)
RBC # BLD AUTO: 5.13 10E6/UL (ref 3.8–5.2)
SODIUM SERPL-SCNC: 141 MMOL/L (ref 135–145)
TROPONIN T SERPL HS-MCNC: <6 NG/L
WBC # BLD AUTO: 7.5 10E3/UL (ref 4–11)

## 2024-04-26 PROCEDURE — 250N000013 HC RX MED GY IP 250 OP 250 PS 637: Performed by: STUDENT IN AN ORGANIZED HEALTH CARE EDUCATION/TRAINING PROGRAM

## 2024-04-26 PROCEDURE — 99284 EMERGENCY DEPT VISIT MOD MDM: CPT | Performed by: STUDENT IN AN ORGANIZED HEALTH CARE EDUCATION/TRAINING PROGRAM

## 2024-04-26 PROCEDURE — 83690 ASSAY OF LIPASE: CPT | Performed by: STUDENT IN AN ORGANIZED HEALTH CARE EDUCATION/TRAINING PROGRAM

## 2024-04-26 PROCEDURE — 36415 COLL VENOUS BLD VENIPUNCTURE: CPT | Performed by: STUDENT IN AN ORGANIZED HEALTH CARE EDUCATION/TRAINING PROGRAM

## 2024-04-26 PROCEDURE — 93010 ELECTROCARDIOGRAM REPORT: CPT | Performed by: INTERNAL MEDICINE

## 2024-04-26 PROCEDURE — 71045 X-RAY EXAM CHEST 1 VIEW: CPT

## 2024-04-26 PROCEDURE — 85025 COMPLETE CBC W/AUTO DIFF WBC: CPT | Performed by: STUDENT IN AN ORGANIZED HEALTH CARE EDUCATION/TRAINING PROGRAM

## 2024-04-26 PROCEDURE — 82247 BILIRUBIN TOTAL: CPT | Performed by: STUDENT IN AN ORGANIZED HEALTH CARE EDUCATION/TRAINING PROGRAM

## 2024-04-26 PROCEDURE — 99285 EMERGENCY DEPT VISIT HI MDM: CPT | Mod: 25 | Performed by: STUDENT IN AN ORGANIZED HEALTH CARE EDUCATION/TRAINING PROGRAM

## 2024-04-26 PROCEDURE — 84484 ASSAY OF TROPONIN QUANT: CPT | Performed by: STUDENT IN AN ORGANIZED HEALTH CARE EDUCATION/TRAINING PROGRAM

## 2024-04-26 PROCEDURE — 85379 FIBRIN DEGRADATION QUANT: CPT | Performed by: STUDENT IN AN ORGANIZED HEALTH CARE EDUCATION/TRAINING PROGRAM

## 2024-04-26 PROCEDURE — 93005 ELECTROCARDIOGRAM TRACING: CPT | Performed by: STUDENT IN AN ORGANIZED HEALTH CARE EDUCATION/TRAINING PROGRAM

## 2024-04-26 RX ORDER — ASPIRIN 81 MG/1
324 TABLET, CHEWABLE ORAL ONCE
Status: COMPLETED | OUTPATIENT
Start: 2024-04-26 | End: 2024-04-26

## 2024-04-26 RX ADMIN — ASPIRIN 81 MG CHEWABLE TABLET 324 MG: 81 TABLET CHEWABLE at 09:59

## 2024-04-26 ASSESSMENT — ACTIVITIES OF DAILY LIVING (ADL)
ADLS_ACUITY_SCORE: 35

## 2024-04-26 ASSESSMENT — COLUMBIA-SUICIDE SEVERITY RATING SCALE - C-SSRS
2. HAVE YOU ACTUALLY HAD ANY THOUGHTS OF KILLING YOURSELF IN THE PAST MONTH?: NO
6. HAVE YOU EVER DONE ANYTHING, STARTED TO DO ANYTHING, OR PREPARED TO DO ANYTHING TO END YOUR LIFE?: NO
1. IN THE PAST MONTH, HAVE YOU WISHED YOU WERE DEAD OR WISHED YOU COULD GO TO SLEEP AND NOT WAKE UP?: NO

## 2024-04-26 NOTE — ED PROVIDER NOTES
History     Chief Complaint   Patient presents with    Chest Pain       Rakel Mckay is a 58 year old female presenting for chest pain.  Onset yesterday morning at 6 AM while driving in her car.  Lower substernal with radiation to her back and can radiate to her jaw.  Episodes last approximately 5-20 minutes.  Randomly occurring not associated with exertion.  Possibly associated with some lightheadedness. Denies fevers, cough, dyspnea, other pain, nausea, vomiting, diaphoresis, leg swelling, weakness, numbness.  Possibly history of some milder similar symptoms in the past.  Current episodes are associated with 8/10 severity pain.  Currently asymptomatic.  Took aspirin 325 mg this morning.      No Known Allergies    Patient Active Problem List    Diagnosis Date Noted    Tinnitus, right 05/14/2019     Priority: Medium    Angular cheilitis 12/05/2018     Priority: Medium    History of ear surgery 12/08/2016     Priority: Medium    Diffuse cystic mastopathy 06/15/2010     Priority: Medium       Past Medical History:   Diagnosis Date    Diffuse cystic mastopathy of breast     Encounter for screening for malignant neoplasm of colon     Female infertility     Other chest pain     Other specified postprocedural states     Personal history of other mental and behavioral disorders     Superficial foreign body of right foot        Past Surgical History:   Procedure Laterality Date    COLONOSCOPY  11/13/2014    Normal 10 year follow up    Pyloric stenosis      Operated in infancy    Removal of foreign body from foot Right 12/08/2016    Salpingostomies  1997     FAUSTINA Poole    TONSILLECTOMY      TYMPANOSTOMY, LOCAL/TOPICAL ANESTHESIA      Bilateral TM perforations and tympanostomies in childhood       Family History   Problem Relation Age of Onset    Heart Disease Mother              Cancer Mother         Cancer, lung cancer    Chronic Obstructive Pulmonary Disease Mother     Hypothyroidism Father     Prostate Cancer Paternal  Grandfather         Cancer-prostate    Neuropathy Brother     Hypothyroidism Brother     Breast Cancer No family hx of         Cancer-breast       Social History     Tobacco Use    Smoking status: Never    Smokeless tobacco: Never   Vaping Use    Vaping status: Never Used   Substance Use Topics    Alcohol use: Not Currently     Alcohol/week: 0.0 standard drinks of alcohol     Comment: rare    Drug use: Never       Medications:    cyclobenzaprine (FLEXERIL) 10 MG tablet        Review of Systems: See HPI for pertinent negatives and positives. All other systems reviewed and found to be negative.    Physical Exam   /77   Pulse 65   Resp 14   LMP  (LMP Unknown)   SpO2 98%      General: awake, comfortable  HEENT: atraumatic, no JVD  Respiratory: normal effort, clear to auscultation bilaterally  Cardiovascular: regular rate and rhythm, no murmurs, symmetric radial and dorsalis pedis pulses 2+  Abdomen: soft, nontender, nondistended  Extremities: no deformities, edema, tenderness  MSK: no chest wall tenderness  Skin: warm, dry, no rashes  Neuro: alert, no focal deficits    ED Course      Results for orders placed or performed during the hospital encounter of 04/26/24 (from the past 24 hour(s))   [facility] Draw    Narrative    The following orders were created for panel order [facility] Draw.  Procedure                               Abnormality         Status                     ---------                               -----------         ------                     Extra Blue Top Tube[[id number]]                              Final result               Extra Red Top Tube[[id number]]                               Final result               Extra Green Top (Lithium...[[id number]]                      Final result               Extra Purple Top Tube[[id number]]                            Final result               Extra Green Top (Lithium...[[id number]]                      Final result                 Please view results for  these tests on the individual orders.   Extra Blue Top Tube   Result Value Ref Range    Hold Specimen jic    Extra Red Top Tube   Result Value Ref Range    Hold Specimen x    Extra Green Top (Lithium Heparin) Tube   Result Value Ref Range    Hold Specimen x    Extra Purple Top Tube   Result Value Ref Range    Hold Specimen jic    Extra Green Top (Lithium Heparin) ON ICE   Result Value Ref Range    Hold Specimen x    CBC with platelets differential    Narrative    The following orders were created for panel order CBC with platelets differential.  Procedure                               Abnormality         Status                     ---------                               -----------         ------                     CBC with platelets and d...[[id number]]                      Final result                 Please view results for these tests on the individual orders.   Lipase   Result Value Ref Range    Lipase 38 13 - 60 U/L   Comprehensive metabolic panel   Result Value Ref Range    Sodium 141 135 - 145 mmol/L    Potassium 4.5 3.4 - 5.3 mmol/L    Carbon Dioxide (CO2) 26 22 - 29 mmol/L    Anion Gap 11 7 - 15 mmol/L    Urea Nitrogen 19.0 8.0 - 23.0 mg/dL    Creatinine 0.74 0.51 - 0.95 mg/dL    GFR Estimate >90 >60 mL/min/1.73m2    Calcium 10.3 (H) 8.6 - 10.0 mg/dL    Chloride 104 98 - 107 mmol/L    Glucose 93 70 - 99 mg/dL    Alkaline Phosphatase 94 40 - 150 U/L    AST 43 0 - 45 U/L    ALT 52 (H) 0 - 50 U/L    Protein Total 7.4 6.4 - 8.3 g/dL    Albumin 4.7 3.5 - 5.2 g/dL    Bilirubin Total 1.5 (H) <=1.2 mg/dL   D dimer quantitative   Result Value Ref Range    D-Dimer Quantitative 0.33 0.00 - 0.50 ug/mL FEU    Narrative    This D-dimer assay is intended for use in conjunction with a clinical pretest probability assessment model to exclude pulmonary embolism (PE) and deep venous thrombosis (DVT) in outpatients suspected of PE or DVT. The cut-off value is 0.50 ug/mL FEU.    For patients 50 years of age or older, the  application of age-adjusted cut-off values for D-Dimer may increase the specificity without significant effect on sensitivity. The literature suggested calculation age adjusted cut-off in ug/L = age in years x 10 ug/L. The results in this laboratory are reported as ug/mL rather than ug/L. The calculation for age adjusted cut off in ug/mL= age in years x 0.01 ug/mL. For example, the cut off for a 76 year old male is 76 x 0.01 ug/mL = 0.76 ug/mL (760 ug/L).    M Jonathon et al. Age adjusted D-dimer cut-off levels to rule out pulmonary embolism: The ADJUST-PE Study. YOVANA 2014;311:3227-0804.; HJ Mary Anne et al. Diagnostic accuracy of conventional or age adjusted D-dimer cutoff values in older patients with suspected venous thromboembolism. Systemic review and meta-analysis. BMJ 2013:346:f2492.   CBC with platelets and differential   Result Value Ref Range    WBC Count 7.5 4.0 - 11.0 10e3/uL    RBC Count 5.13 3.80 - 5.20 10e6/uL    Hemoglobin 14.8 11.7 - 15.7 g/dL    Hematocrit 44.9 35.0 - 47.0 %    MCV 88 78 - 100 fL    MCH 28.8 26.5 - 33.0 pg    MCHC 33.0 31.5 - 36.5 g/dL    RDW 12.7 10.0 - 15.0 %    Platelet Count 260 150 - 450 10e3/uL    % Neutrophils 56 %    % Lymphocytes 36 %    % Monocytes 7 %    % Eosinophils 1 %    % Basophils 1 %    % Immature Granulocytes 0 %    NRBCs per 100 WBC 0 <1 /100    Absolute Neutrophils 4.2 1.6 - 8.3 10e3/uL    Absolute Lymphocytes 2.7 0.8 - 5.3 10e3/uL    Absolute Monocytes 0.5 0.0 - 1.3 10e3/uL    Absolute Eosinophils 0.1 0.0 - 0.7 10e3/uL    Absolute Basophils 0.1 0.0 - 0.2 10e3/uL    Absolute Immature Granulocytes 0.0 <=0.4 10e3/uL    Absolute NRBCs 0.0 10e3/uL   XR Chest Port 1 View    Narrative    PROCEDURE:  XR CHEST PORT 1 VIEW    HISTORY: substernal CP. .    COMPARISON:  11/6/2019    FINDINGS:    The cardiomediastinal contours are stable.  No focal consolidation, effusion or pneumothorax.      Impression    IMPRESSION:  Stable chest.      ESTELLA ARCINIEGA MD         SYSTEM  ID:  [id number]   Troponin T, High Sensitivity   Result Value Ref Range    Troponin T, High Sensitivity <6 <=14 ng/L       Medications   aspirin (ASA) chewable tablet 324 mg (324 mg Oral $Given 4/26/24 0996)       Assessments & Plan (with Medical Decision Making)     I have reviewed the nursing notes.    ED Course as of 04/26/24 1212   Fri Apr 26, 2024   0946 EKG: NSR, no evidence of acute ischemia with no ST abnormalities, LBBB, I/II/V4-6 TWI, hyperacute T waves.    1117 Initial troponin hemolyzed. Repeat draw pending.          58 year old female evaluated for chest pain. Differential includes acute coronary syndrome, pulmonary embolism, aortic dissection, pneumothorax, esophageal tear. Hemodynamically stable. EKG, CXR, and laboratory evaluation were unremarkable.  Asymptomatic upon presentation and therefore not treated here in the ED be on aspirin. Given low risk with evaluation results suggesting low suspicion of a life threatening etiology, patient is stable for outpatient management with close follow up. Diagnosis unclear but possibly related to gastrointestinal etiology. Reassurance provided for unlikely immediately life-threatening serious cause for chest pain. Discharged home in stable condition with attached instructions on diagnosis including ED return precautions and instructed PCP follow up in 1-2 weeks. Discharged home in stable condition.    I have reviewed the findings, diagnosis, and plan and follow up.      New Prescriptions    No medications on file       Final diagnoses:   Substernal chest pain       4/26/2024   [facility]      Jair Alegria MD  04/26/24 1212

## 2024-04-26 NOTE — TELEPHONE ENCOUNTER
"Patient advised to present to ER now, symptoms are not present at the moment. Advised patient if symptoms were to present en route to pull over and call 911 for ambulance transport to which she states understanding. ER also advised of patient's ETA of 15 minutes. Cintia Fabian RN on 4/26/2024 at 9:11 AM      Reason for Disposition   SEVERE chest pain   Pain also in shoulder(s) or arm(s) or jaw   Difficulty breathing    Additional Information   Negative: Followed an injury to chest    Answer Assessment - Initial Assessment Questions  1. LOCATION: \"Where does it hurt?\"        Yesterday experienced chest pain, jaw pain, and difficulty breathing while she was driving. She took aspirin 81 mg. During the night she experienced transient chest pain and shortness of breath. Patient took a regular aspirin but symptoms are continuing.     2. RADIATION: \"Does the pain go anywhere else?\" (e.g., into neck, jaw, arms, back)      Jaw, back     3. ONSET: \"When did the chest pain begin?\" (Minutes, hours or days)       Yesterday, throughout the night, today     4. PATTERN: \"Does the pain come and go, or has it been constant since it started?\"  \"Does it get worse with exertion?\"       Transient     5. DURATION: \"How long does it last\" (e.g., seconds, minutes, hours)      Yesterday it lasted 20-30 minutes. This morning it's a few minutes at a time, up to 5 minutes     6. SEVERITY: \"How bad is the pain?\"  (e.g., Scale 1-10; mild, moderate, or severe)     - MILD (1-3): doesn't interfere with normal activities      - MODERATE (4-7): interferes with normal activities or awakens from sleep     - SEVERE (8-10): excruciating pain, unable to do any normal activities        8    7. CARDIAC RISK FACTORS: \"Do you have any history of heart problems or risk factors for heart disease?\" (e.g., angina, prior heart attack; diabetes, high blood pressure, high cholesterol, smoker, or strong family history of heart disease)      Patient denies risk " "factors other than elevated cholesterol, not on medication for it, her mother had a heart attack at 40, smokes      8. PULMONARY RISK FACTORS: \"Do you have any history of lung disease?\"  (e.g., blood clots in lung, asthma, emphysema, birth control pills)      Denies     9. CAUSE: \"What do you think is causing the chest pain?\"      Unsure     10. OTHER SYMPTOMS: \"Do you have any other symptoms?\" (e.g., dizziness, nausea, vomiting, sweating, fever, difficulty breathing, cough)        Dizziness, had reflux last night     11. PREGNANCY: \"Is there any chance you are pregnant?\" \"When was your last menstrual period?\"        no    Protocols used: Chest Pain-A-[place]    "

## 2024-04-26 NOTE — DISCHARGE INSTRUCTIONS
Evaluation today was reassuring for unlikely immediately life-threatening serious cause for your chest pain. Please follow up with a PCP (primary care provider) in the next 1-2 weeks. Please review attached instructions including reasons to return to the emergency department, including concerning or significantly worsening change in symptoms.

## 2024-04-26 NOTE — ED TRIAGE NOTES
Pt arrives via private car with complaints of intermittent midsternal chest pain that radiates to back and jaw. Chest pain first began yesterday morning at 0600, pt took a baby aspirin at that time. The pain came and went throughout the day. This morning the pain felt more intense rating it an 8/10, pt took 325 mg of aspirin this morning.      Triage Assessment (Adult)       Row Name 04/26/24 0934          Triage Assessment    Airway WDL WDL        Respiratory WDL    Respiratory WDL X;rhythm/pattern     Rhythm/Pattern, Respiratory shortness of breath        Skin Circulation/Temperature WDL    Skin Circulation/Temperature WDL WDL        Cardiac WDL    Cardiac WDL X;chest pain        Chest Pain Assessment    Chest Pain Location midsternal     Chest Pain Radiation back;jaw     Character pressure     Precipitating Factors nothing     Alleviating Factors nothing     Chest Pain Intervention cardiac monitoring continued;cardiac monitor placed        Peripheral/Neurovascular WDL    Peripheral Neurovascular WDL WDL        Cognitive/Neuro/Behavioral WDL    Cognitive/Neuro/Behavioral WDL WDL

## 2024-04-28 LAB
ATRIAL RATE - MUSE: 71 BPM
DIASTOLIC BLOOD PRESSURE - MUSE: NORMAL MMHG
INTERPRETATION ECG - MUSE: NORMAL
P AXIS - MUSE: 48 DEGREES
PR INTERVAL - MUSE: 160 MS
QRS DURATION - MUSE: 82 MS
QT - MUSE: 384 MS
QTC - MUSE: 417 MS
R AXIS - MUSE: 12 DEGREES
SYSTOLIC BLOOD PRESSURE - MUSE: NORMAL MMHG
T AXIS - MUSE: 21 DEGREES
VENTRICULAR RATE- MUSE: 71 BPM

## 2024-05-16 ENCOUNTER — OFFICE VISIT (OUTPATIENT)
Dept: INTERNAL MEDICINE | Facility: OTHER | Age: 59
End: 2024-05-16
Attending: NURSE PRACTITIONER
Payer: COMMERCIAL

## 2024-05-16 VITALS
BODY MASS INDEX: 27.06 KG/M2 | HEART RATE: 66 BPM | OXYGEN SATURATION: 99 % | WEIGHT: 166.4 LBS | RESPIRATION RATE: 16 BRPM | TEMPERATURE: 97.8 F | SYSTOLIC BLOOD PRESSURE: 134 MMHG | DIASTOLIC BLOOD PRESSURE: 84 MMHG

## 2024-05-16 DIAGNOSIS — Z63.79 STRESSFUL LIFE EVENT AFFECTING FAMILY: ICD-10-CM

## 2024-05-16 DIAGNOSIS — F43.22 ADJUSTMENT DISORDER WITH ANXIOUS MOOD: ICD-10-CM

## 2024-05-16 DIAGNOSIS — K21.00 GASTROESOPHAGEAL REFLUX DISEASE WITH ESOPHAGITIS WITHOUT HEMORRHAGE: ICD-10-CM

## 2024-05-16 DIAGNOSIS — R00.2 PALPITATIONS: Primary | ICD-10-CM

## 2024-05-16 DIAGNOSIS — R94.5 ABNORMAL RESULTS OF LIVER FUNCTION STUDIES: ICD-10-CM

## 2024-05-16 DIAGNOSIS — R68.84 JAW PAIN: ICD-10-CM

## 2024-05-16 DIAGNOSIS — M54.2 NECK PAIN: ICD-10-CM

## 2024-05-16 DIAGNOSIS — E78.2 MIXED HYPERLIPIDEMIA: ICD-10-CM

## 2024-05-16 DIAGNOSIS — E83.52 HYPERCALCEMIA: ICD-10-CM

## 2024-05-16 LAB
ALBUMIN SERPL BCG-MCNC: 4.8 G/DL (ref 3.5–5.2)
ALP SERPL-CCNC: 100 U/L (ref 40–150)
ALT SERPL W P-5'-P-CCNC: 40 U/L (ref 0–50)
ANION GAP SERPL CALCULATED.3IONS-SCNC: 10 MMOL/L (ref 7–15)
AST SERPL W P-5'-P-CCNC: 32 U/L (ref 0–45)
BILIRUB SERPL-MCNC: 0.6 MG/DL
BUN SERPL-MCNC: 22.7 MG/DL (ref 8–23)
CALCIUM SERPL-MCNC: 10.1 MG/DL (ref 8.6–10)
CHLORIDE SERPL-SCNC: 103 MMOL/L (ref 98–107)
CHOLEST SERPL-MCNC: 191 MG/DL
CREAT SERPL-MCNC: 0.78 MG/DL (ref 0.51–0.95)
DEPRECATED HCO3 PLAS-SCNC: 26 MMOL/L (ref 22–29)
EGFRCR SERPLBLD CKD-EPI 2021: 87 ML/MIN/1.73M2
FASTING STATUS PATIENT QL REPORTED: NO
FASTING STATUS PATIENT QL REPORTED: NO
GLUCOSE SERPL-MCNC: 101 MG/DL (ref 70–99)
HDLC SERPL-MCNC: 53 MG/DL
LDLC SERPL CALC-MCNC: 120 MG/DL
NONHDLC SERPL-MCNC: 138 MG/DL
POTASSIUM SERPL-SCNC: 4.2 MMOL/L (ref 3.4–5.3)
PROT SERPL-MCNC: 7.2 G/DL (ref 6.4–8.3)
SODIUM SERPL-SCNC: 139 MMOL/L (ref 135–145)
TRIGL SERPL-MCNC: 91 MG/DL
TSH SERPL DL<=0.005 MIU/L-ACNC: 3.37 UIU/ML (ref 0.3–4.2)

## 2024-05-16 PROCEDURE — 84443 ASSAY THYROID STIM HORMONE: CPT | Mod: ZL | Performed by: NURSE PRACTITIONER

## 2024-05-16 PROCEDURE — 80061 LIPID PANEL: CPT | Mod: ZL | Performed by: NURSE PRACTITIONER

## 2024-05-16 PROCEDURE — 99214 OFFICE O/P EST MOD 30 MIN: CPT | Performed by: NURSE PRACTITIONER

## 2024-05-16 PROCEDURE — 36415 COLL VENOUS BLD VENIPUNCTURE: CPT | Mod: ZL | Performed by: NURSE PRACTITIONER

## 2024-05-16 PROCEDURE — 80053 COMPREHEN METABOLIC PANEL: CPT | Mod: ZL | Performed by: NURSE PRACTITIONER

## 2024-05-16 PROCEDURE — 82306 VITAMIN D 25 HYDROXY: CPT | Mod: ZL | Performed by: NURSE PRACTITIONER

## 2024-05-16 RX ORDER — HYDROXYZINE HYDROCHLORIDE 10 MG/1
TABLET, FILM COATED ORAL
Qty: 30 TABLET | Refills: 4 | Status: SHIPPED | OUTPATIENT
Start: 2024-05-16

## 2024-05-16 RX ORDER — FAMOTIDINE 20 MG/1
20 TABLET, FILM COATED ORAL 2 TIMES DAILY
Qty: 60 TABLET | Refills: 11 | Status: SHIPPED | OUTPATIENT
Start: 2024-05-16

## 2024-05-16 ASSESSMENT — PAIN SCALES - GENERAL: PAINLEVEL: NO PAIN (0)

## 2024-05-16 NOTE — NURSING NOTE
"Chief Complaint   Patient presents with    Follow Up     SOB       FOOD SECURITY SCREENING QUESTIONS  Hunger Vital Signs:  Within the past 12 months we worried whether our food would run out before we got money to buy more. Never  Within the past 12 months the food we bought just didn't last and we didn't have money to get more. Never  Briseida Delacruz LPN 5/16/2024 10:23 AM      Initial /84 (BP Location: Right arm, Patient Position: Sitting, Cuff Size: Adult Regular)   Pulse 66   Temp 97.8  F (36.6  C) (Tympanic)   Resp 16   Wt 75.5 kg (166 lb 6.4 oz)   LMP  (LMP Unknown)   SpO2 99%   BMI 27.06 kg/m   Estimated body mass index is 27.06 kg/m  as calculated from the following:    Height as of 3/21/24: 1.67 m (5' 5.75\").    Weight as of this encounter: 75.5 kg (166 lb 6.4 oz).  Medication Reconciliation: complete    Briseida Delacruz LPN    "

## 2024-05-16 NOTE — PROGRESS NOTES
ICD-10-CM    1. Palpitations  R00.2 TSH with free T4 reflex     TSH with free T4 reflex      2. Abnormal results of liver function studies  R94.5 Comprehensive metabolic panel     Comprehensive metabolic panel      3. Hypercalcemia  E83.52 TSH with free T4 reflex     Comprehensive metabolic panel     Vitamin D Deficiency     TSH with free T4 reflex     Comprehensive metabolic panel     Vitamin D Deficiency      4. Jaw pain  R68.84       5. Neck pain  M54.2       6. Stressful life event affecting family  Z63.79 hydrOXYzine HCl (ATARAX) 10 MG tablet      7. Adjustment disorder with anxious mood  F43.22 TSH with free T4 reflex     hydrOXYzine HCl (ATARAX) 10 MG tablet     TSH with free T4 reflex      8. Mixed hyperlipidemia  E78.2 Lipid Profile     Lipid Profile      9. Gastroesophageal reflux disease with esophagitis without hemorrhage  K21.00 famotidine (PEPCID) 20 MG tablet         Plan:  We discussed additional cardiac workup including Zio patch and stress echocardiogram.  Low risk for coronary disease with the exception of family history and HLD.  Symptoms are atypical as they are at rest and not with exercise.  She was to hold off on any cardiac workup for now.  Check labs today for lipids, TSH, chemistry panel and vitamin D level.  She had mild elevation of bilirubin and AST.  No alcohol or acetaminophen use, no GI symptoms.  Will just recheck labs for now.  Suspect the jaw and neck pain may be related to grinding or grating teeth during the night.  She is going to purchase an over-the-counter mouthguard and try to get an appointment with her dentist.  Also having some palpitations at rest.  She actually describes it as fluttering in the back of the throat and mid chest.  Will start her on famotidine 20 mg twice daily to treat acid reflux as she has been having problems with this but also will add hydroxyzine 10 mg 1-2 tablets up to 3 times daily as needed for anxiety as she has been under a lot more  stress.      Lenny Ellington is a 58 year old, presenting for the following health issues:  Follow Up (SOB)      5/16/2024    10:21 AM   Additional Questions   Roomed by Briseida WILKS   She is here today to follow-up from emergency department visit at the end of April.  She was seen there for some chest discomfort which occurred at the mid sternum bilateral anterior neck and right jaw.  The pain was at rest and worse in the morning.  She has had some increased reflux symptoms.  She is taken over-the-counter antacids and that has helped with the reflux symptoms.  She will also feel a little fluttering in the back of her throat and mid chest that will last for a couple of minutes off and on but then resolved.  She does not become tachycardic.  She does drink carbonated beverages.  Does not drink coffee.  Carbonated beverages have caffeine.  She has been under more stress with her daughter going to college and her considering retiring from her job.  She has felt more anxious.  She does use the elliptical at home but is never had chest discomfort or shortness of breath, dizziness or palpitations.  Usually only experiences these in the morning and at rest.  Has personal history of hyperlipidemia, not treated with statin and family history of heart disease in her mother.  She admits that she usually is quite sedentary.  She did start taking a full aspirin daily and then felt maybe that was more than she should do so she decreased to aspirin 81 mg daily.  She is going to be traveling in the upcoming weeks.              Review of Systems  Constitutional, neuro, ENT, endocrine, pulmonary, cardiac, gastrointestinal, genitourinary, musculoskeletal, integument and psychiatric systems are negative, except as otherwise noted.      Objective    LMP  (LMP Unknown)   There is no height or weight on file to calculate BMI.  Physical Exam   Pleasant female no acute distress.  Affect normal.  Alert and oriented x 4.  Skin color  pink.  Sclera nonicteric.  Neck supple without adenopathy.  No thyromegaly.  Lung fields clear to auscultation throughout.  Cardiovascular regular rate and rhythm with no murmur, S3 or S4.  Abdomen is soft and nontender.  Negative Murillo sign.  No masses or organomegaly.  Extremities without edema.    Emergency department note, laboratory diagnostic studies reviewed and discussed            Signed Electronically by: Raisa Calderon NP

## 2024-05-17 LAB — VIT D+METAB SERPL-MCNC: 110 NG/ML (ref 20–50)

## 2024-06-20 ENCOUNTER — OFFICE VISIT (OUTPATIENT)
Dept: INTERNAL MEDICINE | Facility: OTHER | Age: 59
End: 2024-06-20
Attending: NURSE PRACTITIONER
Payer: COMMERCIAL

## 2024-06-20 ENCOUNTER — ORDERS ONLY (AUTO-RELEASED) (OUTPATIENT)
Dept: INTERNAL MEDICINE | Facility: OTHER | Age: 59
End: 2024-06-20

## 2024-06-20 VITALS
DIASTOLIC BLOOD PRESSURE: 80 MMHG | WEIGHT: 168.4 LBS | HEART RATE: 63 BPM | BODY MASS INDEX: 27.06 KG/M2 | TEMPERATURE: 97.8 F | HEIGHT: 66 IN | RESPIRATION RATE: 16 BRPM | OXYGEN SATURATION: 99 % | SYSTOLIC BLOOD PRESSURE: 128 MMHG

## 2024-06-20 DIAGNOSIS — Z12.31 ENCOUNTER FOR SCREENING MAMMOGRAM FOR BREAST CANCER: ICD-10-CM

## 2024-06-20 DIAGNOSIS — E67.3 HYPERVITAMINOSIS D: ICD-10-CM

## 2024-06-20 DIAGNOSIS — F43.22 ADJUSTMENT DISORDER WITH ANXIOUS MOOD: ICD-10-CM

## 2024-06-20 DIAGNOSIS — M54.2 NECK PAIN: ICD-10-CM

## 2024-06-20 DIAGNOSIS — R00.2 PALPITATIONS: Primary | ICD-10-CM

## 2024-06-20 DIAGNOSIS — Z23 NEED FOR TETANUS, DIPHTHERIA, AND ACELLULAR PERTUSSIS (TDAP) VACCINE: ICD-10-CM

## 2024-06-20 DIAGNOSIS — Z12.11 SPECIAL SCREENING FOR MALIGNANT NEOPLASM OF COLON: ICD-10-CM

## 2024-06-20 DIAGNOSIS — R68.84 JAW PAIN: ICD-10-CM

## 2024-06-20 LAB — VIT D+METAB SERPL-MCNC: 94 NG/ML (ref 20–50)

## 2024-06-20 PROCEDURE — 36415 COLL VENOUS BLD VENIPUNCTURE: CPT | Mod: ZL | Performed by: NURSE PRACTITIONER

## 2024-06-20 PROCEDURE — 82306 VITAMIN D 25 HYDROXY: CPT | Mod: ZL | Performed by: NURSE PRACTITIONER

## 2024-06-20 PROCEDURE — 90715 TDAP VACCINE 7 YRS/> IM: CPT | Performed by: NURSE PRACTITIONER

## 2024-06-20 PROCEDURE — 99214 OFFICE O/P EST MOD 30 MIN: CPT | Mod: 25 | Performed by: NURSE PRACTITIONER

## 2024-06-20 PROCEDURE — 90471 IMMUNIZATION ADMIN: CPT | Performed by: NURSE PRACTITIONER

## 2024-06-20 ASSESSMENT — PAIN SCALES - GENERAL: PAINLEVEL: NO PAIN (0)

## 2024-06-20 NOTE — NURSING NOTE
"Chief Complaint   Patient presents with    RECHECK     SOB       FOOD SECURITY SCREENING QUESTIONS  Hunger Vital Signs:  Within the past 12 months we worried whether our food would run out before we got money to buy more. Never  Within the past 12 months the food we bought just didn't last and we didn't have money to get more. Never  Briseida Delacruz LPN 6/20/2024 8:42 AM      Initial /80 (BP Location: Right arm, Patient Position: Sitting, Cuff Size: Adult Regular)   Pulse 63   Temp 97.8  F (36.6  C) (Tympanic)   Resp 16   Ht 1.67 m (5' 5.75\")   Wt 76.4 kg (168 lb 6.4 oz)   LMP  (LMP Unknown)   SpO2 99%   BMI 27.39 kg/m   Estimated body mass index is 27.39 kg/m  as calculated from the following:    Height as of this encounter: 1.67 m (5' 5.75\").    Weight as of this encounter: 76.4 kg (168 lb 6.4 oz).  Medication Reconciliation: complete    Briseida Delacruz LPN    "

## 2024-06-20 NOTE — PROGRESS NOTES
ICD-10-CM    1. Palpitations  R00.2       2. Jaw pain  R68.84       3. Neck pain  M54.2 CANCELED: DEBRIDEMENT WOUND UP TO 20 SQ CM      4. Adjustment disorder with anxious mood  F43.22       5. Hypervitaminosis D  E67.3 Vitamin D Deficiency     Vitamin D Deficiency      6. Need for tetanus, diphtheria, and acellular pertussis (Tdap) vaccine  Z23 GH IMM - TDAP (ADACEL, BOOSTRIX)      7. Special screening for malignant neoplasm of colon  Z12.11 COLOGUARD([employer])      8. Encounter for screening mammogram for breast cancer  Z12.31 MA Screening Bilateral w/ Kartik         Plan:  1.  Heart palpitation resolved with taking hydroxyzine.  She does not want any further cardiac workup since she is finding relief with treating anxiety.    2.  Neck and jaw pain resolved with treating TMJ with nightguard.  3.  Recheck vitamin D level.  4.  Tdap provided.  Reviewed and discussed other immunizations that are due but she declined those at this time.  Will consider at later date.  5.  Due for breast and colon cancer screening.  Order placed for mammogram and Cologuard.    Lenny Ellington is a 59 year old, presenting for the following health issues:  RECHECK (SOB)      6/20/2024     8:40 AM   Additional Questions   Roomed by Briseida LORA     She is here today for follow-up on heart palpitations, shortness of breath, anxiety and high vitamin D level.  When she has felt heart palpitations and shortness of breath she has taken the hydroxyzine and symptoms resolved.  She is having no side effects from hydroxyzine.  She does not feel she needs any further workup of this problem.  Anxiety also seems to be better.  She did have some time off work for vacation and that also helped with her overall stress.  She is due to have vitamin D level rechecked.  She is also due for colon and breast cancer screening and asymptomatic.  Due for several vaccines.    History of Present Illness       Reason for visit:  Follow up    She eats 2-3  "servings of fruits and vegetables daily.She consumes 0 sweetened beverage(s) daily.She exercises with enough effort to increase her heart rate 9 or less minutes per day.  She exercises with enough effort to increase her heart rate 3 or less days per week.   She is taking medications regularly.                     Objective    /80 (BP Location: Right arm, Patient Position: Sitting, Cuff Size: Adult Regular)   Pulse 63   Temp 97.8  F (36.6  C) (Tympanic)   Resp 16   Ht 1.67 m (5' 5.75\")   Wt 76.4 kg (168 lb 6.4 oz)   LMP  (LMP Unknown)   SpO2 99%   BMI 27.39 kg/m    Body mass index is 27.39 kg/m .  Physical Exam   Pleasant female no acute distress.  Affect normal.  Alert and oriented x 4.  Lung fields clear to auscultation.  Cardiovascular regular.            Signed Electronically by: Raisa Calderon NP    "

## 2024-07-15 LAB — NONINV COLON CA DNA+OCC BLD SCRN STL QL: NEGATIVE

## 2024-07-23 ENCOUNTER — HOSPITAL ENCOUNTER (OUTPATIENT)
Dept: MAMMOGRAPHY | Facility: OTHER | Age: 59
Discharge: HOME | End: 2024-07-23
Attending: NURSE PRACTITIONER | Admitting: NURSE PRACTITIONER
Payer: COMMERCIAL

## 2024-07-23 DIAGNOSIS — Z12.31 ENCOUNTER FOR SCREENING MAMMOGRAM FOR BREAST CANCER: ICD-10-CM

## 2024-07-23 PROCEDURE — 77063 BREAST TOMOSYNTHESIS BI: CPT

## 2024-10-21 ENCOUNTER — MYC MEDICAL ADVICE (OUTPATIENT)
Dept: FAMILY MEDICINE | Facility: OTHER | Age: 59
End: 2024-10-21
Payer: COMMERCIAL

## 2024-10-21 DIAGNOSIS — R42 DIZZINESS: Primary | ICD-10-CM

## 2024-10-23 NOTE — TELEPHONE ENCOUNTER
Pt wondering if she can have labs prior to her upcoming appt on 10/31.     Raghavendra'd up Vit D/CMP/CBC/TSH/Lipid labs.     Routing to provider to review and respond.  Yonis Bolanos RN on 10/23/2024 at 1:49 PM

## 2024-10-29 ENCOUNTER — LAB (OUTPATIENT)
Dept: LAB | Facility: OTHER | Age: 59
End: 2024-10-29
Attending: PHYSICIAN ASSISTANT
Payer: COMMERCIAL

## 2024-10-29 DIAGNOSIS — R42 DIZZINESS: ICD-10-CM

## 2024-10-29 LAB
ALBUMIN SERPL BCG-MCNC: 4.9 G/DL (ref 3.5–5.2)
ALP SERPL-CCNC: 109 U/L (ref 40–150)
ALT SERPL W P-5'-P-CCNC: 50 U/L (ref 0–50)
ANION GAP SERPL CALCULATED.3IONS-SCNC: 8 MMOL/L (ref 7–15)
AST SERPL W P-5'-P-CCNC: 31 U/L (ref 0–45)
BASOPHILS # BLD AUTO: 0 10E3/UL (ref 0–0.2)
BASOPHILS NFR BLD AUTO: 1 %
BILIRUB SERPL-MCNC: 0.7 MG/DL
BUN SERPL-MCNC: 17.5 MG/DL (ref 8–23)
CALCIUM SERPL-MCNC: 10 MG/DL (ref 8.8–10.4)
CHLORIDE SERPL-SCNC: 103 MMOL/L (ref 98–107)
CREAT SERPL-MCNC: 0.83 MG/DL (ref 0.51–0.95)
EGFRCR SERPLBLD CKD-EPI 2021: 80 ML/MIN/1.73M2
EOSINOPHIL # BLD AUTO: 0.1 10E3/UL (ref 0–0.7)
EOSINOPHIL NFR BLD AUTO: 1 %
ERYTHROCYTE [DISTWIDTH] IN BLOOD BY AUTOMATED COUNT: 12.1 % (ref 10–15)
FERRITIN SERPL-MCNC: 147 NG/ML (ref 11–328)
GLUCOSE SERPL-MCNC: 103 MG/DL (ref 70–99)
HCO3 SERPL-SCNC: 28 MMOL/L (ref 22–29)
HCT VFR BLD AUTO: 46.4 % (ref 35–47)
HGB BLD-MCNC: 15.2 G/DL (ref 11.7–15.7)
IMM GRANULOCYTES # BLD: 0 10E3/UL
IMM GRANULOCYTES NFR BLD: 0 %
LYMPHOCYTES # BLD AUTO: 2.7 10E3/UL (ref 0.8–5.3)
LYMPHOCYTES NFR BLD AUTO: 35 %
MAGNESIUM SERPL-MCNC: 2.1 MG/DL (ref 1.7–2.3)
MCH RBC QN AUTO: 29.2 PG (ref 26.5–33)
MCHC RBC AUTO-ENTMCNC: 32.8 G/DL (ref 31.5–36.5)
MCV RBC AUTO: 89 FL (ref 78–100)
MONOCYTES # BLD AUTO: 0.5 10E3/UL (ref 0–1.3)
MONOCYTES NFR BLD AUTO: 7 %
NEUTROPHILS # BLD AUTO: 4.3 10E3/UL (ref 1.6–8.3)
NEUTROPHILS NFR BLD AUTO: 57 %
NRBC # BLD AUTO: 0 10E3/UL
NRBC BLD AUTO-RTO: 0 /100
PLATELET # BLD AUTO: 255 10E3/UL (ref 150–450)
POTASSIUM SERPL-SCNC: 4.7 MMOL/L (ref 3.4–5.3)
PROT SERPL-MCNC: 7.5 G/DL (ref 6.4–8.3)
RBC # BLD AUTO: 5.21 10E6/UL (ref 3.8–5.2)
SODIUM SERPL-SCNC: 139 MMOL/L (ref 135–145)
TSH SERPL DL<=0.005 MIU/L-ACNC: 2.56 UIU/ML (ref 0.3–4.2)
VIT B12 SERPL-MCNC: 719 PG/ML (ref 232–1245)
WBC # BLD AUTO: 7.6 10E3/UL (ref 4–11)

## 2024-10-29 PROCEDURE — 36415 COLL VENOUS BLD VENIPUNCTURE: CPT | Mod: ZL

## 2024-10-29 PROCEDURE — 82728 ASSAY OF FERRITIN: CPT | Mod: ZL

## 2024-10-29 PROCEDURE — 82306 VITAMIN D 25 HYDROXY: CPT | Mod: ZL

## 2024-10-29 PROCEDURE — 82607 VITAMIN B-12: CPT | Mod: ZL

## 2024-10-29 PROCEDURE — 85004 AUTOMATED DIFF WBC COUNT: CPT | Mod: ZL

## 2024-10-29 PROCEDURE — 84443 ASSAY THYROID STIM HORMONE: CPT | Mod: ZL

## 2024-10-29 PROCEDURE — 83735 ASSAY OF MAGNESIUM: CPT | Mod: ZL

## 2024-10-29 PROCEDURE — 82947 ASSAY GLUCOSE BLOOD QUANT: CPT | Mod: ZL

## 2024-10-29 PROCEDURE — 84155 ASSAY OF PROTEIN SERUM: CPT | Mod: ZL

## 2024-10-29 NOTE — PROGRESS NOTES
Assessment & Plan     1. Muscle tension pain (Primary)  2. Chronic left shoulder pain  3. Neck pain  Chronic, persistent. Exam showing significant tension to muscles in shoulders and neck like contributing to her persistent neck, shoulder, chest discomfort as well as tension headaches.  Prescription for muscle relaxer to take as needed on worst days.  Referral to physical therapy for conservative management.  Also encouraged to purchase supportive bra as she is larger chested which she is likely causing some additional strain.  - tiZANidine (ZANAFLEX) 2 MG tablet; Take 1-2 tablets (2-4 mg) by mouth 3 times daily as needed for muscle spasms.  Dispense: 60 tablet; Refill: 1  - Physical Therapy  Referral; Future    4. Tension headache  Describe symptoms and exam consistent with tension headache likely attributed to above.  Continue symptomatic management.  Prescription for tizanidine for as needed use.  Physical therapy referral placed.  - Physical Therapy  Referral; Future    5. Breast pain, left  Chronic with normal screening mammograms throughout the years.  Likely musculoskeletal as related as above.  If symptoms persist consider following up with breast specialty.    6. Left knee pain, unspecified chronicity  Describes symptoms and exam most consistent with strain and/or possible healing meniscal injury.  Due to significant improvement in symptoms recommend continued at home conservative management.  Given packet of AAOS stretches and exercises to try.  If minimal improvement consider formal physical therapy.    7. Dizziness  Differential includes anemia, vitamin, iron, electrolyte abnormality, thyroid disorder, hypo or hypertension, hypo or hyperglycemia, dehydration, underlying cardiac abnormality, underlying neurologic abnormality, vertigo, inner ear cause, etc.  Describe symptoms and exam most consistent with probable vertigo/inner ear caused as symptoms are quite positional.  Reassuring  vitals and remainder of exam.  Reassuring lab work completed earlier this week.  Discussed trying Epley maneuvers as well good hydration.  If minimal improvement consider additional evaluation.  - Ferritin; Future      No follow-ups on file.    Lenny Ellington is a 59 year old, presenting for the following health issues:  Dizziness and Pain (Left breast and knee)    History of Present Illness       Reason for visit:  Follow up on lab work    She eats 2-3 servings of fruits and vegetables daily.She consumes 0 sweetened beverage(s) daily.She exercises with enough effort to increase her heart rate 9 or less minutes per day.  She exercises with enough effort to increase her heart rate 3 or less days per week.   She is taking medications regularly.     Breast, shoulder, neck pain:  Has been dealing with some tension and discomfort throughout her lateral left breast region extending into her shoulder and left neck region for several years.  Has had previously benign mammogram screenings.  She does have larger breasts that she struggles with supportive bras.  Does sit at a desk regularly for work.  Has been noticing increasing frequency of headaches that mostly occur on her left temporal and parietal region.  Symptoms typically last 30 to 60 minutes.  Have been occurring near daily more recently.  Did have lab work completed earlier this week which was largely unrevealing.  No associated visual changes, aura, nausea, vomiting, numbness or tingling.    Knee:  Onset of left knee pain in July without known injury.  Had been wearing a compression brace for the last 8 weeks and has noticed significant improvement in pain.  Pain is currently 2 out of 10 on pain severity scale.  Able to bear weight and ambulate.  No numbness or tingling, weakness, overlying erythema, warmth.    Dizziness:  Seems mostly positional related occurring when she changes positions or when she is rolling over in bed.  Sometimes occurring without  positional changes.  Symptoms only last a few seconds.  Has tried to stay well-hydrated.  No associated chest pain or pressure, palpitations, lightheadedness, syncope, vision changes, shortness of breath.  Lab work completed earlier this week including CBC, CMP, vitamin levels, magnesium, thyroid which was largely unrevealing.  Does have tinnitus in right ear and has been wearing a hearing aid.        PAST MEDICAL HISTORY:   Past Medical History:   Diagnosis Date    Diffuse cystic mastopathy of breast     No Comments Provided    Encounter for screening for malignant neoplasm of colon     11/11/2014    Female infertility     Secondary to Chlamydia and pelvic infections    Other chest pain     No Comments Provided    Other specified postprocedural states     12/8/2016    Personal history of other mental and behavioral disorders     with fatigue    Superficial foreign body of right foot     11/29/2016       PAST SURGICAL HISTORY:   Past Surgical History:   Procedure Laterality Date    COLONOSCOPY  11/13/2014    Normal 10 year follow up    Pyloric stenosis      Operated in infancy    Removal of foreign body from foot Right 12/08/2016    Salpingostomies  1997     [place], [place]    TONSILLECTOMY      TYMPANOSTOMY, LOCAL/TOPICAL ANESTHESIA      Bilateral TM perforations and tympanostomies in childhood       FAMILY HISTORY:   Family History   Problem Relation Age of Onset    Heart Disease Mother              Cancer Mother         Cancer, lung cancer    Chronic Obstructive Pulmonary Disease Mother     Hypothyroidism Father     Prostate Cancer Paternal Grandfather         Cancer-prostate    Neuropathy Brother     Hypothyroidism Brother     Breast Cancer No family hx of         Cancer-breast       SOCIAL HISTORY:   Social History     Tobacco Use    Smoking status: Never    Smokeless tobacco: Never   Substance Use Topics    Alcohol use: Not Currently     Alcohol/week: 0.0 standard drinks of alcohol     Comment: rare      No Known  "Allergies  Current Outpatient Medications   Medication Sig Dispense Refill    tiZANidine (ZANAFLEX) 2 MG tablet Take 1-2 tablets (2-4 mg) by mouth 3 times daily as needed for muscle spasms. 60 tablet 1     No current facility-administered medications for this visit.           Objective    /70   Pulse 68   Temp 98.2  F (36.8  C) (Tympanic)   Resp 18   Ht 1.702 m (5' 7\")   Wt 76.4 kg (168 lb 6.4 oz)   LMP  (LMP Unknown)   SpO2 98%   BMI 26.38 kg/m    Body mass index is 26.38 kg/m .  Physical Exam   General: Pleasant, in no apparent distress.  Eyes: Sclera are white and conjunctiva are clear bilaterally. Lacrimal apparatus free of erythema, edema, and discharge bilaterally.  Ears: External ears without erythema or edema. Tympanic membranes are pearly white and without erythema, scarring or perforations bilaterally. External auditory canals are free of foreign bodies, erythema, ulcers, and masses.  Nose: External nose is symmetrical and free of lesions and deformities.  Cardiovascular: Regular rate and rhythm with S1 equal to S2. No murmurs, friction rubs, or gallops.   Respiratory: Lungs are resonant and clear to auscultation bilaterally. No wheezes, crackles, or rhonchi.  Musculoskeletal: Mild tenderness to medial aspect of left knee.  No limitations in flexion or extension.  Negative anterior posterior drawer, varus and valgus stress test in the Steinmann test on left.  Nonantalgic gait in clinic.  Significant tension to bilateral posterior cervical region extending into bilateral shoulder regions.  Neurologic Exam: Normal gross motor, tone coordination and no visible tremor.  Skin: No jaundice, pallor, rashes, or lesions.  Psych: Appropriate mood and affect.        Signed Electronically by: Es Burgess PA-C    "

## 2024-10-30 LAB — VIT D+METAB SERPL-MCNC: 70 NG/ML (ref 20–50)

## 2024-10-31 ENCOUNTER — OFFICE VISIT (OUTPATIENT)
Dept: FAMILY MEDICINE | Facility: OTHER | Age: 59
End: 2024-10-31
Attending: PHYSICIAN ASSISTANT
Payer: COMMERCIAL

## 2024-10-31 VITALS
SYSTOLIC BLOOD PRESSURE: 126 MMHG | TEMPERATURE: 98.2 F | OXYGEN SATURATION: 98 % | WEIGHT: 168.4 LBS | DIASTOLIC BLOOD PRESSURE: 70 MMHG | BODY MASS INDEX: 26.43 KG/M2 | RESPIRATION RATE: 18 BRPM | HEART RATE: 68 BPM | HEIGHT: 67 IN

## 2024-10-31 DIAGNOSIS — G89.29 CHRONIC LEFT SHOULDER PAIN: ICD-10-CM

## 2024-10-31 DIAGNOSIS — N64.4 BREAST PAIN, LEFT: ICD-10-CM

## 2024-10-31 DIAGNOSIS — M25.562 LEFT KNEE PAIN, UNSPECIFIED CHRONICITY: ICD-10-CM

## 2024-10-31 DIAGNOSIS — M79.10 MUSCLE TENSION PAIN: Primary | ICD-10-CM

## 2024-10-31 DIAGNOSIS — M25.512 CHRONIC LEFT SHOULDER PAIN: ICD-10-CM

## 2024-10-31 DIAGNOSIS — G44.209 TENSION HEADACHE: ICD-10-CM

## 2024-10-31 DIAGNOSIS — M54.2 NECK PAIN: ICD-10-CM

## 2024-10-31 DIAGNOSIS — R42 DIZZINESS: ICD-10-CM

## 2024-10-31 LAB — FERRITIN SERPL-MCNC: 131 NG/ML (ref 11–328)

## 2024-10-31 PROCEDURE — G2211 COMPLEX E/M VISIT ADD ON: HCPCS | Performed by: PHYSICIAN ASSISTANT

## 2024-10-31 PROCEDURE — 99214 OFFICE O/P EST MOD 30 MIN: CPT | Performed by: PHYSICIAN ASSISTANT

## 2024-10-31 RX ORDER — TIZANIDINE 2 MG/1
2-4 TABLET ORAL 3 TIMES DAILY PRN
Qty: 60 TABLET | Refills: 1 | Status: SHIPPED | OUTPATIENT
Start: 2024-10-31

## 2024-10-31 ASSESSMENT — PAIN SCALES - GENERAL: PAINLEVEL_OUTOF10: SEVERE PAIN (7)

## 2024-10-31 NOTE — NURSING NOTE
"Chief Complaint   Patient presents with    Dizziness    Pain     Left breast and knee       Initial /70   Pulse 68   Temp 98.2  F (36.8  C) (Tympanic)   Resp 18   Ht 1.702 m (5' 7\")   Wt 76.4 kg (168 lb 6.4 oz)   LMP  (LMP Unknown)   SpO2 98%   BMI 26.38 kg/m   Estimated body mass index is 26.38 kg/m  as calculated from the following:    Height as of this encounter: 1.702 m (5' 7\").    Weight as of this encounter: 76.4 kg (168 lb 6.4 oz).  Medication Review: complete    The next two questions are to help us understand your food security.  If you are feeling you need any assistance in this area, we have resources available to support you today.           No data to display                  Health Care Directive:  Patient does not have a Health Care Directive: Discussed advance care planning with patient; however, patient declined at this time.    Jina Nelson LPN      "

## 2025-02-24 ENCOUNTER — OFFICE VISIT (OUTPATIENT)
Dept: OTOLARYNGOLOGY | Facility: OTHER | Age: 60
End: 2025-02-24
Attending: OTOLARYNGOLOGY
Payer: COMMERCIAL

## 2025-02-24 VITALS
HEART RATE: 67 BPM | HEIGHT: 67 IN | WEIGHT: 157 LBS | RESPIRATION RATE: 16 BRPM | DIASTOLIC BLOOD PRESSURE: 74 MMHG | BODY MASS INDEX: 24.64 KG/M2 | TEMPERATURE: 98.4 F | OXYGEN SATURATION: 97 % | SYSTOLIC BLOOD PRESSURE: 120 MMHG

## 2025-02-24 DIAGNOSIS — H72.91 PERFORATION OF TYMPANIC MEMBRANE, RIGHT: Primary | ICD-10-CM

## 2025-02-24 DIAGNOSIS — H90.A31 MIXED CONDUCTIVE AND SENSORINEURAL HEARING LOSS OF RIGHT EAR WITH RESTRICTED HEARING OF LEFT EAR: ICD-10-CM

## 2025-02-24 DIAGNOSIS — Z90.09 HISTORY OF STAPEDECTOMY: ICD-10-CM

## 2025-02-24 PROCEDURE — 92504 EAR MICROSCOPY EXAMINATION: CPT | Performed by: OTOLARYNGOLOGY

## 2025-02-24 PROCEDURE — 99213 OFFICE O/P EST LOW 20 MIN: CPT | Mod: 25 | Performed by: OTOLARYNGOLOGY

## 2025-02-24 RX ORDER — OFLOXACIN 3 MG/ML
5 SOLUTION AURICULAR (OTIC) 2 TIMES DAILY
Qty: 5 ML | Refills: 1 | Status: SHIPPED | OUTPATIENT
Start: 2025-02-24 | End: 2025-03-03

## 2025-02-24 ASSESSMENT — PAIN SCALES - GENERAL: PAINLEVEL_OUTOF10: NO PAIN (0)

## 2025-02-24 NOTE — PATIENT INSTRUCTIONS
Follow-up for in-office myringoplasty  Do not wear any earrings (on either side) on the day of the procedure  Floxin otic drops 7 days prior to surgery, 5 drops in right ear twice a day  Pre op audiogram  4-6 week post op with Dr. Patel    Thank you for allowing Dr. Patel and our ENT team to participate in your care.  If your medications are too expensive, please give the nurse a call.  We can possibly change this medication.  If you have a scheduling or an appointment question please contact our Health Unit Coordinator at their direct line [phone].   ALL nursing questions or concerns can be directed to your ENT nurse, James, at: [phone]
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
## 2025-02-25 ENCOUNTER — TELEPHONE (OUTPATIENT)
Dept: AUDIOLOGY | Facility: OTHER | Age: 60
End: 2025-02-25

## 2025-02-25 NOTE — TELEPHONE ENCOUNTER
Called patient and left a message to call back to schedule audiology appointment prior to ENT.    Cinda Bryson   Audiology Assistant  [facility]   [phone]

## 2025-03-05 ENCOUNTER — TELEPHONE (OUTPATIENT)
Dept: OTOLARYNGOLOGY | Facility: OTHER | Age: 60
End: 2025-03-05

## 2025-04-08 ENCOUNTER — OFFICE VISIT (OUTPATIENT)
Dept: FAMILY MEDICINE | Facility: OTHER | Age: 60
End: 2025-04-08
Attending: PHYSICIAN ASSISTANT
Payer: COMMERCIAL

## 2025-04-08 VITALS
DIASTOLIC BLOOD PRESSURE: 74 MMHG | RESPIRATION RATE: 18 BRPM | SYSTOLIC BLOOD PRESSURE: 124 MMHG | OXYGEN SATURATION: 97 % | HEART RATE: 65 BPM | BODY MASS INDEX: 26.03 KG/M2 | WEIGHT: 166.2 LBS | TEMPERATURE: 98 F

## 2025-04-08 DIAGNOSIS — H69.92 ACUTE DYSFUNCTION OF LEFT EUSTACHIAN TUBE: Primary | ICD-10-CM

## 2025-04-08 ASSESSMENT — PAIN SCALES - GENERAL: PAINLEVEL_OUTOF10: SEVERE PAIN (8)

## 2025-04-08 NOTE — PROGRESS NOTES
Assessment & Plan     Acute dysfunction of left eustachian tube  Describe symptoms and exam consistent with probable eustachian tube dysfunction likely secondary to recent URI symptoms.  No signs of infection on exam.  Recommend over-the-counter decongestant and/or antihistamine for symptomatic management.  If symptoms persist or worsen follow-up for further evaluation.          No follow-ups on file.    Subjective   Rakel is a 59 year old, presenting for the following health issues:  Jaw Pain and Otalgia    History of Present Illness       Reason for visit:  Left ear pain, left jaw pain, ear popping  Symptom onset:  1-2 weeks ago  Symptoms include:  Ear pain, jaw pain  Symptom intensity:  Moderate  Symptom progression:  Worsening  Had these symptoms before:  No  What makes it better:  Advil, Tylenol - a little better.  Allergy meds -loratadine - not much   She is taking medications regularly.        1-1/2 weeks of left ear discomfort, popping, radiating into jaw area.  Managing with over-the-counter Advil and Tylenol which helps slightly.  Tried loratadine once without improvement.  Tried Flonase once but dealt with side effects of discontinued.  Has appoint with dentist for this afternoon for further evaluation as well.  Of note patient did have a slight cold a couple weeks ago that did not progress significantly.  She took Zicam and symptoms seem to stabilize.  Does struggle some with seasonal allergies.  No ear drainage.  She wanted to get things checked as she is scheduled for hearing test on Friday in preparation for surgical management of her right tympanic membrane perforation.    PAST MEDICAL HISTORY:   Past Medical History:   Diagnosis Date    Diffuse cystic mastopathy of breast     No Comments Provided    Encounter for screening for malignant neoplasm of colon     11/11/2014    Female infertility     Secondary to Chlamydia and pelvic infections    Other chest pain     No Comments Provided    Other  specified postprocedural states     12/8/2016    Personal history of other mental and behavioral disorders     with fatigue    Superficial foreign body of right foot     11/29/2016       PAST SURGICAL HISTORY:   Past Surgical History:   Procedure Laterality Date    COLONOSCOPY  11/13/2014    Normal 10 year follow up    Pyloric stenosis      Operated in infancy    Removal of foreign body from foot Right 12/08/2016    Salpingostomies  1997     [place], [place]    TONSILLECTOMY      TYMPANOSTOMY, LOCAL/TOPICAL ANESTHESIA      Bilateral TM perforations and tympanostomies in childhood       FAMILY HISTORY:   Family History   Problem Relation Age of Onset    Heart Disease Mother              Cancer Mother         Cancer, lung cancer    Chronic Obstructive Pulmonary Disease Mother     Hypothyroidism Father     Prostate Cancer Paternal Grandfather         Cancer-prostate    Neuropathy Brother     Hypothyroidism Brother     Breast Cancer No family hx of         Cancer-breast       SOCIAL HISTORY:   Social History     Tobacco Use    Smoking status: Never    Smokeless tobacco: Never   Substance Use Topics    Alcohol use: Not Currently     Alcohol/week: 0.0 standard drinks of alcohol     Comment: rare      No Known Allergies  Current Outpatient Medications   Medication Sig Dispense Refill    tiZANidine (ZANAFLEX) 2 MG tablet Take 1-2 tablets (2-4 mg) by mouth 3 times daily as needed for muscle spasms. 60 tablet 1     No current facility-administered medications for this visit.           Objective    /74   Pulse 65   Temp 98  F (36.7  C) (Tympanic)   Resp 18   Wt 75.4 kg (166 lb 3.2 oz)   LMP  (LMP Unknown)   SpO2 97%   BMI 26.03 kg/m    Body mass index is 26.03 kg/m .  Physical Exam   General: Pleasant, in no apparent distress.  Eyes: Sclera are white and conjunctiva are clear bilaterally. Lacrimal apparatus free of erythema, edema, and discharge bilaterally.  Ears: External ears without erythema or edema.  Left tympanic  membrane with slight bulging without visible effusion or erythema.  Small perforation to right tympanic membrane without associated erythema, drainage.  Nose: External nose is symmetrical and free of lesions and deformities.  Oropharynx: Oral mucosa is pink and without ulcers, nodules, and white patches. Tongue is symmetrical, pink, and without masses or lesions. Pharynx is pink, symmetrical, and without lesions. Uvula is midline. Tonsils are pink, symmetrical, and without edema, ulcers, or exudates, and 1+ bilaterally. No tenderness to palpation over bilateral TMJ or jaw. No popping or clicking with ROM of TMJ bilaterally.   Neck: No cervical lymphadenopathy on inspection and palpation.  TRs, normal gross motor, tone coordination and no visible tremor.  Skin: No jaundice, pallor, rashes, or lesions.  Psych: Appropriate mood and affect.      Signed Electronically by: Es Burgess PA-C

## 2025-04-10 ENCOUNTER — TELEPHONE (OUTPATIENT)
Dept: OTOLARYNGOLOGY | Facility: OTHER | Age: 60
End: 2025-04-10

## 2025-04-10 NOTE — TELEPHONE ENCOUNTER
Patient reports she is having jaw/ear pain. She is concerned that her ears may be plugged and is wondering if she should delay her preop audiogram that is scheduled for tomorrow, 4/11. Patient was seen by EDISON Fisher on 4/8. Es looked in the patient's ears and did not note any impacted cerumen or drainage. Patient denies hearing concerns. Okay for patient to proceed with audiogram as scheduled.

## 2025-04-14 ENCOUNTER — TELEPHONE (OUTPATIENT)
Dept: OTOLARYNGOLOGY | Facility: OTHER | Age: 60
End: 2025-04-14

## 2025-04-14 NOTE — TELEPHONE ENCOUNTER
"Patient was seen Friday, 4/11, for audiogram. She reports her hearing has decreased. Starting Saturday, patient having tinnitus in left ear. Per patient, that has never happened before. She also is concerned because her hearing is now worse on the left ear -it was worse on right ear previously. She was taking Claritin D, but she stopped it because she thought it may be contributing to the issue (she felt like she was getting \"too dry\").     Patient was also wondering if dental issues may be contributing. She was started on Augmentin and has an appointment with the dentist this afternoon.   Will discuss with one of our providers and call patient back.   "

## 2025-04-14 NOTE — TELEPHONE ENCOUNTER
Patient reports she had not noticed the hearing loss. She came for her preop audio on Friday and was informed of the change. Per Debby York, patient should follow-up in surgery as scheduled and discuss with Dr. Patel. Patient verbalizes understanding.

## 2025-04-22 ENCOUNTER — TELEPHONE (OUTPATIENT)
Dept: OTOLARYNGOLOGY | Facility: OTHER | Age: 60
End: 2025-04-22

## 2025-04-22 NOTE — TELEPHONE ENCOUNTER
Patient reports about 3 weeks ago she started having really bad ear pain in left ear and jaw pain. Denies drainage or other symptoms. Patient uses a  due to grinding her teeth, but she has not been diagnosed with TMJ. Explained that with jaw pain, her ear pain may be referred pain, but we can't know that without a full assessment.   She is also concerned about decreasing hearing in the left ear. She would like to hold off on the right fat myringoplasty until further assessment of the left ear can be completed. She would also like medical clearance for a left hearing aid, if appropriate. Patient was scheduled 5/5 for the fat myringoplasty. Adjusted the appointment to a regular visit to have left ear concerns addressed. Will reschedule procedure in the future.

## 2025-05-05 ENCOUNTER — OFFICE VISIT (OUTPATIENT)
Dept: OTOLARYNGOLOGY | Facility: OTHER | Age: 60
End: 2025-05-05
Attending: OTOLARYNGOLOGY
Payer: COMMERCIAL

## 2025-05-05 VITALS
OXYGEN SATURATION: 98 % | SYSTOLIC BLOOD PRESSURE: 128 MMHG | HEART RATE: 63 BPM | DIASTOLIC BLOOD PRESSURE: 66 MMHG | BODY MASS INDEX: 26.09 KG/M2 | WEIGHT: 166.23 LBS | TEMPERATURE: 97.3 F | HEIGHT: 67 IN | RESPIRATION RATE: 16 BRPM

## 2025-05-05 DIAGNOSIS — H90.A31 MIXED CONDUCTIVE AND SENSORINEURAL HEARING LOSS OF RIGHT EAR WITH RESTRICTED HEARING OF LEFT EAR: Primary | ICD-10-CM

## 2025-05-05 DIAGNOSIS — H92.02 OTALGIA, LEFT: ICD-10-CM

## 2025-05-05 DIAGNOSIS — H90.A22 SENSORINEURAL HEARING LOSS (SNHL) OF LEFT EAR WITH RESTRICTED HEARING OF RIGHT EAR: ICD-10-CM

## 2025-05-05 DIAGNOSIS — M26.622 TMJ TENDERNESS, LEFT: ICD-10-CM

## 2025-05-05 DIAGNOSIS — H93.A2 PULSATILE TINNITUS OF LEFT EAR: ICD-10-CM

## 2025-05-05 PROCEDURE — 3078F DIAST BP <80 MM HG: CPT | Performed by: NURSE PRACTITIONER

## 2025-05-05 PROCEDURE — 3074F SYST BP LT 130 MM HG: CPT | Performed by: NURSE PRACTITIONER

## 2025-05-05 PROCEDURE — 99213 OFFICE O/P EST LOW 20 MIN: CPT | Mod: 25 | Performed by: NURSE PRACTITIONER

## 2025-05-05 PROCEDURE — 1126F AMNT PAIN NOTED NONE PRSNT: CPT | Performed by: NURSE PRACTITIONER

## 2025-05-05 PROCEDURE — 92511 NASOPHARYNGOSCOPY: CPT | Performed by: NURSE PRACTITIONER

## 2025-05-05 PROCEDURE — 92504 EAR MICROSCOPY EXAMINATION: CPT | Performed by: NURSE PRACTITIONER

## 2025-05-05 ASSESSMENT — PAIN SCALES - GENERAL: PAINLEVEL_OUTOF10: NO PAIN (0)

## 2025-05-05 NOTE — LETTER
5/5/2025      Rakel Mckay  [address] [place] [address]      Dear Colleague,    Thank you for referring your patient, Rakel Mckay, to the [facility]. Please see a copy of my visit note below.    Otolaryngology Note         Chief Complaint:     Patient presents with:  Ear Problem: Left ear pain           History of Present Illness:     Rakel Mckay is a 59 year old female seen today for hearing loss and tinnitus    She was last seen in ENT on 2/24/2025 by Dr. Patel for concerns for right-sided hearing loss.   On exam there was a right sided dry posterior superior 5% perforation with surgical changes consistent with prior stapedectomy and mild myringosclerosis.  The left tympanic membrane was intact without effusion or retraction.  Dr. Patel had discussed right sided fat myringoplasty    History of right ear infection     Recent onset Left sided ear pain and pain into the jaw.  She was seen in clinic and noted slight bulge left without obvious infection or effusion.    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  Comes and goes.  Worse at night  Left and right with high pitched tinnitus that comes and goes.      Left ear pain is improving  + allergy symptoms prior to onset of pain and pulsing  No history of smoking  No rotary vertigo  Family history of adult onset hearing loss  No facial numbness or tingling.     Audiogram dated 4/11/2025 shows normal thresholds sloping to a high-frequency left mild to moderate to severe mixed hearing loss large volume B tympanogram on the right AD left.  SRT bilaterally 20 dB  100% word recognition score    MRI IAC completed 1/10/24 negative          Medications:     No current outpatient medications on file.            Allergies:     Allergies: Patient has no known allergies.          Past  "Medical History:     Past Medical History:   Diagnosis Date     Diffuse cystic mastopathy of breast     No Comments Provided     Encounter for screening for malignant neoplasm of colon     11/11/2014     Female infertility     Secondary to Chlamydia and pelvic infections     Other chest pain     No Comments Provided     Other specified postprocedural states     12/8/2016     Personal history of other mental and behavioral disorders     with fatigue     Superficial foreign body of right foot     11/29/2016            Past Surgical History:     Past Surgical History:   Procedure Laterality Date     COLONOSCOPY  11/13/2014    Normal 10 year follow up     Pyloric stenosis      Operated in infancy     Removal of foreign body from foot Right 12/08/2016     Salpingostomies  1997     [place], [place]     TONSILLECTOMY       TYMPANOSTOMY, LOCAL/TOPICAL ANESTHESIA      Bilateral TM perforations and tympanostomies in childhood       ENT family history reviewed         Social History:     Social History     Tobacco Use     Smoking status: Never     Smokeless tobacco: Never   Vaping Use     Vaping status: Never Used   Substance Use Topics     Alcohol use: Not Currently     Alcohol/week: 0.0 standard drinks of alcohol     Comment: rare     Drug use: Never            Review of Systems:     ROS: See HPI         Physical Exam:     /66 (BP Location: Left arm, Patient Position: Sitting, Cuff Size: Adult Regular)   Pulse 63   Temp 97.3  F (36.3  C) (Tympanic)   Resp 16   Ht 1.702 m (5' 7.01\")   Wt 75.4 kg (166 lb 3.6 oz)   LMP  (LMP Unknown)   SpO2 98%   BMI 26.03 kg/m    General - The patient is well nourished and well developed, and appears to have good nutritional status.  Alert and oriented to person and place, answers questions and cooperates with examination appropriately.   Head and Face - Normocephalic and atraumatic, with no gross asymmetry noted.  The facial nerve is intact, with strong symmetric movements.  Voice and " Breathing - The patient was breathing comfortably without the use of accessory muscles. There was no wheezing, stridor. The patients voice was clear and strong, and had appropriate pitch and quality.  Ears - External ear normal. The ears were examined under binocular microscopy and with otoscope.  The left canal is grossly patent, left tympanic membrane has crusting in the attic, this was removed with cerumen loop and cupped forceps.  I was able to visualize the attic of the tympanic membrane, tympanic membrane is intact without effusion or retraction.  No evidence of cholesteatoma.  Right canal patent, right tympanic membrane is intact with surgical changes, there is a pinpoint posterior superior perforation with dry edges.  TMJ - some masseter muscle tenderness left with palpation  Eyes - Extraocular movements intact, sclera were not icteric or injected, conjunctiva were pink and moist.  Mouth - Examination of the oral cavity showed pink, healthy oral mucosa. Dentition in good condition. No lesions or ulcerations noted. The tongue was mobile and midline.   Throat - The walls of the oropharynx were smooth, pink, moist, symmetric, and had no lesions or ulcerations.  The tonsillar pillars and soft palate were symmetric. The uvula was midline on elevation.    Neck - Normal midline excursion of the laryngotracheal complex during swallowing.  Full range of motion on passive movement.  Palpation of the occipital, submental, submandibular, internal jugular chain, and supraclavicular nodes did not demonstrate any abnormal lymph nodes or masses.  Palpation of the thyroid was soft and smooth, with no nodules or goiter appreciated.  The trachea was mobile and midline.  Nose - External contour is symmetric, no gross deflection or scars.  Nasal mucosa is pink and moist with no abnormal mucus.  The septum and turbinates were evaluated with nasal speculum,  no polyps, masses, or purulence noted on examination.    To evaluate the  nasopharynx and eustachian tubes in the presence of left otalgia, I performed rigid nasal endoscopy.  I sprayed both nares with 2 sprays lidocaine and neosynephrine.     I began with the RIGHT side using a 0 degree rigid nasal endoscope, and then similarly examined the LEFT side    Bilateral chonae patent, nasopharynx is clear, ETs are patent bilaterally           Assessment and Plan:       ICD-10-CM    1. Mixed conductive and sensorineural hearing loss of right ear with restricted hearing of left ear  H90.A31 lidocaine 2%-oxymetazoline 0.025% nasal solution 2 spray      2. Sensorineural hearing loss (SNHL) of left ear with restricted hearing of right ear  H90.A22       3. Otalgia, left  H92.02       4. TMJ tenderness, left  M26.622       5. Pulsatile tinnitus of left ear  H93.A2         Medically cleared for hearing aids  TMJ information:  Rest the muscles of the jaw by eating soft foods, avoid chewing gum, avoid clenching or tensing the jaw  Apply moist warm heat to the affected jaw for 30 minutes at least twice daily  Physical therapy can be helpful as well  OTC (such as ibuprofen or aleve) NSAIDs per package directions  Oral splints or mouth guards as sometimes necessary  If these steps are not helpful let us know and we will pursue further imaging    TMJ exercises handout  Follow up for audiogram in 6 months    If symptoms persist, recommend CTA head and neck     Klaudia BREWERC  [facility] ENT        Again, thank you for allowing me to participate in the care of your patient.        Sincerely,        Kathryn Patel MD    Electronically signed

## 2025-05-05 NOTE — PATIENT INSTRUCTIONS
Medically cleared for hearing aids  TMJ information:  Rest the muscles of the jaw by eating soft foods, avoid chewing gum, avoid clenching or tensing the jaw  Apply moist warm heat to the affected jaw for 30 minutes at least twice daily  Physical therapy can be helpful as well  OTC (such as ibuprofen or aleve) NSAIDs per package directions  Oral splints or mouth guards as sometimes necessary  If these steps are not helpful let us know and we will pursue further imaging    TMJ exercises handout  Follow up for audiogram in 6 months        Thank you for allowing Klaudia York and our ENT team to participate in your care.  If your medications are too expensive, please give the nurse a call.  We can possibly change this medication.  If you have a scheduling or an appointment question please contact our Health Unit Coordinator at their direct line [phone] ext [phone].   ALL nursing questions or concerns can be directed to your ENT nurse at: [phone] - [place]

## 2025-05-05 NOTE — PROGRESS NOTES
Otolaryngology Note         Chief Complaint:     Patient presents with:  Ear Problem: Left ear pain           History of Present Illness:     Rakel Mckay is a 59 year old female seen today for hearing loss and tinnitus    She was last seen in ENT on 2/24/2025 by Dr. Patel for concerns for right-sided hearing loss.   On exam there was a right sided dry posterior superior 5% perforation with surgical changes consistent with prior stapedectomy and mild myringosclerosis.  The left tympanic membrane was intact without effusion or retraction.  Dr. Patel had discussed right sided fat myringoplasty    History of right ear infection     Recent onset Left sided ear pain and pain into the jaw.  She was seen in clinic and noted slight bulge left without obvious infection or effusion.    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  Comes and goes.  Worse at night  Left and right with high pitched tinnitus that comes and goes.      Left ear pain is improving  + allergy symptoms prior to onset of pain and pulsing  No history of smoking  No rotary vertigo  Family history of adult onset hearing loss  No facial numbness or tingling.     Audiogram dated 4/11/2025 shows normal thresholds sloping to a high-frequency left mild to moderate to severe mixed hearing loss large volume B tympanogram on the right AD left.  SRT bilaterally 20 dB  100% word recognition score    MRI IAC completed 1/10/24 negative          Medications:     No current outpatient medications on file.            Allergies:     Allergies: Patient has no known allergies.          Past Medical History:     Past Medical History:   Diagnosis Date    Diffuse cystic mastopathy of breast     No Comments Provided    Encounter for screening for malignant neoplasm of colon     11/11/2014    Female infertility     Secondary to  "Chlamydia and pelvic infections    Other chest pain     No Comments Provided    Other specified postprocedural states     12/8/2016    Personal history of other mental and behavioral disorders     with fatigue    Superficial foreign body of right foot     11/29/2016            Past Surgical History:     Past Surgical History:   Procedure Laterality Date    COLONOSCOPY  11/13/2014    Normal 10 year follow up    Pyloric stenosis      Operated in infancy    Removal of foreign body from foot Right 12/08/2016    Salpingostomies  1997     [place], [place]    TONSILLECTOMY      TYMPANOSTOMY, LOCAL/TOPICAL ANESTHESIA      Bilateral TM perforations and tympanostomies in childhood       ENT family history reviewed         Social History:     Social History     Tobacco Use    Smoking status: Never    Smokeless tobacco: Never   Vaping Use    Vaping status: Never Used   Substance Use Topics    Alcohol use: Not Currently     Alcohol/week: 0.0 standard drinks of alcohol     Comment: rare    Drug use: Never            Review of Systems:     ROS: See HPI         Physical Exam:     /66 (BP Location: Left arm, Patient Position: Sitting, Cuff Size: Adult Regular)   Pulse 63   Temp 97.3  F (36.3  C) (Tympanic)   Resp 16   Ht 1.702 m (5' 7.01\")   Wt 75.4 kg (166 lb 3.6 oz)   LMP  (LMP Unknown)   SpO2 98%   BMI 26.03 kg/m    General - The patient is well nourished and well developed, and appears to have good nutritional status.  Alert and oriented to person and place, answers questions and cooperates with examination appropriately.   Head and Face - Normocephalic and atraumatic, with no gross asymmetry noted.  The facial nerve is intact, with strong symmetric movements.  Voice and Breathing - The patient was breathing comfortably without the use of accessory muscles. There was no wheezing, stridor. The patients voice was clear and strong, and had appropriate pitch and quality.  Ears - External ear normal. The ears were examined " under binocular microscopy and with otoscope.  The left canal is grossly patent, left tympanic membrane has crusting in the attic, this was removed with cerumen loop and cupped forceps.  I was able to visualize the attic of the tympanic membrane, tympanic membrane is intact without effusion or retraction.  No evidence of cholesteatoma.  Right canal patent, right tympanic membrane is intact with surgical changes, there is a pinpoint posterior superior perforation with dry edges.  TMJ - some masseter muscle tenderness left with palpation  Eyes - Extraocular movements intact, sclera were not icteric or injected, conjunctiva were pink and moist.  Mouth - Examination of the oral cavity showed pink, healthy oral mucosa. Dentition in good condition. No lesions or ulcerations noted. The tongue was mobile and midline.   Throat - The walls of the oropharynx were smooth, pink, moist, symmetric, and had no lesions or ulcerations.  The tonsillar pillars and soft palate were symmetric. The uvula was midline on elevation.    Neck - Normal midline excursion of the laryngotracheal complex during swallowing.  Full range of motion on passive movement.  Palpation of the occipital, submental, submandibular, internal jugular chain, and supraclavicular nodes did not demonstrate any abnormal lymph nodes or masses.  Palpation of the thyroid was soft and smooth, with no nodules or goiter appreciated.  The trachea was mobile and midline.  Nose - External contour is symmetric, no gross deflection or scars.  Nasal mucosa is pink and moist with no abnormal mucus.  The septum and turbinates were evaluated with nasal speculum,  no polyps, masses, or purulence noted on examination.    To evaluate the nasopharynx and eustachian tubes in the presence of left otalgia, I performed rigid nasal endoscopy.  I sprayed both nares with 2 sprays lidocaine and neosynephrine.     I began with the RIGHT side using a 0 degree rigid nasal endoscope, and then  similarly examined the LEFT side    Bilateral chonae patent, nasopharynx is clear, ETs are patent bilaterally           Assessment and Plan:       ICD-10-CM    1. Mixed conductive and sensorineural hearing loss of right ear with restricted hearing of left ear  H90.A31 lidocaine 2%-oxymetazoline 0.025% nasal solution 2 spray      2. Sensorineural hearing loss (SNHL) of left ear with restricted hearing of right ear  H90.A22       3. Otalgia, left  H92.02       4. TMJ tenderness, left  M26.622       5. Pulsatile tinnitus of left ear  H93.A2         Medically cleared for hearing aids  TMJ information:  Rest the muscles of the jaw by eating soft foods, avoid chewing gum, avoid clenching or tensing the jaw  Apply moist warm heat to the affected jaw for 30 minutes at least twice daily  Physical therapy can be helpful as well  OTC (such as ibuprofen or aleve) NSAIDs per package directions  Oral splints or mouth guards as sometimes necessary  If these steps are not helpful let us know and we will pursue further imaging    TMJ exercises handout  Follow up for audiogram in 6 months    If symptoms persist, recommend CTA head and neck     Klaudia York NP-C  [facility]

## 2025-07-09 ENCOUNTER — OFFICE VISIT (OUTPATIENT)
Dept: SURGERY | Facility: OTHER | Age: 60
End: 2025-07-09
Attending: SURGERY
Payer: COMMERCIAL

## 2025-07-09 VITALS
RESPIRATION RATE: 16 BRPM | DIASTOLIC BLOOD PRESSURE: 78 MMHG | HEART RATE: 54 BPM | OXYGEN SATURATION: 100 % | WEIGHT: 165.2 LBS | SYSTOLIC BLOOD PRESSURE: 136 MMHG | BODY MASS INDEX: 25.93 KG/M2 | HEIGHT: 67 IN | TEMPERATURE: 97 F

## 2025-07-09 DIAGNOSIS — L98.9 SKIN LESION: Primary | ICD-10-CM

## 2025-07-09 ASSESSMENT — PAIN SCALES - GENERAL: PAINLEVEL_OUTOF10: NO PAIN (0)

## 2025-07-09 NOTE — PROGRESS NOTES
Procedure Note  Pre/Post Operative Diagnosis:   0.8 cm ( with margins) skin lesion of right thigh    Procedure:    Excision    Surgeon: Mat Raines MD FACS    Local Anesthesia: 1% lidocaine with0.25%Marcaine with epinephrine    Indication for the procedure:    This is a 60 year old female patient  with enlarging, sore skin lesion on right thigh. Looks like an hemangioma.    After explaining the risks to include bleeding, infection, recurrence or need for re-excision,and scarring the patient wished to proceed.    Procedure:   The area was prepped and draped in usual sterile fashion with ChloraPrep . After, adequate local anesthesia,an elliptical skin incision was made to encompass the lesion. Dermis approximated with a 3-0 Vicryl suture.  The skin was closed with 4-0 Monocryl.  Glue and a clean dry dressing was applied.    Plan:   Patient will followup if there any problems with the wound including redness or drainage.

## 2025-07-09 NOTE — NURSING NOTE
"Chief Complaint   Patient presents with    Procedure     Skin lesion   right upper thigh  hurts at times       Medication reconciliation completed.    FOOD SECURITY SCREENING QUESTIONS:    The next two questions are to help us understand your food security.  If you are feeling you need any assistance in this area, we have resources available to support you today.    Hunger Vital Signs:  Within the past 12 months we worried whether our food would run out before we got money to buy more. Never  Within the past 12 months the food we bought just didn't last and we didn't have money to get more. Never    Initial /78 (BP Location: Left arm, Patient Position: Sitting, Cuff Size: Adult Regular)   Pulse 54   Temp 97  F (36.1  C) (Temporal)   Resp 16   Ht 1.702 m (5' 7\")   Wt 74.9 kg (165 lb 3.2 oz)   LMP  (LMP Unknown)   SpO2 100%   BMI 25.87 kg/m   Estimated body mass index is 25.87 kg/m  as calculated from the following:    Height as of this encounter: 1.702 m (5' 7\").    Weight as of this encounter: 74.9 kg (165 lb 3.2 oz).       Christelle Cueto LPN .......  7/9/2025  7:49 AM    "

## 2025-07-09 NOTE — PATIENT INSTRUCTIONS
Instructions:    Your incision was closed with stitches that will dissolve.   A surgical glue was used to help keep the incision closed. Do not apply any Vaseline, triple antibiotics, bacitracin, or any product on the glue as this may soften and create it to be sticky which may cause skin irritation.  It is ok to remove the dressing and get the incision wet in the shower on the day after your procedure. Pat dry the area. Do not rub. Don't soak in a tub, pool or lake for 7 days.   Pathology can take up to 7-10 days to be completed. Once pathology is reviewed by the provider we will give you a call, letter, or MyChart message with the results. You may see the results prior to the provider reviewing them through your MyChart.  .       Monitor for any signs of infection:     Redness in the area of the wound, particularly if it spreads or forms a red streak  Swelling or warmth in the affected area  Pain or tenderness at or around the site of the wound  Pus forming around or oozing from the wound  Fever  Delayed wound healing    Please call the General Surgery office and ask for a nurse in unit 4S with problems, questions, or concerns at [phone].    General Surgery Nurses  Courtney Wick LPN

## 2025-07-09 NOTE — PROGRESS NOTES
TIMEOUT    Universal Protocol    A. Pre-procedure verification complete   1-relevant information / documentation available, reviewed and properly matched to the patient; 2-consent accurate and complete, 3-equipment and supplies available    B. Site marking complete   Site marked if not in continuous attendance with patient    C. TIME OUT completed   Time Out was conducted just prior to starting procedure to verify the eight required elements: 1-patient identity, 2-consent accurate and complete, 3-position, 4-correct side/site marked (if applicable), 5-procedure, 6-relevant images / results properly labeled and displayed (if applicable), 7-antibiotics / irrigation fluids (if applicable), 8-safety precautions.    Surgical Nurse  ....................  7/9/2025   8:01 AM

## 2025-07-14 ENCOUNTER — MYC MEDICAL ADVICE (OUTPATIENT)
Dept: FAMILY MEDICINE | Facility: OTHER | Age: 60
End: 2025-07-14
Payer: COMMERCIAL

## 2025-07-14 DIAGNOSIS — G44.209 TENSION HEADACHE: ICD-10-CM

## 2025-07-14 LAB
PATH REPORT.COMMENTS IMP SPEC: NORMAL
PATH REPORT.FINAL DX SPEC: NORMAL
PHOTO IMAGE: NORMAL

## 2025-07-15 RX ORDER — CYCLOBENZAPRINE HCL 10 MG
10 TABLET ORAL
Qty: 42 TABLET | Refills: 3 | Status: SHIPPED | OUTPATIENT
Start: 2025-07-15

## 2025-07-15 NOTE — TELEPHONE ENCOUNTER
Wondering if she can get Flexeril again. The tizanidine did not help her.     Due for Annual    LOV 4/8/25    Flexeril last ordered 112/5/18-3/3/20  Tizanidine last ordered 10/31/24-4/8/25    Raghavendra'd up same order if willing.   Recommend schedule annual appt and discuss there    Routing to provider to review and respond.  Yonis Bolanos RN on 7/15/2025 at 9:42 AM

## 2025-07-21 ENCOUNTER — HOSPITAL ENCOUNTER (OUTPATIENT)
Dept: GENERAL RADIOLOGY | Facility: OTHER | Age: 60
Discharge: HOME | End: 2025-07-21
Attending: STUDENT IN AN ORGANIZED HEALTH CARE EDUCATION/TRAINING PROGRAM
Payer: COMMERCIAL

## 2025-07-21 ENCOUNTER — OFFICE VISIT (OUTPATIENT)
Dept: FAMILY MEDICINE | Facility: OTHER | Age: 60
End: 2025-07-21
Attending: STUDENT IN AN ORGANIZED HEALTH CARE EDUCATION/TRAINING PROGRAM
Payer: COMMERCIAL

## 2025-07-21 VITALS
RESPIRATION RATE: 18 BRPM | OXYGEN SATURATION: 99 % | HEART RATE: 66 BPM | BODY MASS INDEX: 25.8 KG/M2 | HEIGHT: 67 IN | SYSTOLIC BLOOD PRESSURE: 120 MMHG | DIASTOLIC BLOOD PRESSURE: 75 MMHG | TEMPERATURE: 98.6 F | WEIGHT: 164.4 LBS

## 2025-07-21 DIAGNOSIS — R07.81 RIB PAIN: ICD-10-CM

## 2025-07-21 DIAGNOSIS — R07.81 RIB PAIN: Primary | ICD-10-CM

## 2025-07-21 PROCEDURE — 71101 X-RAY EXAM UNILAT RIBS/CHEST: CPT | Mod: LT

## 2025-07-21 PROCEDURE — 71101 X-RAY EXAM UNILAT RIBS/CHEST: CPT | Mod: 26 | Performed by: RADIOLOGY

## 2025-07-21 ASSESSMENT — ANXIETY QUESTIONNAIRES
8. IF YOU CHECKED OFF ANY PROBLEMS, HOW DIFFICULT HAVE THESE MADE IT FOR YOU TO DO YOUR WORK, TAKE CARE OF THINGS AT HOME, OR GET ALONG WITH OTHER PEOPLE?: NOT DIFFICULT AT ALL
1. FEELING NERVOUS, ANXIOUS, OR ON EDGE: NOT AT ALL
6. BECOMING EASILY ANNOYED OR IRRITABLE: NOT AT ALL
GAD7 TOTAL SCORE: 0
2. NOT BEING ABLE TO STOP OR CONTROL WORRYING: NOT AT ALL
7. FEELING AFRAID AS IF SOMETHING AWFUL MIGHT HAPPEN: NOT AT ALL
IF YOU CHECKED OFF ANY PROBLEMS ON THIS QUESTIONNAIRE, HOW DIFFICULT HAVE THESE PROBLEMS MADE IT FOR YOU TO DO YOUR WORK, TAKE CARE OF THINGS AT HOME, OR GET ALONG WITH OTHER PEOPLE: NOT DIFFICULT AT ALL
7. FEELING AFRAID AS IF SOMETHING AWFUL MIGHT HAPPEN: NOT AT ALL
3. WORRYING TOO MUCH ABOUT DIFFERENT THINGS: NOT AT ALL
5. BEING SO RESTLESS THAT IT IS HARD TO SIT STILL: NOT AT ALL
GAD7 TOTAL SCORE: 0
4. TROUBLE RELAXING: NOT AT ALL
GAD7 TOTAL SCORE: 0

## 2025-07-21 ASSESSMENT — PATIENT HEALTH QUESTIONNAIRE - PHQ9
10. IF YOU CHECKED OFF ANY PROBLEMS, HOW DIFFICULT HAVE THESE PROBLEMS MADE IT FOR YOU TO DO YOUR WORK, TAKE CARE OF THINGS AT HOME, OR GET ALONG WITH OTHER PEOPLE: NOT DIFFICULT AT ALL
SUM OF ALL RESPONSES TO PHQ QUESTIONS 1-9: 0
SUM OF ALL RESPONSES TO PHQ QUESTIONS 1-9: 0

## 2025-07-21 ASSESSMENT — PAIN SCALES - GENERAL: PAINLEVEL_OUTOF10: MILD PAIN (2)

## 2025-07-21 NOTE — NURSING NOTE
"Chief Complaint   Patient presents with    Sore Ribs   Patient presents to the clinic today for soreness on the left side. Ribs.     Initial /75 (BP Location: Right arm, Patient Position: Sitting, Cuff Size: Adult Regular)   Pulse 66   Temp 98.6  F (37  C) (Tympanic)   Resp 18   Ht 1.702 m (5' 7\")   Wt 74.6 kg (164 lb 6.4 oz)   LMP  (LMP Unknown)   SpO2 99%   BMI 25.75 kg/m   Estimated body mass index is 25.75 kg/m  as calculated from the following:    Height as of this encounter: 1.702 m (5' 7\").    Weight as of this encounter: 74.6 kg (164 lb 6.4 oz).  Medication Review: complete    The next two questions are to help us understand your food security.  If you are feeling you need any assistance in this area, we have resources available to support you today.          7/21/2025   SDOH- Food Insecurity   Within the past 12 months, did you worry that your food would run out before you got money to buy more? N   Within the past 12 months, did the food you bought just not last and you didn t have money to get more? N         Health Care Directive:  Patient does not have a Health Care Directive: Discussed advance care planning with patient; however, patient declined at this time.    Ramirez Moore      "

## 2025-07-21 NOTE — PROGRESS NOTES
"  Assessment & Plan   Problem List Items Addressed This Visit    None  Visit Diagnoses         Rib pain    -  Primary          Cxr negative for acute pathology. Likely intercostal muscle strain. Recommend supportive cares at home with OTC analgesics, heat or ice. Return if new or worsening symptoms       BMI  Estimated body mass index is 25.75 kg/m  as calculated from the following:    Height as of this encounter: 1.702 m (5' 7\").    Weight as of this encounter: 74.6 kg (164 lb 6.4 oz).           Subjective   Rakel is a 60 year old, presenting for the following health issues:  Sore Ribs      7/21/2025     1:57 PM   Additional Questions   Roomed by Ramirez BERNAL     HPI      Sore ribs on the left lower side  No known trauma  Noticed it after her daughter gave her a piggy back ride  Is going on a motorcycle trip so wanted to be sure it was ok                 Review of Systems  Constitutional, HEENT, cardiovascular, pulmonary, gi and gu systems are negative, except as otherwise noted.      Objective    /75 (BP Location: Right arm, Patient Position: Sitting, Cuff Size: Adult Regular)   Pulse 66   Temp 98.6  F (37  C) (Tympanic)   Resp 18   Ht 1.702 m (5' 7\")   Wt 74.6 kg (164 lb 6.4 oz)   LMP  (LMP Unknown)   SpO2 99%   BMI 25.75 kg/m    Body mass index is 25.75 kg/m .  Physical Exam   GENERAL: alert and no distress  RESP: lungs clear to auscultation - no rales, rhonchi or wheezes  CV: regular rate and rhythm, normal S1 S2, no S3 or S4, no murmur, click or rub, no peripheral edema   MS: left lower ribs tender to palpation between about 7-8th rib    XR Ribs & Chest Left G/E 3 Views  Result Date: 7/21/2025  PROCEDURE:  XR RIBS AND CHEST LEFT 3 VIEWS HISTORY: left lower rib pain, acute; Rib pain COMPARISON: Chest radiographs 4/26/2024, 11/6/2019 FINDINGS: PA chest and 3 view left rib radiographs Cardiomediastinal silhouette is within normal limits. No focal consolidation, effusion or pneumothorax.  No " suspicious osseous lesion. No acute osseous abnormality. Ribs appear within normal limits.     IMPRESSION:  No acute cardiopulmonary process. No acute osseous abnormality. PHYLLIS COLORADO MD   SYSTEM ID:  [id number]        Signed Electronically by: Shaneka Cortez MD

## 2025-08-22 ENCOUNTER — HOSPITAL ENCOUNTER (OUTPATIENT)
Dept: MAMMOGRAPHY | Facility: OTHER | Age: 60
Discharge: HOME | End: 2025-08-22
Attending: PHYSICIAN ASSISTANT | Admitting: PHYSICIAN ASSISTANT
Payer: COMMERCIAL

## 2025-08-22 DIAGNOSIS — Z12.31 VISIT FOR SCREENING MAMMOGRAM: ICD-10-CM

## 2025-08-22 PROCEDURE — 77067 SCR MAMMO BI INCL CAD: CPT | Mod: 26 | Performed by: STUDENT IN AN ORGANIZED HEALTH CARE EDUCATION/TRAINING PROGRAM

## 2025-08-22 PROCEDURE — 77063 BREAST TOMOSYNTHESIS BI: CPT | Mod: 26 | Performed by: STUDENT IN AN ORGANIZED HEALTH CARE EDUCATION/TRAINING PROGRAM

## 2025-08-22 PROCEDURE — 77067 SCR MAMMO BI INCL CAD: CPT

## 2025-09-04 ENCOUNTER — HOSPITAL ENCOUNTER (OUTPATIENT)
Dept: ULTRASOUND IMAGING | Facility: OTHER | Age: 60
End: 2025-09-04
Attending: PHYSICIAN ASSISTANT
Payer: COMMERCIAL

## 2025-09-04 DIAGNOSIS — R92.8 ABNORMAL FINDING ON BREAST IMAGING: ICD-10-CM

## 2025-09-04 PROCEDURE — 76642 ULTRASOUND BREAST LIMITED: CPT | Mod: RT

## (undated) RX ORDER — LIDOCAINE HYDROCHLORIDE AND EPINEPHRINE 10; 10 MG/ML; UG/ML
INJECTION, SOLUTION INFILTRATION; PERINEURAL
Status: DISPENSED
Start: 2018-02-14

## (undated) RX ORDER — ASPIRIN 81 MG/1
TABLET, CHEWABLE ORAL
Status: DISPENSED
Start: 2024-04-26

## (undated) RX ORDER — LIDOCAINE HYDROCHLORIDE 10 MG/ML
INJECTION, SOLUTION INFILTRATION; PERINEURAL
Status: DISPENSED
Start: 2018-02-14